# Patient Record
Sex: FEMALE | Race: BLACK OR AFRICAN AMERICAN | NOT HISPANIC OR LATINO | Employment: FULL TIME | URBAN - METROPOLITAN AREA
[De-identification: names, ages, dates, MRNs, and addresses within clinical notes are randomized per-mention and may not be internally consistent; named-entity substitution may affect disease eponyms.]

---

## 2017-01-16 ENCOUNTER — LAB (OUTPATIENT)
Dept: INTERNAL MEDICINE | Facility: CLINIC | Age: 42
End: 2017-01-16

## 2017-01-16 DIAGNOSIS — E55.9 VITAMIN D DEFICIENCY: ICD-10-CM

## 2017-01-16 DIAGNOSIS — I10 BENIGN ESSENTIAL HYPERTENSION: ICD-10-CM

## 2017-01-16 DIAGNOSIS — J30.9 ALLERGIC RHINITIS, UNSPECIFIED ALLERGIC RHINITIS TRIGGER, UNSPECIFIED RHINITIS SEASONALITY: ICD-10-CM

## 2017-01-16 DIAGNOSIS — K21.9 GASTROESOPHAGEAL REFLUX DISEASE, ESOPHAGITIS PRESENCE NOT SPECIFIED: ICD-10-CM

## 2017-01-16 DIAGNOSIS — J30.9 ALLERGIC RHINITIS: ICD-10-CM

## 2017-01-16 LAB
25(OH)D3 SERPL-MCNC: 35.6 NG/ML
ALBUMIN SERPL-MCNC: 4 G/DL (ref 3.2–4.8)
ALBUMIN/GLOB SERPL: 1.1 G/DL (ref 1.5–2.5)
ALP SERPL-CCNC: 56 U/L (ref 25–100)
ALT SERPL W P-5'-P-CCNC: 5 U/L (ref 7–40)
ANION GAP SERPL CALCULATED.3IONS-SCNC: 12 MMOL/L (ref 3–11)
ARTICHOKE IGE QN: 112 MG/DL (ref 0–130)
AST SERPL-CCNC: 13 U/L (ref 0–33)
BASOPHILS # BLD AUTO: 0.04 10*3/MM3 (ref 0–0.2)
BASOPHILS NFR BLD AUTO: 0.4 % (ref 0–1)
BILIRUB SERPL-MCNC: 0.4 MG/DL (ref 0.3–1.2)
BUN BLD-MCNC: 13 MG/DL (ref 9–23)
BUN/CREAT SERPL: 16.3 (ref 7–25)
CALCIUM SPEC-SCNC: 10.1 MG/DL (ref 8.7–10.4)
CHLORIDE SERPL-SCNC: 104 MMOL/L (ref 99–109)
CHOLEST SERPL-MCNC: 258 MG/DL (ref 0–200)
CO2 SERPL-SCNC: 29 MMOL/L (ref 20–31)
CREAT BLD-MCNC: 0.8 MG/DL (ref 0.6–1.3)
DEPRECATED RDW RBC AUTO: 45 FL (ref 37–54)
EOSINOPHIL # BLD AUTO: 0.16 10*3/MM3 (ref 0.1–0.3)
EOSINOPHIL NFR BLD AUTO: 1.7 % (ref 0–3)
ERYTHROCYTE [DISTWIDTH] IN BLOOD BY AUTOMATED COUNT: 14.4 % (ref 11.3–14.5)
GFR SERPL CREATININE-BSD FRML MDRD: 97 ML/MIN/1.73
GLOBULIN UR ELPH-MCNC: 3.5 GM/DL
GLUCOSE BLD-MCNC: 94 MG/DL (ref 70–100)
HCT VFR BLD AUTO: 36.5 % (ref 34.5–44)
HDLC SERPL-MCNC: 90 MG/DL (ref 40–60)
HGB BLD-MCNC: 11.4 G/DL (ref 11.5–15.5)
IMM GRANULOCYTES # BLD: 0.03 10*3/MM3 (ref 0–0.03)
IMM GRANULOCYTES NFR BLD: 0.3 % (ref 0–0.6)
LYMPHOCYTES # BLD AUTO: 3.47 10*3/MM3 (ref 0.6–4.8)
LYMPHOCYTES NFR BLD AUTO: 36.1 % (ref 24–44)
MCH RBC QN AUTO: 27.2 PG (ref 27–31)
MCHC RBC AUTO-ENTMCNC: 31.2 G/DL (ref 32–36)
MCV RBC AUTO: 87.1 FL (ref 80–99)
MONOCYTES # BLD AUTO: 0.5 10*3/MM3 (ref 0–1)
MONOCYTES NFR BLD AUTO: 5.2 % (ref 0–12)
NEUTROPHILS # BLD AUTO: 5.41 10*3/MM3 (ref 1.5–8.3)
NEUTROPHILS NFR BLD AUTO: 56.3 % (ref 41–71)
PLATELET # BLD AUTO: 385 10*3/MM3 (ref 150–450)
PMV BLD AUTO: 10.1 FL (ref 6–12)
POTASSIUM BLD-SCNC: 4.1 MMOL/L (ref 3.5–5.5)
PROT SERPL-MCNC: 7.5 G/DL (ref 5.7–8.2)
RBC # BLD AUTO: 4.19 10*6/MM3 (ref 3.89–5.14)
SODIUM BLD-SCNC: 145 MMOL/L (ref 132–146)
TRIGL SERPL-MCNC: 219 MG/DL (ref 0–150)
TSH SERPL DL<=0.05 MIU/L-ACNC: 2.57 MIU/ML (ref 0.35–5.35)
VIT B12 BLD-MCNC: 300 PG/ML (ref 211–911)
WBC NRBC COR # BLD: 9.61 10*3/MM3 (ref 3.5–10.8)

## 2017-01-16 PROCEDURE — 85025 COMPLETE CBC W/AUTO DIFF WBC: CPT | Performed by: NURSE PRACTITIONER

## 2017-01-16 PROCEDURE — 82306 VITAMIN D 25 HYDROXY: CPT | Performed by: NURSE PRACTITIONER

## 2017-01-16 PROCEDURE — 82607 VITAMIN B-12: CPT | Performed by: NURSE PRACTITIONER

## 2017-01-16 PROCEDURE — 84443 ASSAY THYROID STIM HORMONE: CPT | Performed by: NURSE PRACTITIONER

## 2017-01-16 PROCEDURE — 80061 LIPID PANEL: CPT | Performed by: NURSE PRACTITIONER

## 2017-01-16 PROCEDURE — 80053 COMPREHEN METABOLIC PANEL: CPT | Performed by: NURSE PRACTITIONER

## 2017-01-16 RX ORDER — MONTELUKAST SODIUM 10 MG/1
TABLET ORAL
Qty: 30 TABLET | Refills: 5 | Status: SHIPPED | OUTPATIENT
Start: 2017-01-16 | End: 2017-06-12 | Stop reason: SDUPTHER

## 2017-01-16 RX ORDER — OMEPRAZOLE 40 MG/1
CAPSULE, DELAYED RELEASE ORAL
Qty: 60 CAPSULE | Refills: 3 | Status: SHIPPED | OUTPATIENT
Start: 2017-01-16 | End: 2017-05-18 | Stop reason: SDUPTHER

## 2017-01-25 ENCOUNTER — TELEPHONE (OUTPATIENT)
Dept: INTERNAL MEDICINE | Facility: CLINIC | Age: 42
End: 2017-01-25

## 2017-01-25 NOTE — TELEPHONE ENCOUNTER
----- Message from DAVIN Jo sent at 1/24/2017 10:35 AM EST -----  Please let pt know that her TG's are too high.  This is directly related to sugar/carb and fats.  I really want her to watch her diet.  Her total cholesterol is over 200, but this is fine because her LDL is now and her HDL is high.  Everything else looks good.

## 2017-01-27 NOTE — TELEPHONE ENCOUNTER
Patient has not returned my call, so I have sent her a My Chart msg directly letting her know results and to call me if she has any questions.

## 2017-02-16 RX ORDER — HYDROCHLOROTHIAZIDE 12.5 MG/1
TABLET ORAL
Qty: 30 TABLET | Refills: 3 | Status: SHIPPED | OUTPATIENT
Start: 2017-02-16 | End: 2017-06-12 | Stop reason: SDUPTHER

## 2017-03-02 ENCOUNTER — OFFICE VISIT (OUTPATIENT)
Dept: INTERNAL MEDICINE | Facility: CLINIC | Age: 42
End: 2017-03-02

## 2017-03-02 VITALS
WEIGHT: 227.8 LBS | HEIGHT: 61 IN | TEMPERATURE: 97.9 F | SYSTOLIC BLOOD PRESSURE: 112 MMHG | DIASTOLIC BLOOD PRESSURE: 86 MMHG | BODY MASS INDEX: 43.01 KG/M2

## 2017-03-02 DIAGNOSIS — R10.2 PELVIC PAIN: ICD-10-CM

## 2017-03-02 DIAGNOSIS — R10.32 LEFT LOWER QUADRANT PAIN: Primary | ICD-10-CM

## 2017-03-02 DIAGNOSIS — N39.0 URINARY TRACT INFECTION WITHOUT HEMATURIA, SITE UNSPECIFIED: ICD-10-CM

## 2017-03-02 LAB
B-HCG UR QL: NEGATIVE
BILIRUB BLD-MCNC: ABNORMAL MG/DL
CLARITY, POC: ABNORMAL
COLOR UR: ABNORMAL
GLUCOSE UR STRIP-MCNC: NEGATIVE MG/DL
INTERNAL NEGATIVE CONTROL: NEGATIVE
INTERNAL POSITIVE CONTROL: POSITIVE
KETONES UR QL: ABNORMAL
LEUKOCYTE EST, POC: ABNORMAL
Lab: NORMAL
NITRITE UR-MCNC: NEGATIVE MG/ML
PH UR: 6 [PH] (ref 5–8)
PROT UR STRIP-MCNC: ABNORMAL MG/DL
RBC # UR STRIP: NEGATIVE /UL
SP GR UR: 1.03 (ref 1–1.03)
UROBILINOGEN UR QL: ABNORMAL

## 2017-03-02 PROCEDURE — 87086 URINE CULTURE/COLONY COUNT: CPT | Performed by: NURSE PRACTITIONER

## 2017-03-02 PROCEDURE — 81003 URINALYSIS AUTO W/O SCOPE: CPT | Performed by: NURSE PRACTITIONER

## 2017-03-02 PROCEDURE — 99214 OFFICE O/P EST MOD 30 MIN: CPT | Performed by: NURSE PRACTITIONER

## 2017-03-02 PROCEDURE — 81025 URINE PREGNANCY TEST: CPT | Performed by: NURSE PRACTITIONER

## 2017-03-02 RX ORDER — CIPROFLOXACIN 500 MG/1
500 TABLET, FILM COATED ORAL 2 TIMES DAILY
Qty: 20 TABLET | Refills: 0 | Status: SHIPPED | OUTPATIENT
Start: 2017-03-02 | End: 2017-03-12

## 2017-03-02 NOTE — PROGRESS NOTES
Subjective   Mariusz Yepez is a 41 y.o. female    Chief Complaint   Patient presents with   • Abdominal Pain     Lower left sided pain going on for about 1 month. Aching pain all the time. Has to hold and put pressure on her left lower side with she does get up and walk around.      Abdominal Pain   This is a new problem. The current episode started more than 1 month ago. The onset quality is gradual. The problem occurs intermittently (was waking her up at night). The most recent episode lasted 1 month. The problem has been gradually improving. The pain is located in the LLQ. The pain is mild. The quality of the pain is aching and colicky. The abdominal pain radiates to the LLQ. Associated symptoms include belching, constipation (chronic) and flatus. Pertinent negatives include no anorexia, arthralgias, diarrhea, dysuria, fever, frequency, headaches, hematochezia, hematuria, melena, myalgias, nausea, vomiting or weight loss. The pain is aggravated by movement and certain positions (walking down stairs, standing for long periods of time, lying on her stomach at night). The pain is relieved by nothing. Treatments tried: nsaids, increased fluids. The treatment provided no relief. Her past medical history is significant for abdominal surgery (umbilical hernia repair at age 7) and GERD. There is no history of colon cancer, Crohn's disease, gallstones, irritable bowel syndrome, pancreatitis, PUD or ulcerative colitis.      Pain started after her January 2017 menses. LMP 2/18/2017,  Regular cycle, flow light, not sexually active. Has a history of PCOS and is on metformin.  Pain started before she started taking the metformin. Denies any urinary symptoms.     The following portions of the patient's history were reviewed and updated as appropriate: allergies, current medications, past family history, past medical history, past social history, past surgical history and problem list.    Current Outpatient Prescriptions:    •  albuterol (PROVENTIL HFA;VENTOLIN HFA) 108 (90 BASE) MCG/ACT inhaler, Inhale 2 puffs every 4 (four) hours as needed for wheezing., Disp: 1 inhaler, Rfl: 0  •  azelastine (ASTELIN) 0.1 % nasal spray, 2 sprays into each nostril 2 (two) times a day. Use in each nostril as directed, Disp: 1 each, Rfl: 12  •  fexofenadine (ALLEGRA ALLERGY) 180 MG tablet, Take 1 tablet by mouth daily., Disp: 30 tablet, Rfl: 5  •  hydrochlorothiazide (HYDRODIURIL) 12.5 MG tablet, take 1 tablet by mouth once daily, Disp: 30 tablet, Rfl: 3  •  metFORMIN (GLUCOPHAGE) 500 MG tablet, Take 1 tablet by mouth 2 (Two) Times a Day With Meals., Disp: 60 tablet, Rfl: 2  •  montelukast (SINGULAIR) 10 MG tablet, take 1 tablet by mouth every evening, Disp: 30 tablet, Rfl: 5  •  nebivolol (BYSTOLIC) 10 MG tablet, Take 10 mg by mouth Daily., Disp: , Rfl:   •  omeprazole (priLOSEC) 40 MG capsule, take 1 capsule by mouth twice a day, Disp: 60 capsule, Rfl: 3  •  SAFYRAL 3-0.03-0.451 MG tablet, , Disp: , Rfl: 0     Review of Systems   Constitutional: Negative for chills, fatigue, fever and weight loss.   Respiratory: Negative for cough, chest tightness and shortness of breath.    Cardiovascular: Negative for chest pain.   Gastrointestinal: Positive for abdominal pain, constipation (chronic) and flatus. Negative for abdominal distention, anorexia, diarrhea, hematochezia, melena, nausea and vomiting.   Endocrine: Negative for cold intolerance and heat intolerance.   Genitourinary: Negative for decreased urine volume, difficulty urinating, dyspareunia (not sexually active), dysuria, flank pain, frequency, hematuria, menstrual problem, pelvic pain, urgency, vaginal bleeding, vaginal discharge and vaginal pain.   Musculoskeletal: Negative for arthralgias and myalgias.   Neurological: Negative for dizziness and headaches.     Objective   Physical Exam   Constitutional: She is oriented to person, place, and time. She appears well-developed and well-nourished.  "  HENT:   Head: Normocephalic and atraumatic.   Eyes: Conjunctivae and EOM are normal. Pupils are equal, round, and reactive to light.   Neck: Normal range of motion.   Cardiovascular: Normal rate, regular rhythm and normal heart sounds.    Pulmonary/Chest: Effort normal and breath sounds normal.   Abdominal: Soft. Bowel sounds are normal. She exhibits distension. There is no hepatosplenomegaly. There is tenderness in the left lower quadrant. There is guarding (llq). There is no rigidity, no rebound and no CVA tenderness. No hernia.   Musculoskeletal: Normal range of motion.   Neurological: She is alert and oriented to person, place, and time. She has normal reflexes.   Skin: Skin is warm and dry.   Psychiatric: She has a normal mood and affect. Her behavior is normal. Judgment and thought content normal.     Vitals:    03/02/17 1521   BP: 112/86   Temp: 97.9 °F (36.6 °C)   TempSrc: Temporal Artery    Weight: 227 lb 12.8 oz (103 kg)   Height: 61\" (154.9 cm)     Assessment/Plan   Mariusz was seen today for abdominal pain.    Diagnoses and all orders for this visit:    Left lower quadrant pain  -     POC Urinalysis Dipstick, Automated  -     POCT pregnancy, urine    Urinary tract infection without hematuria, site unspecified  -     ciprofloxacin (CIPRO) 500 MG tablet; Take 1 tablet by mouth 2 (Two) Times a Day for 10 days.  -     Urine Culture    Pelvic pain  -     US non-ob transvaginal    Urine + today  Will send urine for culture  Cipro as directed   Will schedule for TV US due to PCOS history.   If US normal and pain persists after UTI resolved,  may need to proceed with CT abd/pelvis  RTC PRN or with worsening of symptoms     Scribed for DAVIN Pardo by DAVIN Warren Student. 3/2/2017  3:35 PM    IKym APRN, personally performed the services described in this documentation as scribed by the above named individual in my presence, and it is both accurate and complete.  " 3/6/2017  8:50 AM    DAVIN Jackson

## 2017-03-03 RX ORDER — NEBIVOLOL HYDROCHLORIDE 10 MG/1
10 TABLET ORAL DAILY
Qty: 30 TABLET | Refills: 3 | Status: SHIPPED | OUTPATIENT
Start: 2017-03-03 | End: 2017-10-23 | Stop reason: SDUPTHER

## 2017-03-04 LAB — BACTERIA SPEC AEROBE CULT: NORMAL

## 2017-03-07 ENCOUNTER — HOSPITAL ENCOUNTER (OUTPATIENT)
Dept: ULTRASOUND IMAGING | Facility: HOSPITAL | Age: 42
Discharge: HOME | End: 2017-03-07
Admitting: NURSE PRACTITIONER

## 2017-03-07 DIAGNOSIS — R10.2 PELVIC PAIN: ICD-10-CM

## 2017-03-07 PROCEDURE — 76857 US EXAM PELVIC LIMITED: CPT

## 2017-03-07 PROCEDURE — 76830 TRANSVAGINAL US NON-OB: CPT

## 2017-03-08 ENCOUNTER — TELEPHONE (OUTPATIENT)
Dept: INTERNAL MEDICINE | Facility: CLINIC | Age: 42
End: 2017-03-08

## 2017-03-08 NOTE — TELEPHONE ENCOUNTER
She is not having any symptoms and wanted to know if it was okay to stop the antibiotic.  I let her know that would be okay.

## 2017-03-10 ENCOUNTER — TELEPHONE (OUTPATIENT)
Dept: INTERNAL MEDICINE | Facility: CLINIC | Age: 42
End: 2017-03-10

## 2017-03-10 NOTE — TELEPHONE ENCOUNTER
PN of the ultrasound.  She seed Dr. Roula Barraza and will make an appointment.  She will call when that appointment is made so we can fax over her results.

## 2017-03-10 NOTE — TELEPHONE ENCOUNTER
There is a large uterine fibroid (4cm), but it is otherwise normal.  I would like for her to see GYN for evaluation.  Does she have a preference as to who I refer her to?

## 2017-03-10 NOTE — TELEPHONE ENCOUNTER
----- Message from Tamra Martin sent at 3/9/2017  9:17 AM EST -----  Pt called does she need to discontinue antibiotic. Please give her a call

## 2017-03-23 DIAGNOSIS — R10.30 LOWER ABDOMINAL PAIN: Primary | ICD-10-CM

## 2017-04-18 ENCOUNTER — OFFICE VISIT (OUTPATIENT)
Dept: INTERNAL MEDICINE | Facility: CLINIC | Age: 42
End: 2017-04-18

## 2017-04-18 VITALS
WEIGHT: 217 LBS | DIASTOLIC BLOOD PRESSURE: 70 MMHG | BODY MASS INDEX: 40.97 KG/M2 | HEIGHT: 61 IN | TEMPERATURE: 97.8 F | SYSTOLIC BLOOD PRESSURE: 118 MMHG

## 2017-04-18 DIAGNOSIS — K59.00 CONSTIPATION, UNSPECIFIED CONSTIPATION TYPE: ICD-10-CM

## 2017-04-18 DIAGNOSIS — R10.32 LEFT LOWER QUADRANT PAIN: Primary | ICD-10-CM

## 2017-04-18 PROCEDURE — 99214 OFFICE O/P EST MOD 30 MIN: CPT | Performed by: NURSE PRACTITIONER

## 2017-04-18 NOTE — PROGRESS NOTES
Subjective   Mariusz Yepez is a 41 y.o. female    Chief Complaint   Patient presents with   • Follow-up   • Abdominal Pain     Had Colon yesterday. 2 polyps removed. Also saw GYN. They could not tell much from the images and need her to come back for another TV US, which is scheduled for 6/8/17.  Still has the pain, tenderness and pressure in the lower abdomen.      History of Present Illness     Here for f/u on Abdominal pain    Had a colonoscopy yesterday.  They did find and remove 2 polyps.  She does not have the results yet.      Has also seen GYN (dr. Barraza).  I sent her for a TV US after a last visit and a large fibroid was found.  Dr. Barraza could not tell anything from her US, so she will repeat her with annual pap on 6/8/17.  She really thought that her sx's were GI related and told her to proceed with a colon.  She also had her hold her metformin to see if this was the cause, but she has and her abd pain has not changes.      She is still having abdominal pain.  The onset quality is gradual. The problem occurs intermittently (was waking her up at night). The most recent episode lasted 1 month. The problem has been gradually improving. The pain is located in the LLQ. The pain is mild. The quality of the pain is aching and colicky. The abdominal pain radiates to the LLQ. Associated symptoms include belching, constipation (chronic) and flatus. Pertinent negatives include no anorexia, arthralgias, diarrhea, dysuria, fever, frequency, headaches, hematochezia, hematuria, melena, myalgias, nausea, vomiting or weight loss. The pain is aggravated by movement and certain positions (walking down stairs, standing for long periods of time, lying on her stomach at night). The pain is relieved by nothing. Treatments tried: nsaids, increased fluids. The treatment provided no relief. Her past medical history is significant for abdominal surgery (umbilical hernia repair at age 7) and GERD. There is no history of colon  cancer, Crohn's disease, gallstones, irritable bowel syndrome, pancreatitis, PUD or ulcerative colitis.     The following portions of the patient's history were reviewed and updated as appropriate: allergies, current medications, past family history, past medical history, past social history, past surgical history and problem list.    Current Outpatient Prescriptions:   •  albuterol (PROVENTIL HFA;VENTOLIN HFA) 108 (90 BASE) MCG/ACT inhaler, Inhale 2 puffs every 4 (four) hours as needed for wheezing., Disp: 1 inhaler, Rfl: 0  •  azelastine (ASTELIN) 0.1 % nasal spray, 2 sprays into each nostril 2 (two) times a day. Use in each nostril as directed, Disp: 1 each, Rfl: 12  •  BYSTOLIC 10 MG tablet, Take 1 tablet by mouth Daily., Disp: 30 tablet, Rfl: 3  •  fexofenadine (ALLEGRA ALLERGY) 180 MG tablet, Take 1 tablet by mouth daily., Disp: 30 tablet, Rfl: 5  •  hydrochlorothiazide (HYDRODIURIL) 12.5 MG tablet, take 1 tablet by mouth once daily, Disp: 30 tablet, Rfl: 3  •  Linaclotide (LINZESS) 145 MCG capsule, Take 145 mcg by mouth Daily. 30 minutes before 1st meal, Disp: 30 capsule, Rfl: 2  •  metFORMIN (GLUCOPHAGE) 500 MG tablet, Take 1 tablet by mouth 2 (Two) Times a Day With Meals., Disp: 60 tablet, Rfl: 2  •  montelukast (SINGULAIR) 10 MG tablet, take 1 tablet by mouth every evening, Disp: 30 tablet, Rfl: 5  •  nebivolol (BYSTOLIC) 10 MG tablet, Take 10 mg by mouth Daily., Disp: , Rfl:   •  omeprazole (priLOSEC) 40 MG capsule, take 1 capsule by mouth twice a day, Disp: 60 capsule, Rfl: 3  •  SAFYRAL 3-0.03-0.451 MG tablet, , Disp: , Rfl: 0     Review of Systems   Constitutional: Negative for chills, fatigue and fever.   Respiratory: Negative for cough, chest tightness and shortness of breath.    Cardiovascular: Negative for chest pain.   Gastrointestinal: Positive for abdominal pain and constipation. Negative for diarrhea, nausea and vomiting.   Endocrine: Negative for cold intolerance and heat intolerance.  "  Genitourinary: Negative for dysuria, frequency and hematuria.   Musculoskeletal: Negative for arthralgias and myalgias.   Neurological: Negative for dizziness and headaches.       Objective   Physical Exam   Constitutional: She is oriented to person, place, and time. She appears well-developed and well-nourished.   HENT:   Head: Normocephalic and atraumatic.   Eyes: Conjunctivae and EOM are normal. Pupils are equal, round, and reactive to light.   Neck: Normal range of motion.   Cardiovascular: Normal rate, regular rhythm and normal heart sounds.    Pulmonary/Chest: Effort normal and breath sounds normal.   Abdominal: Soft. Bowel sounds are normal. There is no hepatosplenomegaly. There is tenderness in the left lower quadrant. There is no rigidity, no rebound, no guarding, no CVA tenderness, no tenderness at McBurney's point and negative Dong's sign. No hernia.   Musculoskeletal: Normal range of motion.   Neurological: She is alert and oriented to person, place, and time. She has normal reflexes.   Skin: Skin is warm and dry.   Psychiatric: She has a normal mood and affect. Her behavior is normal. Judgment and thought content normal.     Vitals:    04/18/17 1520   BP: 118/70   Temp: 97.8 °F (36.6 °C)   TempSrc: Temporal Artery    Weight: 217 lb (98.4 kg)   Height: 61\" (154.9 cm)         Assessment/Plan   Mariusz was seen today for follow-up and abdominal pain.    Diagnoses and all orders for this visit:    Left lower quadrant pain  -     Cancel: CT Abdomen Pelvis With & Without Contrast  -     CT Abdomen Pelvis With & Without Contrast    Constipation, unspecified constipation type  -     Cancel: CT Abdomen Pelvis With & Without Contrast  -     Linaclotide (LINZESS) 145 MCG capsule; Take 145 mcg by mouth Daily. 30 minutes before 1st meal  -     CT Abdomen Pelvis With & Without Contrast    Will set up for CT scan at this point to evaluate LLQ abdominal pain  Will try Linzess for constipatoin  F/U as scheduled in " [place] or sooner with any problems

## 2017-04-27 ENCOUNTER — HOSPITAL ENCOUNTER (OUTPATIENT)
Dept: CT IMAGING | Facility: HOSPITAL | Age: 42
Discharge: HOME | End: 2017-04-27
Admitting: NURSE PRACTITIONER

## 2017-04-27 PROCEDURE — 0 IOPAMIDOL 61 % SOLUTION: Performed by: NURSE PRACTITIONER

## 2017-04-27 PROCEDURE — 74178 CT ABD&PLV WO CNTR FLWD CNTR: CPT

## 2017-04-27 RX ADMIN — BARIUM SULFATE 450 ML: 21 SUSPENSION ORAL at 10:00

## 2017-04-27 RX ADMIN — IOPAMIDOL 100 ML: 612 INJECTION, SOLUTION INTRAVENOUS at 11:15

## 2017-04-28 ENCOUNTER — TELEPHONE (OUTPATIENT)
Dept: INTERNAL MEDICINE | Facility: CLINIC | Age: 42
End: 2017-04-28

## 2017-05-18 RX ORDER — OMEPRAZOLE 40 MG/1
CAPSULE, DELAYED RELEASE ORAL
Qty: 60 CAPSULE | Refills: 3 | Status: SHIPPED | OUTPATIENT
Start: 2017-05-18 | End: 2017-11-13 | Stop reason: SDUPTHER

## 2017-06-12 ENCOUNTER — OFFICE VISIT (OUTPATIENT)
Dept: INTERNAL MEDICINE | Facility: CLINIC | Age: 42
End: 2017-06-12

## 2017-06-12 VITALS
TEMPERATURE: 97.1 F | BODY MASS INDEX: 40.89 KG/M2 | DIASTOLIC BLOOD PRESSURE: 80 MMHG | SYSTOLIC BLOOD PRESSURE: 110 MMHG | HEIGHT: 61 IN | WEIGHT: 216.6 LBS

## 2017-06-12 DIAGNOSIS — K59.00 CONSTIPATION, UNSPECIFIED CONSTIPATION TYPE: ICD-10-CM

## 2017-06-12 DIAGNOSIS — J30.9 ALLERGIC RHINITIS, UNSPECIFIED ALLERGIC RHINITIS TRIGGER, UNSPECIFIED RHINITIS SEASONALITY: ICD-10-CM

## 2017-06-12 DIAGNOSIS — K21.9 GASTROESOPHAGEAL REFLUX DISEASE, ESOPHAGITIS PRESENCE NOT SPECIFIED: ICD-10-CM

## 2017-06-12 DIAGNOSIS — Z86.39 HISTORY OF VITAMIN D DEFICIENCY: ICD-10-CM

## 2017-06-12 DIAGNOSIS — I10 BENIGN ESSENTIAL HYPERTENSION: Primary | ICD-10-CM

## 2017-06-12 DIAGNOSIS — Z23 NEED FOR TD VACCINE: ICD-10-CM

## 2017-06-12 LAB
25(OH)D3 SERPL-MCNC: 29.3 NG/ML
ALBUMIN SERPL-MCNC: 4.2 G/DL (ref 3.2–4.8)
ALBUMIN/GLOB SERPL: 1.3 G/DL (ref 1.5–2.5)
ALP SERPL-CCNC: 58 U/L (ref 25–100)
ALT SERPL W P-5'-P-CCNC: 15 U/L (ref 7–40)
ANION GAP SERPL CALCULATED.3IONS-SCNC: 10 MMOL/L (ref 3–11)
ARTICHOKE IGE QN: 136 MG/DL (ref 0–130)
AST SERPL-CCNC: 18 U/L (ref 0–33)
BASOPHILS # BLD AUTO: 0.04 10*3/MM3 (ref 0–0.2)
BASOPHILS NFR BLD AUTO: 0.4 % (ref 0–1)
BILIRUB SERPL-MCNC: 0.4 MG/DL (ref 0.3–1.2)
BUN BLD-MCNC: 13 MG/DL (ref 9–23)
BUN/CREAT SERPL: 16.3 (ref 7–25)
CALCIUM SPEC-SCNC: 10 MG/DL (ref 8.7–10.4)
CHLORIDE SERPL-SCNC: 104 MMOL/L (ref 99–109)
CHOLEST SERPL-MCNC: 265 MG/DL (ref 0–200)
CO2 SERPL-SCNC: 24 MMOL/L (ref 20–31)
CREAT BLD-MCNC: 0.8 MG/DL (ref 0.6–1.3)
DEPRECATED RDW RBC AUTO: 47.6 FL (ref 37–54)
EOSINOPHIL # BLD AUTO: 0.13 10*3/MM3 (ref 0.1–0.3)
EOSINOPHIL NFR BLD AUTO: 1.3 % (ref 0–3)
ERYTHROCYTE [DISTWIDTH] IN BLOOD BY AUTOMATED COUNT: 14.8 % (ref 11.3–14.5)
GFR SERPL CREATININE-BSD FRML MDRD: 97 ML/MIN/1.73
GLOBULIN UR ELPH-MCNC: 3.2 GM/DL
GLUCOSE BLD-MCNC: 119 MG/DL (ref 70–100)
HCT VFR BLD AUTO: 36.9 % (ref 34.5–44)
HDLC SERPL-MCNC: 88 MG/DL (ref 40–60)
HGB BLD-MCNC: 11.1 G/DL (ref 11.5–15.5)
IMM GRANULOCYTES # BLD: 0.03 10*3/MM3 (ref 0–0.03)
IMM GRANULOCYTES NFR BLD: 0.3 % (ref 0–0.6)
LYMPHOCYTES # BLD AUTO: 3.59 10*3/MM3 (ref 0.6–4.8)
LYMPHOCYTES NFR BLD AUTO: 35.4 % (ref 24–44)
MCH RBC QN AUTO: 26.6 PG (ref 27–31)
MCHC RBC AUTO-ENTMCNC: 30.1 G/DL (ref 32–36)
MCV RBC AUTO: 88.5 FL (ref 80–99)
MONOCYTES # BLD AUTO: 0.54 10*3/MM3 (ref 0–1)
MONOCYTES NFR BLD AUTO: 5.3 % (ref 0–12)
NEUTROPHILS # BLD AUTO: 5.81 10*3/MM3 (ref 1.5–8.3)
NEUTROPHILS NFR BLD AUTO: 57.3 % (ref 41–71)
PLATELET # BLD AUTO: 408 10*3/MM3 (ref 150–450)
PMV BLD AUTO: 9.9 FL (ref 6–12)
POTASSIUM BLD-SCNC: 4.3 MMOL/L (ref 3.5–5.5)
PROT SERPL-MCNC: 7.4 G/DL (ref 5.7–8.2)
RBC # BLD AUTO: 4.17 10*6/MM3 (ref 3.89–5.14)
SODIUM BLD-SCNC: 138 MMOL/L (ref 132–146)
TRIGL SERPL-MCNC: 162 MG/DL (ref 0–150)
TSH SERPL DL<=0.05 MIU/L-ACNC: 3.1 MIU/ML (ref 0.35–5.35)
VIT B12 BLD-MCNC: 276 PG/ML (ref 211–911)
WBC NRBC COR # BLD: 10.14 10*3/MM3 (ref 3.5–10.8)

## 2017-06-12 PROCEDURE — 90714 TD VACC NO PRESV 7 YRS+ IM: CPT | Performed by: NURSE PRACTITIONER

## 2017-06-12 PROCEDURE — 85025 COMPLETE CBC W/AUTO DIFF WBC: CPT | Performed by: NURSE PRACTITIONER

## 2017-06-12 PROCEDURE — 80061 LIPID PANEL: CPT | Performed by: NURSE PRACTITIONER

## 2017-06-12 PROCEDURE — 82306 VITAMIN D 25 HYDROXY: CPT | Performed by: NURSE PRACTITIONER

## 2017-06-12 PROCEDURE — 99214 OFFICE O/P EST MOD 30 MIN: CPT | Performed by: NURSE PRACTITIONER

## 2017-06-12 PROCEDURE — 90471 IMMUNIZATION ADMIN: CPT | Performed by: NURSE PRACTITIONER

## 2017-06-12 PROCEDURE — 82607 VITAMIN B-12: CPT | Performed by: NURSE PRACTITIONER

## 2017-06-12 PROCEDURE — 80053 COMPREHEN METABOLIC PANEL: CPT | Performed by: NURSE PRACTITIONER

## 2017-06-12 PROCEDURE — 84443 ASSAY THYROID STIM HORMONE: CPT | Performed by: NURSE PRACTITIONER

## 2017-06-12 RX ORDER — MELATONIN 200 MCG
TABLET ORAL NIGHTLY
COMMUNITY
End: 2022-05-05

## 2017-06-12 RX ORDER — LUBIPROSTONE 24 UG/1
24 CAPSULE ORAL 2 TIMES DAILY WITH MEALS
Qty: 60 CAPSULE | Refills: 2 | Status: SHIPPED | OUTPATIENT
Start: 2017-06-12 | End: 2018-03-21

## 2017-06-12 RX ORDER — MONTELUKAST SODIUM 10 MG/1
10 TABLET ORAL NIGHTLY
Qty: 30 TABLET | Refills: 5 | Status: SHIPPED | OUTPATIENT
Start: 2017-06-12 | End: 2017-11-30 | Stop reason: SDUPTHER

## 2017-06-12 RX ORDER — CHOLECALCIFEROL (VITAMIN D3) 125 MCG
CAPSULE ORAL
COMMUNITY

## 2017-06-12 RX ORDER — AMOXICILLIN 500 MG
1200 CAPSULE ORAL DAILY
COMMUNITY

## 2017-06-12 RX ORDER — HYDROCHLOROTHIAZIDE 12.5 MG/1
12.5 TABLET ORAL DAILY
Qty: 30 TABLET | Refills: 5 | Status: SHIPPED | OUTPATIENT
Start: 2017-06-12 | End: 2017-11-30 | Stop reason: SDUPTHER

## 2017-06-12 NOTE — PROGRESS NOTES
Subjective   Mariusz Yepez is a 41 y.o. female    Chief Complaint   Patient presents with   • 6 month follow up   • Hypertension   • Allergies   • Heartburn     History of Present Illness     Constipation/abdominal pain - still having problems with this.  She had a colonoscopy - 2 polyps removed; benign.  CT scan of abdomen was normal.  TV US per GYN was normal.  I gave her Linzess at her last visit and she states that it did nothing.  She has scheduled an appt with Dr. Arango, but it is not until August.     Allergy sx's are better. I referred her to an allergist in August 2016. She added Qvar to her Singulair, Albuterol, flonase and astelin. Sx's are much better.       HTN - chronic and stable on Bystolic 10mg tablet and HCTZ 12.5mg       Reflux - chronic and stable on Prilosec      GYN- Dr. Hanna or [facility] (she is repeating her TV US in Sept - to repeat due to LLQ pain)  mamm - never  last colon - 4/2017 (2 polyps)   last Pap summer 2016  DXA none  last eye exam; goes every 6 months   TdaP 8/07   Flu shot - fall 2016  EGD 12/30/13-esophagitis      The following portions of the patient's history were reviewed and updated as appropriate: allergies, current medications, past family history, past medical history, past social history, past surgical history and problem list.    Current Outpatient Prescriptions:   •  BYSTOLIC 10 MG tablet, Take 1 tablet by mouth Daily., Disp: 30 tablet, Rfl: 3  •  Cholecalciferol (VITAMIN D3) 2000 UNITS tablet, Take  by mouth., Disp: , Rfl:   •  fexofenadine (ALLEGRA ALLERGY) 180 MG tablet, Take 1 tablet by mouth daily., Disp: 30 tablet, Rfl: 5  •  hydrochlorothiazide (HYDRODIURIL) 12.5 MG tablet, Take 1 tablet by mouth Daily., Disp: 30 tablet, Rfl: 5  •  Melatonin 3 MG tablet, Take  by mouth Every Night., Disp: , Rfl:   •  metFORMIN (GLUCOPHAGE) 500 MG tablet, Take 1 tablet by mouth 2 (Two) Times a Day With Meals., Disp: 60 tablet, Rfl: 2  •  montelukast (SINGULAIR) 10 MG tablet,  Take 1 tablet by mouth Every Night., Disp: 30 tablet, Rfl: 5  •  Omega-3 Fatty Acids (FISH OIL) 1200 MG capsule capsule, Take 1,200 mg by mouth Daily., Disp: , Rfl:   •  omeprazole (priLOSEC) 40 MG capsule, take 1 capsule by mouth twice a day, Disp: 60 capsule, Rfl: 3  •  SAFYRAL 3-0.03-0.451 MG tablet, , Disp: , Rfl: 0  •  albuterol (PROVENTIL HFA;VENTOLIN HFA) 108 (90 BASE) MCG/ACT inhaler, Inhale 2 puffs every 4 (four) hours as needed for wheezing., Disp: 1 inhaler, Rfl: 0  •  azelastine (ASTELIN) 0.1 % nasal spray, 2 sprays into each nostril 2 (two) times a day. Use in each nostril as directed, Disp: 1 each, Rfl: 12  •  lubiprostone (AMITIZA) 24 MCG capsule, Take 1 capsule by mouth 2 (Two) Times a Day With Meals., Disp: 60 capsule, Rfl: 2     Review of Systems   Constitutional: Negative for chills, fatigue and fever.   Respiratory: Negative for cough, chest tightness and shortness of breath.    Cardiovascular: Negative for chest pain.   Gastrointestinal: Positive for abdominal pain and constipation. Negative for diarrhea, nausea and vomiting.   Endocrine: Negative for cold intolerance and heat intolerance.   Musculoskeletal: Negative for arthralgias.   Neurological: Negative for dizziness.       Objective   Physical Exam   Constitutional: She is oriented to person, place, and time. She appears well-developed and well-nourished.   HENT:   Head: Normocephalic and atraumatic.   Eyes: Conjunctivae and EOM are normal. Pupils are equal, round, and reactive to light.   Neck: Normal range of motion.   Cardiovascular: Normal rate, regular rhythm and normal heart sounds.    Pulmonary/Chest: Effort normal and breath sounds normal.   Abdominal: Soft. Bowel sounds are normal.   Musculoskeletal: Normal range of motion.   Neurological: She is alert and oriented to person, place, and time. She has normal reflexes.   Skin: Skin is warm and dry.   Psychiatric: She has a normal mood and affect. Her behavior is normal. Judgment and  "thought content normal.     Vitals:    06/12/17 0934   BP: 110/80   Temp: 97.1 °F (36.2 °C)   TempSrc: Temporal Artery    Weight: 216 lb 9.6 oz (98.2 kg)   Height: 61\" (154.9 cm)         Assessment/Plan   Mariusz was seen today for 6 month follow up, hypertension, allergies and heartburn.    Diagnoses and all orders for this visit:    Benign essential hypertension  -     CBC & Differential  -     Comprehensive Metabolic Panel  -     Lipid Panel  -     Vitamin D 25 Hydroxy  -     Vitamin B12  -     TSH  -     CBC Auto Differential  -     hydrochlorothiazide (HYDRODIURIL) 12.5 MG tablet; Take 1 tablet by mouth Daily.    Gastroesophageal reflux disease, esophagitis presence not specified  -     CBC & Differential  -     Comprehensive Metabolic Panel  -     Lipid Panel  -     Vitamin D 25 Hydroxy  -     Vitamin B12  -     TSH  -     CBC Auto Differential    Constipation, unspecified constipation type  -     lubiprostone (AMITIZA) 24 MCG capsule; Take 1 capsule by mouth 2 (Two) Times a Day With Meals.  -     CBC & Differential  -     Comprehensive Metabolic Panel  -     Lipid Panel  -     Vitamin D 25 Hydroxy  -     Vitamin B12  -     TSH  -     CBC Auto Differential    History of vitamin D deficiency  -     CBC & Differential  -     Comprehensive Metabolic Panel  -     Lipid Panel  -     Vitamin D 25 Hydroxy  -     Vitamin B12  -     TSH  -     CBC Auto Differential    Allergic rhinitis, unspecified allergic rhinitis trigger, unspecified rhinitis seasonality  -     montelukast (SINGULAIR) 10 MG tablet; Take 1 tablet by mouth Every Night.    Need for TD vaccine  -     Td Vaccine Greater Than or Equal To 8yo Preservative Free IM    Labs sent  We will try Amitiza 24mg BID for constipation  Meds refilled  Td updated  F/U in 6 months or sooner with any problems             "

## 2017-06-30 RX ORDER — CYANOCOBALAMIN 1000 UG/ML
INJECTION, SOLUTION INTRAMUSCULAR; SUBCUTANEOUS
Qty: 25 ML | Refills: 3 | Status: SHIPPED | OUTPATIENT
Start: 2017-06-30 | End: 2017-11-09 | Stop reason: SDUPTHER

## 2017-07-31 ENCOUNTER — TRANSCRIBE ORDERS (OUTPATIENT)
Dept: ADMINISTRATIVE | Facility: HOSPITAL | Age: 42
End: 2017-07-31

## 2017-07-31 DIAGNOSIS — Z12.31 VISIT FOR SCREENING MAMMOGRAM: Primary | ICD-10-CM

## 2017-09-06 ENCOUNTER — HOSPITAL ENCOUNTER (OUTPATIENT)
Dept: MAMMOGRAPHY | Facility: HOSPITAL | Age: 42
Discharge: HOME | End: 2017-09-06
Attending: OBSTETRICS & GYNECOLOGY | Admitting: OBSTETRICS & GYNECOLOGY

## 2017-09-06 DIAGNOSIS — Z12.31 VISIT FOR SCREENING MAMMOGRAM: ICD-10-CM

## 2017-09-06 PROCEDURE — G0202 SCR MAMMO BI INCL CAD: HCPCS

## 2017-09-06 PROCEDURE — 77063 BREAST TOMOSYNTHESIS BI: CPT | Performed by: RADIOLOGY

## 2017-09-06 PROCEDURE — 77063 BREAST TOMOSYNTHESIS BI: CPT

## 2017-09-06 PROCEDURE — 77067 SCR MAMMO BI INCL CAD: CPT | Performed by: RADIOLOGY

## 2017-10-12 ENCOUNTER — TELEPHONE (OUTPATIENT)
Dept: INTERNAL MEDICINE | Facility: CLINIC | Age: 42
End: 2017-10-12

## 2017-10-12 DIAGNOSIS — E53.8 B12 DEFICIENCY: Primary | ICD-10-CM

## 2017-10-12 NOTE — TELEPHONE ENCOUNTER
Ms. Yepez says she sent Kym an email last week about lab work, she says she was waiting on a response

## 2017-10-12 NOTE — TELEPHONE ENCOUNTER
Patient said that you told her to come back to the office for her to have her B12 rechecked and she wants to come by and get this done. Can you put orders in for this?

## 2017-10-19 ENCOUNTER — CLINICAL SUPPORT (OUTPATIENT)
Dept: INTERNAL MEDICINE | Facility: CLINIC | Age: 42
End: 2017-10-19

## 2017-10-19 DIAGNOSIS — E53.8 B12 DEFICIENCY: ICD-10-CM

## 2017-10-19 LAB
BASOPHILS # BLD AUTO: 0.04 10*3/MM3 (ref 0–0.2)
BASOPHILS NFR BLD AUTO: 0.6 % (ref 0–1)
DEPRECATED RDW RBC AUTO: 46 FL (ref 37–54)
EOSINOPHIL # BLD AUTO: 0.24 10*3/MM3 (ref 0–0.3)
EOSINOPHIL NFR BLD AUTO: 3.8 % (ref 0–3)
ERYTHROCYTE [DISTWIDTH] IN BLOOD BY AUTOMATED COUNT: 15.5 % (ref 11.3–14.5)
HCT VFR BLD AUTO: 34.6 % (ref 34.5–44)
HGB BLD-MCNC: 10.3 G/DL (ref 11.5–15.5)
IMM GRANULOCYTES # BLD: 0.02 10*3/MM3 (ref 0–0.03)
IMM GRANULOCYTES NFR BLD: 0.3 % (ref 0–0.6)
LYMPHOCYTES # BLD AUTO: 2.85 10*3/MM3 (ref 0.6–4.8)
LYMPHOCYTES NFR BLD AUTO: 44.8 % (ref 24–44)
MCH RBC QN AUTO: 24.3 PG (ref 27–31)
MCHC RBC AUTO-ENTMCNC: 29.8 G/DL (ref 32–36)
MCV RBC AUTO: 81.8 FL (ref 80–99)
MONOCYTES # BLD AUTO: 0.53 10*3/MM3 (ref 0–1)
MONOCYTES NFR BLD AUTO: 8.3 % (ref 0–12)
NEUTROPHILS # BLD AUTO: 2.68 10*3/MM3 (ref 1.5–8.3)
NEUTROPHILS NFR BLD AUTO: 42.2 % (ref 41–71)
PLATELET # BLD AUTO: 352 10*3/MM3 (ref 150–450)
PMV BLD AUTO: 9.4 FL (ref 6–12)
RBC # BLD AUTO: 4.23 10*6/MM3 (ref 3.89–5.14)
VIT B12 BLD-MCNC: 327 PG/ML (ref 211–911)
WBC NRBC COR # BLD: 6.36 10*3/MM3 (ref 3.5–10.8)

## 2017-10-19 PROCEDURE — 85025 COMPLETE CBC W/AUTO DIFF WBC: CPT | Performed by: NURSE PRACTITIONER

## 2017-10-19 PROCEDURE — 82607 VITAMIN B-12: CPT | Performed by: NURSE PRACTITIONER

## 2017-10-23 RX ORDER — NEBIVOLOL HYDROCHLORIDE 10 MG/1
TABLET ORAL
Qty: 30 TABLET | Refills: 5 | Status: SHIPPED | OUTPATIENT
Start: 2017-10-23 | End: 2018-04-14 | Stop reason: SDUPTHER

## 2017-10-25 ENCOUNTER — TELEPHONE (OUTPATIENT)
Dept: INTERNAL MEDICINE | Facility: CLINIC | Age: 42
End: 2017-10-25

## 2017-10-25 NOTE — TELEPHONE ENCOUNTER
----- Message from DAVIN Jo sent at 10/22/2017  9:25 PM EDT -----  I have a report for an abd US that shows gallstones.  I am not sure who ordered.  I would suggest seeing gen surgery if she is having pain.

## 2017-10-27 ENCOUNTER — TELEPHONE (OUTPATIENT)
Dept: INTERNAL MEDICINE | Facility: CLINIC | Age: 42
End: 2017-10-27

## 2017-10-27 NOTE — TELEPHONE ENCOUNTER
Ms. Yepez called stating she is having the same stomach issues, she does not know whether to come back in to see Melquiades she would like a call back

## 2017-10-27 NOTE — TELEPHONE ENCOUNTER
Spoke with patient. She saw GI. They did the Ultrasound. Showed Gallstones of course. They referred her to GEN Surgery and then she didn't go because she thought it wasn't the cause but since you think she should also see gen surgery then she will proceed with the appt to see them since she is still having pain

## 2017-11-03 ENCOUNTER — TELEPHONE (OUTPATIENT)
Dept: INTERNAL MEDICINE | Facility: CLINIC | Age: 42
End: 2017-11-03

## 2017-11-03 NOTE — TELEPHONE ENCOUNTER
----- Message from DAVIN Jo sent at 11/1/2017  7:10 PM EDT -----  CBC shows anemia and B12 def.  Is she taking B12?  I would like to ck her iron levels and if normal, I would suggest B12 injections.

## 2017-11-06 DIAGNOSIS — D64.9 ANEMIA, UNSPECIFIED TYPE: Primary | ICD-10-CM

## 2017-11-07 ENCOUNTER — LAB (OUTPATIENT)
Dept: INTERNAL MEDICINE | Facility: CLINIC | Age: 42
End: 2017-11-07

## 2017-11-07 DIAGNOSIS — D64.9 ANEMIA, UNSPECIFIED TYPE: ICD-10-CM

## 2017-11-07 LAB
FERRITIN SERPL-MCNC: 6 NG/ML (ref 10–291)
FOLATE SERPL-MCNC: 9.72 NG/ML (ref 3.2–20)
IRON 24H UR-MRATE: 19 MCG/DL (ref 50–175)
IRON SATN MFR SERPL: 4 % (ref 15–50)
TIBC SERPL-MCNC: 526 MCG/DL (ref 250–450)

## 2017-11-07 PROCEDURE — 36415 COLL VENOUS BLD VENIPUNCTURE: CPT

## 2017-11-07 PROCEDURE — 83550 IRON BINDING TEST: CPT | Performed by: NURSE PRACTITIONER

## 2017-11-07 PROCEDURE — 82746 ASSAY OF FOLIC ACID SERUM: CPT | Performed by: NURSE PRACTITIONER

## 2017-11-07 PROCEDURE — 82728 ASSAY OF FERRITIN: CPT | Performed by: NURSE PRACTITIONER

## 2017-11-07 PROCEDURE — 83540 ASSAY OF IRON: CPT | Performed by: NURSE PRACTITIONER

## 2017-11-09 RX ORDER — FERROUS SULFATE 325(65) MG
325 TABLET ORAL 2 TIMES DAILY WITH MEALS
Qty: 60 TABLET | Refills: 5 | Status: SHIPPED | OUTPATIENT
Start: 2017-11-09 | End: 2018-04-28 | Stop reason: SDUPTHER

## 2017-11-09 RX ORDER — CYANOCOBALAMIN 1000 UG/ML
INJECTION, SOLUTION INTRAMUSCULAR; SUBCUTANEOUS
Qty: 30 ML | Refills: 5 | Status: SHIPPED | OUTPATIENT
Start: 2017-11-09 | End: 2018-11-29

## 2017-11-14 RX ORDER — OMEPRAZOLE 40 MG/1
CAPSULE, DELAYED RELEASE ORAL
Qty: 60 CAPSULE | Refills: 5 | Status: SHIPPED | OUTPATIENT
Start: 2017-11-14 | End: 2018-04-28 | Stop reason: SDUPTHER

## 2017-11-28 ENCOUNTER — APPOINTMENT (OUTPATIENT)
Dept: PREADMISSION TESTING | Facility: HOSPITAL | Age: 42
End: 2017-11-28

## 2017-11-28 LAB
ANION GAP SERPL CALCULATED.3IONS-SCNC: 8 MMOL/L (ref 3–11)
BUN BLD-MCNC: 11 MG/DL (ref 9–23)
BUN/CREAT SERPL: 13.8 (ref 7–25)
CALCIUM SPEC-SCNC: 9.3 MG/DL (ref 8.7–10.4)
CHLORIDE SERPL-SCNC: 100 MMOL/L (ref 99–109)
CO2 SERPL-SCNC: 28 MMOL/L (ref 20–31)
CREAT BLD-MCNC: 0.8 MG/DL (ref 0.6–1.3)
DEPRECATED RDW RBC AUTO: 58.5 FL (ref 37–54)
ERYTHROCYTE [DISTWIDTH] IN BLOOD BY AUTOMATED COUNT: 19 % (ref 11.3–14.5)
GFR SERPL CREATININE-BSD FRML MDRD: 96 ML/MIN/1.73
GLUCOSE BLD-MCNC: 147 MG/DL (ref 70–100)
HCT VFR BLD AUTO: 36.9 % (ref 34.5–44)
HGB BLD-MCNC: 11.4 G/DL (ref 11.5–15.5)
MCH RBC QN AUTO: 26.1 PG (ref 27–31)
MCHC RBC AUTO-ENTMCNC: 30.9 G/DL (ref 32–36)
MCV RBC AUTO: 84.4 FL (ref 80–99)
PLATELET # BLD AUTO: 368 10*3/MM3 (ref 150–450)
PMV BLD AUTO: 8.9 FL (ref 6–12)
POTASSIUM BLD-SCNC: 3.7 MMOL/L (ref 3.5–5.5)
RBC # BLD AUTO: 4.37 10*6/MM3 (ref 3.89–5.14)
SODIUM BLD-SCNC: 136 MMOL/L (ref 132–146)
WBC NRBC COR # BLD: 10.26 10*3/MM3 (ref 3.5–10.8)

## 2017-11-28 PROCEDURE — 36415 COLL VENOUS BLD VENIPUNCTURE: CPT

## 2017-11-28 PROCEDURE — 93010 ELECTROCARDIOGRAM REPORT: CPT | Performed by: INTERNAL MEDICINE

## 2017-11-28 PROCEDURE — 80048 BASIC METABOLIC PNL TOTAL CA: CPT | Performed by: SURGERY

## 2017-11-28 PROCEDURE — 93005 ELECTROCARDIOGRAM TRACING: CPT

## 2017-11-28 PROCEDURE — 85027 COMPLETE CBC AUTOMATED: CPT | Performed by: SURGERY

## 2017-11-30 ENCOUNTER — LAB REQUISITION (OUTPATIENT)
Dept: LAB | Facility: HOSPITAL | Age: 42
End: 2017-11-30

## 2017-11-30 DIAGNOSIS — I10 BENIGN ESSENTIAL HYPERTENSION: ICD-10-CM

## 2017-11-30 DIAGNOSIS — J30.9 ALLERGIC RHINITIS: ICD-10-CM

## 2017-11-30 DIAGNOSIS — K80.20 CALCULUS OF GALLBLADDER WITHOUT CHOLECYSTITIS WITHOUT OBSTRUCTION: ICD-10-CM

## 2017-11-30 PROCEDURE — 88304 TISSUE EXAM BY PATHOLOGIST: CPT | Performed by: SURGERY

## 2017-11-30 RX ORDER — HYDROCHLOROTHIAZIDE 12.5 MG/1
TABLET ORAL
Qty: 30 TABLET | Refills: 5 | Status: SHIPPED | OUTPATIENT
Start: 2017-11-30 | End: 2018-05-17 | Stop reason: SDUPTHER

## 2017-11-30 RX ORDER — MONTELUKAST SODIUM 10 MG/1
TABLET ORAL
Qty: 30 TABLET | Refills: 5 | Status: SHIPPED | OUTPATIENT
Start: 2017-11-30 | End: 2018-05-17 | Stop reason: SDUPTHER

## 2017-12-01 LAB
CYTO UR: NORMAL
LAB AP CASE REPORT: NORMAL
LAB AP CLINICAL INFORMATION: NORMAL
Lab: NORMAL
PATH REPORT.FINAL DX SPEC: NORMAL
PATH REPORT.GROSS SPEC: NORMAL

## 2017-12-04 ENCOUNTER — OFFICE VISIT (OUTPATIENT)
Dept: INTERNAL MEDICINE | Facility: CLINIC | Age: 42
End: 2017-12-04

## 2017-12-04 VITALS
HEART RATE: 86 BPM | DIASTOLIC BLOOD PRESSURE: 80 MMHG | SYSTOLIC BLOOD PRESSURE: 118 MMHG | WEIGHT: 220.46 LBS | RESPIRATION RATE: 16 BRPM | OXYGEN SATURATION: 99 % | HEIGHT: 61 IN | TEMPERATURE: 98.2 F | BODY MASS INDEX: 41.62 KG/M2

## 2017-12-04 DIAGNOSIS — R94.31 ABNORMAL EKG: Primary | ICD-10-CM

## 2017-12-04 DIAGNOSIS — D50.9 IRON DEFICIENCY ANEMIA, UNSPECIFIED IRON DEFICIENCY ANEMIA TYPE: ICD-10-CM

## 2017-12-04 DIAGNOSIS — K21.9 GASTROESOPHAGEAL REFLUX DISEASE, ESOPHAGITIS PRESENCE NOT SPECIFIED: ICD-10-CM

## 2017-12-04 DIAGNOSIS — I10 BENIGN ESSENTIAL HYPERTENSION: ICD-10-CM

## 2017-12-04 DIAGNOSIS — J30.9 ALLERGIC RHINITIS, UNSPECIFIED CHRONICITY, UNSPECIFIED SEASONALITY, UNSPECIFIED TRIGGER: ICD-10-CM

## 2017-12-04 DIAGNOSIS — Z86.39 HISTORY OF VITAMIN D DEFICIENCY: ICD-10-CM

## 2017-12-04 DIAGNOSIS — E28.2 POLYCYSTIC OVARIES: ICD-10-CM

## 2017-12-04 PROCEDURE — 99214 OFFICE O/P EST MOD 30 MIN: CPT | Performed by: NURSE PRACTITIONER

## 2017-12-04 RX ORDER — FLUTICASONE PROPIONATE 50 MCG
SPRAY, SUSPENSION (ML) NASAL
Refills: 1 | COMMUNITY
Start: 2017-10-11 | End: 2017-12-26 | Stop reason: SDUPTHER

## 2017-12-04 RX ORDER — NORETHINDRONE ACETATE AND ETHINYL ESTRADIOL, ETHINYL ESTRADIOL AND FERROUS FUMARATE 1MG-10(24)
KIT ORAL
Refills: 1 | COMMUNITY
Start: 2017-11-14 | End: 2020-09-29 | Stop reason: SDUPTHER

## 2017-12-04 NOTE — PROGRESS NOTES
Subjective   Mariusz Yepez is a 42 y.o. female    Chief Complaint   Patient presents with   • Discuss a recent abnormal EKG     Had gallbladder out Thursday 11/30/2017   • potassium level     History of Present Illness     Pt had GB removed on 11/30/17 per Dr. Lafleur.  During her pre-op, she was told her EKG was abnormal.  In review, it does show LVH and abn ST depression in the anterior leads.  She denies any sx's.  States that she climbs 3 flights of stairs multiple times per day at work w/o problems.      Constipation/abdominal pain - still having problems with this.  She had a colonoscopy - 2 polyps removed; benign.  CT scan of abdomen was normal.  TV US per GYN was normal.  I gave her Linzess at her last visit and she states that it did nothing.  We then tried Amitiza, but again it did nothing.  GI ordered an US of the gallbladder which was + for gallstones.  She had her GB removed 4 days ago.  States that she is feeling better, just still sore.    Was found to be anemic; now taking ferrous sulfate and doing B12 injections every other week.     Allergy sx's are better. I referred her to an allergist in August 2016. She added Qvar to her Singulair, Albuterol, flonase and astelin. She had not had to use her inhaler until just recently after her surgery due to a cough      HTN - chronic and stable on Bystolic 10mg tablet and HCTZ 12.5mg       Reflux - chronic and stable on Prilosec daily; sx's are well controlled      GYN- Dr. Hanna or Meli; she is repeated her TV US in Sept - repeat done due to LLQ pain; had a new fibroid, but no other changes; doing Q6 months now  mamm - never  last colon - 4/2017 (2 polyps)   last Pap summer 2017  DXA none  last eye exam; goes every 6 months   TdaP 8/07   Flu shot - fall 2017 @ work  EGD 12/30/13 - esophagitis       The following portions of the patient's history were reviewed and updated as appropriate: allergies, current medications, past family history, past medical  "history, past social history, past surgical history and problem list.    Current Outpatient Prescriptions:   •  albuterol (PROVENTIL HFA;VENTOLIN HFA) 108 (90 BASE) MCG/ACT inhaler, Inhale 2 puffs every 4 (four) hours as needed for wheezing., Disp: 1 inhaler, Rfl: 0  •  azelastine (ASTELIN) 0.1 % nasal spray, 2 sprays into each nostril 2 (two) times a day. Use in each nostril as directed, Disp: 1 each, Rfl: 12  •  BYSTOLIC 10 MG tablet, take 1 tablet by mouth once daily, Disp: 30 tablet, Rfl: 5  •  Cholecalciferol (VITAMIN D3) 2000 UNITS tablet, Take  by mouth., Disp: , Rfl:   •  cyanocobalamin 1000 MCG/ML injection, Inject 1 mL every 2 weeks, Disp: 30 mL, Rfl: 5  •  ferrous sulfate 325 (65 FE) MG tablet, Take 1 tablet by mouth 2 (Two) Times a Day With Meals., Disp: 60 tablet, Rfl: 5  •  fexofenadine (ALLEGRA ALLERGY) 180 MG tablet, Take 1 tablet by mouth daily., Disp: 30 tablet, Rfl: 5  •  fluticasone (FLONASE) 50 MCG/ACT nasal spray, 2 sprays to each nostril daily, Disp: , Rfl: 1  •  hydrochlorothiazide (HYDRODIURIL) 12.5 MG tablet, take 1 tablet by mouth once daily, Disp: 30 tablet, Rfl: 5  •  LO LOESTRIN FE 1 MG-10 MCG / 10 MCG tablet, 1 po qd, Disp: , Rfl: 1  •  lubiprostone (AMITIZA) 24 MCG capsule, Take 1 capsule by mouth 2 (Two) Times a Day With Meals., Disp: 60 capsule, Rfl: 2  •  Melatonin 3 MG tablet, Take  by mouth Every Night., Disp: , Rfl:   •  metFORMIN (GLUCOPHAGE) 500 MG tablet, take 1 tablet by mouth twice a day with food, Disp: 60 tablet, Rfl: 2  •  montelukast (SINGULAIR) 10 MG tablet, take 1 tablet by mouth at bedtime, Disp: 30 tablet, Rfl: 5  •  Omega-3 Fatty Acids (FISH OIL) 1200 MG capsule capsule, Take 1,200 mg by mouth Daily., Disp: , Rfl:   •  omeprazole (priLOSEC) 40 MG capsule, take 1 capsule by mouth twice a day, Disp: 60 capsule, Rfl: 5  •  Syringe/Needle, Disp, (BD LUER-MERON SYRINGE) 25G X 5/8\" 1 ML misc, Use for B12 injections once a week x 1 month, then once a month, Disp: 6 each, " "Rfl: 3     Review of Systems   Constitutional: Negative for chills, fatigue and fever.   Respiratory: Negative for cough, chest tightness and shortness of breath.    Cardiovascular: Negative for chest pain.   Gastrointestinal: Negative for abdominal pain, diarrhea, nausea and vomiting.   Endocrine: Negative for cold intolerance and heat intolerance.   Musculoskeletal: Negative for arthralgias.   Neurological: Negative for dizziness.       Objective   Physical Exam   Constitutional: She is oriented to person, place, and time. She appears well-developed and well-nourished.   HENT:   Head: Normocephalic and atraumatic.   Eyes: Conjunctivae and EOM are normal. Pupils are equal, round, and reactive to light.   Neck: Normal range of motion.   Cardiovascular: Normal rate, regular rhythm and normal heart sounds.    Pulmonary/Chest: Effort normal and breath sounds normal.   Abdominal: Soft. Bowel sounds are normal.   Musculoskeletal: Normal range of motion.   Neurological: She is alert and oriented to person, place, and time. She has normal reflexes.   Skin: Skin is warm and dry.   Psychiatric: She has a normal mood and affect. Her behavior is normal. Judgment and thought content normal.     Vitals:    12/04/17 1052   BP: 118/80   Pulse: 86   Resp: 16   Temp: 98.2 °F (36.8 °C)   TempSrc: Temporal Artery    SpO2: 99%   Weight: 100 kg (220 lb 7.4 oz)   Height: 154.9 cm (61\")         Assessment/Plan   Mariusz was seen today for discuss a recent abnormal ekg and potassium level.    Diagnoses and all orders for this visit:    Abnormal EKG  -     Adult Transthoracic Echo Complete W/ Cont if Necessary Per Protocol    Benign essential hypertension  -     Adult Transthoracic Echo Complete W/ Cont if Necessary Per Protocol    Allergic rhinitis, unspecified chronicity, unspecified seasonality, unspecified trigger    Gastroesophageal reflux disease, esophagitis presence not specified    Polycystic ovaries    History of vitamin D " deficiency    Iron deficiency anemia, unspecified iron deficiency anemia type      We will ck an echo  I do not see reason for stress test being as those she denies any and all cardiac sx's  No labs needed today, will ck at next visit.  F/U in about 6 weeks or sooner with any problems

## 2017-12-26 ENCOUNTER — HOSPITAL ENCOUNTER (OUTPATIENT)
Dept: CARDIOLOGY | Facility: HOSPITAL | Age: 42
Discharge: HOME | End: 2017-12-26
Admitting: NURSE PRACTITIONER

## 2017-12-26 VITALS — BODY MASS INDEX: 33.74 KG/M2 | HEIGHT: 67 IN | WEIGHT: 215 LBS

## 2017-12-26 LAB
BH CV ECHO MEAS - AO ROOT AREA (BSA CORRECTED): 1.5
BH CV ECHO MEAS - AO ROOT AREA: 6.6 CM^2
BH CV ECHO MEAS - AO ROOT DIAM: 2.9 CM
BH CV ECHO MEAS - BSA(HAYCOCK): 2.1 M^2
BH CV ECHO MEAS - BSA: 1.9 M^2
BH CV ECHO MEAS - BZI_BMI: 40.6 KILOGRAMS/M^2
BH CV ECHO MEAS - BZI_METRIC_HEIGHT: 154.9 CM
BH CV ECHO MEAS - BZI_METRIC_WEIGHT: 97.5 KG
BH CV ECHO MEAS - CONTRAST EF (2CH): 54.1 ML/M^2
BH CV ECHO MEAS - CONTRAST EF 4CH: 62.1 ML/M^2
BH CV ECHO MEAS - EDV(CUBED): 58.9 ML
BH CV ECHO MEAS - EDV(MOD-SP2): 74 ML
BH CV ECHO MEAS - EDV(MOD-SP4): 58 ML
BH CV ECHO MEAS - EDV(TEICH): 65.5 ML
BH CV ECHO MEAS - EF(CUBED): 70.1 %
BH CV ECHO MEAS - EF(MOD-SP2): 54.1 %
BH CV ECHO MEAS - EF(MOD-SP4): 62.1 %
BH CV ECHO MEAS - EF(TEICH): 62.4 %
BH CV ECHO MEAS - ESV(CUBED): 17.6 ML
BH CV ECHO MEAS - ESV(MOD-SP2): 34 ML
BH CV ECHO MEAS - ESV(MOD-SP4): 22 ML
BH CV ECHO MEAS - ESV(TEICH): 24.6 ML
BH CV ECHO MEAS - FS: 33.2 %
BH CV ECHO MEAS - IVS/LVPW: 1
BH CV ECHO MEAS - IVSD: 1 CM
BH CV ECHO MEAS - LA DIMENSION: 3.8 CM
BH CV ECHO MEAS - LA/AO: 1.3
BH CV ECHO MEAS - LAT PEAK E' VEL: 14.7 CM/SEC
BH CV ECHO MEAS - LV DIASTOLIC VOL/BSA (35-75): 29.8 ML/M^2
BH CV ECHO MEAS - LV MASS(C)D: 123.4 GRAMS
BH CV ECHO MEAS - LV MASS(C)DI: 63.3 GRAMS/M^2
BH CV ECHO MEAS - LV SYSTOLIC VOL/BSA (12-30): 11.3 ML/M^2
BH CV ECHO MEAS - LVIDD: 3.9 CM
BH CV ECHO MEAS - LVIDS: 2.6 CM
BH CV ECHO MEAS - LVLD AP2: 6.7 CM
BH CV ECHO MEAS - LVLD AP4: 7.4 CM
BH CV ECHO MEAS - LVLS AP2: 6.1 CM
BH CV ECHO MEAS - LVLS AP4: 6.4 CM
BH CV ECHO MEAS - LVPWD: 1 CM
BH CV ECHO MEAS - MED PEAK E' VEL: 7 CM/SEC
BH CV ECHO MEAS - MV A MAX VEL: 77.5 CM/SEC
BH CV ECHO MEAS - MV DEC SLOPE: 335 CM/SEC^2
BH CV ECHO MEAS - MV DEC TIME: 0.16 SEC
BH CV ECHO MEAS - MV E MAX VEL: 92.3 CM/SEC
BH CV ECHO MEAS - MV E/A: 1.2
BH CV ECHO MEAS - PA ACC SLOPE: 597.5 CM/SEC^2
BH CV ECHO MEAS - PA ACC TIME: 0.15 SEC
BH CV ECHO MEAS - PA PR(ACCEL): 13.5 MMHG
BH CV ECHO MEAS - RAP SYSTOLE: 5 MMHG
BH CV ECHO MEAS - RVDD: 2.9 CM
BH CV ECHO MEAS - RVSP: 27 MMHG
BH CV ECHO MEAS - SI(CUBED): 21.2 ML/M^2
BH CV ECHO MEAS - SI(MOD-SP2): 20.5 ML/M^2
BH CV ECHO MEAS - SI(MOD-SP4): 18.5 ML/M^2
BH CV ECHO MEAS - SI(TEICH): 21 ML/M^2
BH CV ECHO MEAS - SV(CUBED): 41.3 ML
BH CV ECHO MEAS - SV(MOD-SP2): 40 ML
BH CV ECHO MEAS - SV(MOD-SP4): 36 ML
BH CV ECHO MEAS - SV(TEICH): 40.9 ML
BH CV ECHO MEAS - TAPSE (>1.6): 2.6 CM2
BH CV ECHO MEAS - TR MAX VEL: 234.3 CM/SEC
BH CV VAS BP RIGHT ARM: NORMAL MMHG
BH CV XLRA - RV BASE: 3.5 CM
BH CV XLRA - RV LENGTH: 6.1 CM
BH CV XLRA - RV MID: 3.7 CM
BH CV XLRA - TDI S': 13.6 CM/SEC
E/E' RATIO: 10.8
LV EF 2D ECHO EST: 65 %
MAXIMAL PREDICTED HEART RATE: 180 BPM
STRESS TARGET HR: 153 BPM

## 2017-12-26 PROCEDURE — 93306 TTE W/DOPPLER COMPLETE: CPT | Performed by: INTERNAL MEDICINE

## 2017-12-26 PROCEDURE — 93306 TTE W/DOPPLER COMPLETE: CPT

## 2017-12-26 RX ORDER — FLUTICASONE PROPIONATE 50 MCG
SPRAY, SUSPENSION (ML) NASAL
Qty: 16 G | Refills: 11 | Status: SHIPPED | OUTPATIENT
Start: 2017-12-26 | End: 2019-03-01 | Stop reason: SDUPTHER

## 2018-01-03 ENCOUNTER — TELEPHONE (OUTPATIENT)
Dept: INTERNAL MEDICINE | Facility: CLINIC | Age: 43
End: 2018-01-03

## 2018-01-03 NOTE — TELEPHONE ENCOUNTER
----- Message from DAVIN Jo sent at 1/2/2018  9:06 AM EST -----  Echo shows mild Tricuspid regurgitation, which means that this valve does not close off quite as tight is allowing a slight backflow.  This nothing to be concerned about unless it becomes worse and she would be symptomatic with SOA, fatigue, etc.      Otherwise echo was within acceptable limits.

## 2018-01-03 NOTE — TELEPHONE ENCOUNTER
GAVE PT MESSAGE SHE SAW THIS ON MY CHART HAS NO QUESTION RIGHT NOW WILL SEE SANCHEZ THE 15TH IF YOU NEED TO CALL HER BACK CALLED AFTER 2:45 TEACHER CAN NOT ANSWER

## 2018-01-11 ENCOUNTER — TELEPHONE (OUTPATIENT)
Dept: INTERNAL MEDICINE | Facility: CLINIC | Age: 43
End: 2018-01-11

## 2018-01-11 DIAGNOSIS — Z79.899 ENCOUNTER FOR LONG-TERM (CURRENT) USE OF MEDICATIONS: ICD-10-CM

## 2018-01-11 DIAGNOSIS — I10 BENIGN ESSENTIAL HYPERTENSION: Primary | ICD-10-CM

## 2018-01-11 DIAGNOSIS — E04.1 THYROID NODULE: ICD-10-CM

## 2018-01-11 DIAGNOSIS — D64.9 ANEMIA, UNSPECIFIED TYPE: ICD-10-CM

## 2018-01-11 DIAGNOSIS — Z86.39 HISTORY OF VITAMIN D DEFICIENCY: ICD-10-CM

## 2018-01-11 DIAGNOSIS — K21.9 GASTROESOPHAGEAL REFLUX DISEASE, ESOPHAGITIS PRESENCE NOT SPECIFIED: ICD-10-CM

## 2018-01-12 ENCOUNTER — LAB (OUTPATIENT)
Dept: INTERNAL MEDICINE | Facility: CLINIC | Age: 43
End: 2018-01-12

## 2018-01-12 DIAGNOSIS — K21.9 GASTROESOPHAGEAL REFLUX DISEASE, ESOPHAGITIS PRESENCE NOT SPECIFIED: ICD-10-CM

## 2018-01-12 DIAGNOSIS — I10 BENIGN ESSENTIAL HYPERTENSION: ICD-10-CM

## 2018-01-12 DIAGNOSIS — D64.9 ANEMIA, UNSPECIFIED TYPE: ICD-10-CM

## 2018-01-12 DIAGNOSIS — Z79.899 ENCOUNTER FOR LONG-TERM (CURRENT) USE OF MEDICATIONS: ICD-10-CM

## 2018-01-12 DIAGNOSIS — Z86.39 HISTORY OF VITAMIN D DEFICIENCY: ICD-10-CM

## 2018-01-12 DIAGNOSIS — E04.1 THYROID NODULE: ICD-10-CM

## 2018-01-12 LAB
25(OH)D3 SERPL-MCNC: 39.4 NG/ML
ALBUMIN SERPL-MCNC: 4.3 G/DL (ref 3.2–4.8)
ALBUMIN/GLOB SERPL: 1.6 G/DL (ref 1.5–2.5)
ALP SERPL-CCNC: 44 U/L (ref 25–100)
ALT SERPL W P-5'-P-CCNC: 20 U/L (ref 7–40)
ANION GAP SERPL CALCULATED.3IONS-SCNC: 11 MMOL/L (ref 3–11)
ARTICHOKE IGE QN: 146 MG/DL (ref 0–130)
AST SERPL-CCNC: 19 U/L (ref 0–33)
BASOPHILS # BLD AUTO: 0.02 10*3/MM3 (ref 0–0.2)
BASOPHILS NFR BLD AUTO: 0.2 % (ref 0–1)
BILIRUB SERPL-MCNC: 0.6 MG/DL (ref 0.3–1.2)
BUN BLD-MCNC: 12 MG/DL (ref 9–23)
BUN/CREAT SERPL: 15 (ref 7–25)
CALCIUM SPEC-SCNC: 9.4 MG/DL (ref 8.7–10.4)
CHLORIDE SERPL-SCNC: 105 MMOL/L (ref 99–109)
CHOLEST SERPL-MCNC: 216 MG/DL (ref 0–200)
CO2 SERPL-SCNC: 24 MMOL/L (ref 20–31)
CREAT BLD-MCNC: 0.8 MG/DL (ref 0.6–1.3)
DEPRECATED RDW RBC AUTO: 51.5 FL (ref 37–54)
EOSINOPHIL # BLD AUTO: 0.2 10*3/MM3 (ref 0–0.3)
EOSINOPHIL NFR BLD AUTO: 2.2 % (ref 0–3)
ERYTHROCYTE [DISTWIDTH] IN BLOOD BY AUTOMATED COUNT: 15.8 % (ref 11.3–14.5)
FERRITIN SERPL-MCNC: 29 NG/ML (ref 10–291)
FOLATE SERPL-MCNC: 4.55 NG/ML (ref 3.2–20)
GFR SERPL CREATININE-BSD FRML MDRD: 96 ML/MIN/1.73
GLOBULIN UR ELPH-MCNC: 2.7 GM/DL
GLUCOSE BLD-MCNC: 90 MG/DL (ref 70–100)
HCT VFR BLD AUTO: 37.6 % (ref 34.5–44)
HDLC SERPL-MCNC: 69 MG/DL (ref 40–60)
HGB BLD-MCNC: 12.3 G/DL (ref 11.5–15.5)
IMM GRANULOCYTES # BLD: 0.01 10*3/MM3 (ref 0–0.03)
IMM GRANULOCYTES NFR BLD: 0.1 % (ref 0–0.6)
IRON 24H UR-MRATE: 88 MCG/DL (ref 50–175)
IRON SATN MFR SERPL: 20 % (ref 15–50)
LYMPHOCYTES # BLD AUTO: 3.86 10*3/MM3 (ref 0.6–4.8)
LYMPHOCYTES NFR BLD AUTO: 42.8 % (ref 24–44)
MCH RBC QN AUTO: 29.1 PG (ref 27–31)
MCHC RBC AUTO-ENTMCNC: 32.7 G/DL (ref 32–36)
MCV RBC AUTO: 89.1 FL (ref 80–99)
MONOCYTES # BLD AUTO: 0.67 10*3/MM3 (ref 0–1)
MONOCYTES NFR BLD AUTO: 7.4 % (ref 0–12)
NEUTROPHILS # BLD AUTO: 4.26 10*3/MM3 (ref 1.5–8.3)
NEUTROPHILS NFR BLD AUTO: 47.3 % (ref 41–71)
PLATELET # BLD AUTO: 302 10*3/MM3 (ref 150–450)
PMV BLD AUTO: 10.7 FL (ref 6–12)
POTASSIUM BLD-SCNC: 4 MMOL/L (ref 3.5–5.5)
PROT SERPL-MCNC: 7 G/DL (ref 5.7–8.2)
RBC # BLD AUTO: 4.22 10*6/MM3 (ref 3.89–5.14)
SODIUM BLD-SCNC: 140 MMOL/L (ref 132–146)
TIBC SERPL-MCNC: 451 MCG/DL (ref 250–450)
TRIGL SERPL-MCNC: 115 MG/DL (ref 0–150)
TSH SERPL DL<=0.05 MIU/L-ACNC: 2.37 MIU/ML (ref 0.35–5.35)
VIT B12 BLD-MCNC: 660 PG/ML (ref 211–911)
WBC NRBC COR # BLD: 9.02 10*3/MM3 (ref 3.5–10.8)

## 2018-01-12 PROCEDURE — 82607 VITAMIN B-12: CPT | Performed by: NURSE PRACTITIONER

## 2018-01-12 PROCEDURE — 82728 ASSAY OF FERRITIN: CPT | Performed by: NURSE PRACTITIONER

## 2018-01-12 PROCEDURE — 80061 LIPID PANEL: CPT | Performed by: NURSE PRACTITIONER

## 2018-01-12 PROCEDURE — 82746 ASSAY OF FOLIC ACID SERUM: CPT | Performed by: NURSE PRACTITIONER

## 2018-01-12 PROCEDURE — 84443 ASSAY THYROID STIM HORMONE: CPT | Performed by: NURSE PRACTITIONER

## 2018-01-12 PROCEDURE — 83540 ASSAY OF IRON: CPT | Performed by: NURSE PRACTITIONER

## 2018-01-12 PROCEDURE — 83550 IRON BINDING TEST: CPT | Performed by: NURSE PRACTITIONER

## 2018-01-12 PROCEDURE — 36415 COLL VENOUS BLD VENIPUNCTURE: CPT

## 2018-01-12 PROCEDURE — 85025 COMPLETE CBC W/AUTO DIFF WBC: CPT | Performed by: NURSE PRACTITIONER

## 2018-01-12 PROCEDURE — 82306 VITAMIN D 25 HYDROXY: CPT | Performed by: NURSE PRACTITIONER

## 2018-01-12 PROCEDURE — 80053 COMPREHEN METABOLIC PANEL: CPT | Performed by: NURSE PRACTITIONER

## 2018-01-15 ENCOUNTER — OFFICE VISIT (OUTPATIENT)
Dept: INTERNAL MEDICINE | Facility: CLINIC | Age: 43
End: 2018-01-15

## 2018-01-15 VITALS
TEMPERATURE: 98.3 F | OXYGEN SATURATION: 99 % | SYSTOLIC BLOOD PRESSURE: 110 MMHG | DIASTOLIC BLOOD PRESSURE: 80 MMHG | WEIGHT: 220.6 LBS | RESPIRATION RATE: 16 BRPM | HEART RATE: 76 BPM | HEIGHT: 67 IN | BODY MASS INDEX: 34.62 KG/M2

## 2018-01-15 DIAGNOSIS — J30.9 ALLERGIC RHINITIS, UNSPECIFIED CHRONICITY, UNSPECIFIED SEASONALITY, UNSPECIFIED TRIGGER: ICD-10-CM

## 2018-01-15 DIAGNOSIS — K21.9 GASTROESOPHAGEAL REFLUX DISEASE, ESOPHAGITIS PRESENCE NOT SPECIFIED: ICD-10-CM

## 2018-01-15 DIAGNOSIS — I10 BENIGN ESSENTIAL HYPERTENSION: Primary | ICD-10-CM

## 2018-01-15 PROCEDURE — 99213 OFFICE O/P EST LOW 20 MIN: CPT | Performed by: NURSE PRACTITIONER

## 2018-01-15 RX ORDER — DICYCLOMINE HYDROCHLORIDE 10 MG/1
CAPSULE ORAL
Refills: 0 | COMMUNITY
Start: 2017-12-14 | End: 2018-11-29

## 2018-01-15 NOTE — PROGRESS NOTES
Subjective   Mariusz Yepez is a 42 y.o. female    Chief Complaint   Patient presents with   • 6 week follow up     Labs done prior to appointment     History of Present Illness     Here for f/u     Pt had GB removed on 11/30/17 per Dr. Lafleur.  During her pre-op, she was told her EKG was abnormal.  In review, it does show LVH and abn ST depression in the anterior leads.  She denies any sx's.  States that she climbs 3 flights of stairs multiple times per day at work w/o problems.  I sent her for an echo after her last visit and it showed mild tricuspid valve regurgitation, but was otherwise normal.  She denies any complaints     Constipation/abdominal pain is much better since her GB was removed.  Followed by [facility].       Was found to be anemic; now taking ferrous sulfate and doing B12 injections every other week.  B12 and iron looks much better now.        Allergy sx's are better. I referred her to an allergist in August 2016. She added Qvar to her Singulair, Albuterol, flonase and astelin. She had not had to use her inhaler until just recently      HTN - chronic and stable on Bystolic 10mg tablet and HCTZ 12.5mg       Reflux - chronic and stable on Prilosec daily; sx's are well controlled      GYN- Dr. Hanna; she is repeated her TV US in Sept - repeat done due to LLQ pain; had a new fibroid, but no other changes; doing Q6 months now.  Next US is coming up soon  mamm - 9/6/2017  last colon - 4/2017 (2 polyps)   last Pap summer 2017  DXA none  last eye exam; goes every 6 months   TdaP 8/07   Flu shot - fall 2017 @ work  EGD 12/30/13 - esophagitis    The following portions of the patient's history were reviewed and updated as appropriate: allergies, current medications, past family history, past medical history, past social history, past surgical history and problem list.    Current Outpatient Prescriptions:   •  albuterol (PROVENTIL HFA;VENTOLIN HFA) 108 (90 BASE) MCG/ACT inhaler, Inhale 2 puffs every 4 (four)  "hours as needed for wheezing., Disp: 1 inhaler, Rfl: 0  •  azelastine (ASTELIN) 0.1 % nasal spray, 2 sprays into each nostril 2 (two) times a day. Use in each nostril as directed, Disp: 1 each, Rfl: 12  •  BYSTOLIC 10 MG tablet, take 1 tablet by mouth once daily, Disp: 30 tablet, Rfl: 5  •  Cholecalciferol (VITAMIN D3) 2000 UNITS tablet, Take  by mouth., Disp: , Rfl:   •  cyanocobalamin 1000 MCG/ML injection, Inject 1 mL every 2 weeks, Disp: 30 mL, Rfl: 5  •  dicyclomine (BENTYL) 10 MG capsule, take 1 capsule by mouth three times a day if needed, Disp: , Rfl: 0  •  ferrous sulfate 325 (65 FE) MG tablet, Take 1 tablet by mouth 2 (Two) Times a Day With Meals., Disp: 60 tablet, Rfl: 5  •  fexofenadine (ALLEGRA ALLERGY) 180 MG tablet, Take 1 tablet by mouth daily., Disp: 30 tablet, Rfl: 5  •  fluticasone (FLONASE) 50 MCG/ACT nasal spray, inhale 2 sprays into each nostril once daily, Disp: 16 g, Rfl: 11  •  hydrochlorothiazide (HYDRODIURIL) 12.5 MG tablet, take 1 tablet by mouth once daily, Disp: 30 tablet, Rfl: 5  •  LO LOESTRIN FE 1 MG-10 MCG / 10 MCG tablet, 1 po qd, Disp: , Rfl: 1  •  lubiprostone (AMITIZA) 24 MCG capsule, Take 1 capsule by mouth 2 (Two) Times a Day With Meals., Disp: 60 capsule, Rfl: 2  •  Melatonin 3 MG tablet, Take  by mouth Every Night., Disp: , Rfl:   •  metFORMIN (GLUCOPHAGE) 500 MG tablet, take 1 tablet by mouth twice a day with food, Disp: 60 tablet, Rfl: 2  •  montelukast (SINGULAIR) 10 MG tablet, take 1 tablet by mouth at bedtime, Disp: 30 tablet, Rfl: 5  •  Omega-3 Fatty Acids (FISH OIL) 1200 MG capsule capsule, Take 1,200 mg by mouth Daily., Disp: , Rfl:   •  omeprazole (priLOSEC) 40 MG capsule, take 1 capsule by mouth twice a day, Disp: 60 capsule, Rfl: 5  •  Syringe/Needle, Disp, (BD LUER-MERON SYRINGE) 25G X 5/8\" 1 ML misc, Use for B12 injections once a week x 1 month, then once a month, Disp: 6 each, Rfl: 3     Review of Systems   Constitutional: Negative for chills, fatigue and fever. " "  Respiratory: Negative for cough, chest tightness and shortness of breath.    Cardiovascular: Negative for chest pain.   Gastrointestinal: Negative for abdominal pain, diarrhea, nausea and vomiting.   Endocrine: Negative for cold intolerance and heat intolerance.   Musculoskeletal: Negative for arthralgias.   Neurological: Negative for dizziness.       Objective   Physical Exam   Constitutional: She is oriented to person, place, and time. She appears well-developed and well-nourished.   HENT:   Head: Normocephalic and atraumatic.   Eyes: Conjunctivae and EOM are normal. Pupils are equal, round, and reactive to light.   Neck: Normal range of motion.   Cardiovascular: Normal rate, regular rhythm and normal heart sounds.    Pulmonary/Chest: Effort normal and breath sounds normal.   Abdominal: Soft. Bowel sounds are normal.   Musculoskeletal: Normal range of motion.   Neurological: She is alert and oriented to person, place, and time. She has normal reflexes.   Skin: Skin is warm and dry.   Psychiatric: She has a normal mood and affect. Her behavior is normal. Judgment and thought content normal.     Vitals:    01/15/18 1116   BP: 110/80   Pulse: 76   Resp: 16   Temp: 98.3 °F (36.8 °C)   TempSrc: Temporal Artery    SpO2: 99%   Weight: 100 kg (220 lb 9.6 oz)   Height: 169 cm (66.54\")         Assessment/Plan   Mariusz was seen today for 6 week follow up.    Diagnoses and all orders for this visit:    Benign essential hypertension    Allergic rhinitis, unspecified chronicity, unspecified seasonality, unspecified trigger    Gastroesophageal reflux disease, esophagitis presence not specified      Labs reviewed  No changes  F/U in 6 months or sooner with any problems         "

## 2018-03-21 ENCOUNTER — OFFICE VISIT (OUTPATIENT)
Dept: INTERNAL MEDICINE | Facility: CLINIC | Age: 43
End: 2018-03-21

## 2018-03-21 VITALS
OXYGEN SATURATION: 98 % | SYSTOLIC BLOOD PRESSURE: 112 MMHG | TEMPERATURE: 98.1 F | HEART RATE: 82 BPM | BODY MASS INDEX: 33.37 KG/M2 | WEIGHT: 212.6 LBS | HEIGHT: 67 IN | DIASTOLIC BLOOD PRESSURE: 80 MMHG

## 2018-03-21 DIAGNOSIS — R19.7 DIARRHEA IN ADULT PATIENT: ICD-10-CM

## 2018-03-21 DIAGNOSIS — K52.9 GASTROENTERITIS: Primary | ICD-10-CM

## 2018-03-21 PROCEDURE — 99213 OFFICE O/P EST LOW 20 MIN: CPT | Performed by: NURSE PRACTITIONER

## 2018-03-21 NOTE — PROGRESS NOTES
Subjective   Mariusz Yepez is a 42 y.o. female.     Diarrhea    This is a new problem. The current episode started in the past 7 days. Episode frequency: was all day X 2 day and has then been 0-2 times a day since.  The problem has been gradually improving. The stool consistency is described as mucous and watery. The patient states that diarrhea does not awaken her from sleep. Associated symptoms include abdominal pain. Pertinent negatives include no arthralgias, bloating, chills, coughing, fever, headaches, increased  flatus, myalgias, sweats, URI, vomiting or weight loss. Nothing aggravates the symptoms. There are no known risk factors. She has tried change of diet, increased fluids and anti-motility drug for the symptoms. The treatment provided moderate relief. Her past medical history is significant for irritable bowel syndrome and a recent abdominal surgery (cholecystectomy in 11/2017). There is no history of bowel resection, inflammatory bowel disease, malabsorption or short gut syndrome.    started Friday after eating popcorn.   Reports 2 colonoscopies in the past and no diverticulosis/itis.   Has not been eating a lot as she has loose stools after eating. Yesterday was able to eat chicken, bread and crackers without any loose stools. No loose stools today. Has not been taking Bentyl. Took one dose over the weekend and had no loose stools that day.   She has a call into GI to reschedule her appt she missed with them yesterday.    The following portions of the patient's history were reviewed and updated as appropriate: allergies, current medications, past family history, past medical history, past social history, past surgical history and problem list.    Review of Systems   Constitutional: Negative for appetite change, chills, diaphoresis, fatigue, fever, unexpected weight change and weight loss.   Respiratory: Negative for cough, choking, chest tightness and shortness of breath.    Cardiovascular:  Negative for chest pain.   Gastrointestinal: Positive for abdominal pain and diarrhea. Negative for abdominal distention, bloating, blood in stool, constipation, flatus, nausea and vomiting.   Musculoskeletal: Negative for arthralgias and myalgias.   Skin: Negative.    Neurological: Negative for headaches.       Objective   Physical Exam   Constitutional: She appears well-developed and well-nourished. No distress.   Eyes: Conjunctivae are normal. No scleral icterus.   Neck: Normal range of motion. Neck supple.   Cardiovascular: Normal rate, regular rhythm and normal heart sounds.    Pulmonary/Chest: Effort normal and breath sounds normal.   Abdominal: Soft. Bowel sounds are normal. She exhibits no shifting dullness, no distension, no abdominal bruit and no mass. There is generalized tenderness (mild). There is no rigidity, no rebound, no guarding, no CVA tenderness, no tenderness at McBurney's point and negative Dong's sign. No hernia.   Skin: Skin is warm and dry. No rash noted. She is not diaphoretic. No erythema. No pallor.   Nursing note and vitals reviewed.      Assessment/Plan      Mariusz was seen today for unable to eat.    Diagnoses and all orders for this visit:    Gastroenteritis    Diarrhea in adult patient      Kern diet X 24 hours. Increase as tolerated   Increase fluids  Has not been taking her bentyl- may resume.   FU with Gi as planned  Return if symptoms worsen or fail to improve.  Plan of care discussed with pt. They verbalized understanding and agreement.

## 2018-03-29 ENCOUNTER — OFFICE VISIT (OUTPATIENT)
Dept: INTERNAL MEDICINE | Facility: CLINIC | Age: 43
End: 2018-03-29

## 2018-03-29 VITALS
WEIGHT: 214 LBS | TEMPERATURE: 97.6 F | HEART RATE: 85 BPM | RESPIRATION RATE: 16 BRPM | BODY MASS INDEX: 33.59 KG/M2 | SYSTOLIC BLOOD PRESSURE: 110 MMHG | HEIGHT: 67 IN | OXYGEN SATURATION: 99 % | DIASTOLIC BLOOD PRESSURE: 78 MMHG

## 2018-03-29 DIAGNOSIS — K91.5 POST-CHOLECYSTECTOMY SYNDROME: ICD-10-CM

## 2018-03-29 DIAGNOSIS — R19.7 DIARRHEA, UNSPECIFIED TYPE: Primary | ICD-10-CM

## 2018-03-29 PROCEDURE — 99214 OFFICE O/P EST MOD 30 MIN: CPT | Performed by: NURSE PRACTITIONER

## 2018-03-29 RX ORDER — MONTELUKAST SODIUM 4 MG/1
2 TABLET, CHEWABLE ORAL 2 TIMES DAILY
Qty: 120 TABLET | Refills: 2 | Status: SHIPPED | OUTPATIENT
Start: 2018-03-29 | End: 2018-06-14 | Stop reason: SDUPTHER

## 2018-03-29 RX ORDER — METFORMIN HYDROCHLORIDE 500 MG/1
500 TABLET, EXTENDED RELEASE ORAL 2 TIMES DAILY
Qty: 60 TABLET | Refills: 5 | Status: SHIPPED | OUTPATIENT
Start: 2018-03-29 | End: 2018-09-17 | Stop reason: SDUPTHER

## 2018-03-29 NOTE — PROGRESS NOTES
Subjective   Mariusz Yepez is a 42 y.o. female    Chief Complaint   Patient presents with   • Diarrhea     Happens pretty much after eating every meal. Very watery diarrhea. Does not eat much at all during the day. Dr. Arnago told her to take Ginger root and prescribed Zofan.      Diarrhea    This is a recurrent problem. The current episode started 1 to 4 weeks ago. The problem occurs 2 to 4 times per day. The problem has been waxing and waning. The stool consistency is described as watery. The patient states that diarrhea awakens her from sleep. Associated symptoms include increased flatus. Pertinent negatives include no abdominal pain, arthralgias, chills, coughing, fever, headaches, myalgias, sweats, URI, vomiting or weight loss. Exacerbated by: eating. Risk factors: kaylin valdovinos in 11/2017. She has tried anti-motility drug (bentyl) for the symptoms. The treatment provided mild relief. Her past medical history is significant for a recent abdominal surgery (kaylin ray in 11/2017). There is no history of bowel resection, inflammatory bowel disease, irritable bowel syndrome, malabsorption or short gut syndrome.        The following portions of the patient's history were reviewed and updated as appropriate: allergies, current medications, past family history, past medical history, past social history, past surgical history and problem list.    Current Outpatient Prescriptions:   •  albuterol (PROVENTIL HFA;VENTOLIN HFA) 108 (90 BASE) MCG/ACT inhaler, Inhale 2 puffs every 4 (four) hours as needed for wheezing., Disp: 1 inhaler, Rfl: 0  •  azelastine (ASTELIN) 0.1 % nasal spray, 2 sprays into each nostril 2 (two) times a day. Use in each nostril as directed, Disp: 1 each, Rfl: 12  •  BYSTOLIC 10 MG tablet, take 1 tablet by mouth once daily, Disp: 30 tablet, Rfl: 5  •  Cholecalciferol (VITAMIN D3) 2000 UNITS tablet, Take  by mouth., Disp: , Rfl:   •  colestipol (COLESTID) 1 g tablet, Take 2 tablets by mouth 2 (Two)  "Times a Day., Disp: 120 tablet, Rfl: 2  •  cyanocobalamin 1000 MCG/ML injection, Inject 1 mL every 2 weeks, Disp: 30 mL, Rfl: 5  •  dicyclomine (BENTYL) 10 MG capsule, take 1 capsule by mouth three times a day if needed, Disp: , Rfl: 0  •  ferrous sulfate 325 (65 FE) MG tablet, Take 1 tablet by mouth 2 (Two) Times a Day With Meals., Disp: 60 tablet, Rfl: 5  •  fexofenadine (ALLEGRA ALLERGY) 180 MG tablet, Take 1 tablet by mouth daily., Disp: 30 tablet, Rfl: 5  •  fluticasone (FLONASE) 50 MCG/ACT nasal spray, inhale 2 sprays into each nostril once daily, Disp: 16 g, Rfl: 11  •  hydrochlorothiazide (HYDRODIURIL) 12.5 MG tablet, take 1 tablet by mouth once daily, Disp: 30 tablet, Rfl: 5  •  LO LOESTRIN FE 1 MG-10 MCG / 10 MCG tablet, 1 po qd, Disp: , Rfl: 1  •  Melatonin 3 MG tablet, Take  by mouth Every Night., Disp: , Rfl:   •  metFORMIN ER (GLUCOPHAGE-XR) 500 MG 24 hr tablet, Take 1 tablet by mouth 2 (Two) Times a Day., Disp: 60 tablet, Rfl: 5  •  montelukast (SINGULAIR) 10 MG tablet, take 1 tablet by mouth at bedtime, Disp: 30 tablet, Rfl: 5  •  Omega-3 Fatty Acids (FISH OIL) 1200 MG capsule capsule, Take 1,200 mg by mouth Daily., Disp: , Rfl:   •  omeprazole (priLOSEC) 40 MG capsule, take 1 capsule by mouth twice a day, Disp: 60 capsule, Rfl: 5  •  Syringe/Needle, Disp, (BD LUER-MERON SYRINGE) 25G X 5/8\" 1 ML misc, Use for B12 injections once a week x 1 month, then once a month, Disp: 6 each, Rfl: 3     Review of Systems   Constitutional: Negative for chills, fatigue, fever and weight loss.   Respiratory: Negative for cough, chest tightness and shortness of breath.    Cardiovascular: Negative for chest pain.   Gastrointestinal: Positive for diarrhea and flatus. Negative for abdominal distention, abdominal pain, anal bleeding, blood in stool, constipation, nausea, rectal pain and vomiting.   Endocrine: Negative for cold intolerance and heat intolerance.   Musculoskeletal: Negative for arthralgias and myalgias. " "  Neurological: Negative for dizziness and headaches.       Objective   Physical Exam   Constitutional: She is oriented to person, place, and time. She appears well-developed and well-nourished.   HENT:   Head: Normocephalic and atraumatic.   Eyes: Conjunctivae and EOM are normal. Pupils are equal, round, and reactive to light.   Neck: Normal range of motion.   Cardiovascular: Normal rate, regular rhythm and normal heart sounds.    Pulmonary/Chest: Effort normal and breath sounds normal.   Abdominal: Soft. Bowel sounds are normal.   Musculoskeletal: Normal range of motion.   Neurological: She is alert and oriented to person, place, and time. She has normal reflexes.   Skin: Skin is warm and dry.   Psychiatric: She has a normal mood and affect. Her behavior is normal. Judgment and thought content normal.     Vitals:    03/29/18 0836   BP: 110/78   Pulse: 85   Resp: 16   Temp: 97.6 °F (36.4 °C)   TempSrc: Temporal Artery    SpO2: 99%   Weight: 97.1 kg (214 lb)   Height: 169 cm (66.54\")         Assessment/Plan   Mariusz was seen today for diarrhea.    Diagnoses and all orders for this visit:    Diarrhea, unspecified type  -     Clostridium Difficile Toxin - Stool, Per Rectum  -     Fecal Leukocytes - Stool, Per Rectum  -     Ova & Parasite Examination - Stool, Per Rectum  -     Rotavirus Antigen, Stool - Stool, Per Rectum  -     Stool Culture With Yersinia - Stool, Per Rectum  -     colestipol (COLESTID) 1 g tablet; Take 2 tablets by mouth 2 (Two) Times a Day.    Post-cholecystectomy syndrome  -     colestipol (COLESTID) 1 g tablet; Take 2 tablets by mouth 2 (Two) Times a Day.    Other orders  -     metFORMIN ER (GLUCOPHAGE-XR) 500 MG 24 hr tablet; Take 1 tablet by mouth 2 (Two) Times a Day.      Stop Bentyl and change to Colestid  Metformin changed to XR dosing  Sent with materials to collect stool studies  Bulk up with fiber  Will need to see her GI is sx's are not improving           "

## 2018-04-02 LAB
C DIFF TOX GENS STL QL NAA+PROBE: NOT DETECTED
RV AG STL QL IA: NEGATIVE
WBC STL QL MICRO: NORMAL

## 2018-04-02 PROCEDURE — 87209 SMEAR COMPLEX STAIN: CPT | Performed by: NURSE PRACTITIONER

## 2018-04-02 PROCEDURE — 87045 FECES CULTURE AEROBIC BACT: CPT | Performed by: NURSE PRACTITIONER

## 2018-04-02 PROCEDURE — 87046 STOOL CULTR AEROBIC BACT EA: CPT | Performed by: NURSE PRACTITIONER

## 2018-04-02 PROCEDURE — 87493 C DIFF AMPLIFIED PROBE: CPT | Performed by: NURSE PRACTITIONER

## 2018-04-02 PROCEDURE — 87205 SMEAR GRAM STAIN: CPT | Performed by: NURSE PRACTITIONER

## 2018-04-02 PROCEDURE — 87425 ROTAVIRUS AG IA: CPT | Performed by: NURSE PRACTITIONER

## 2018-04-02 PROCEDURE — 87177 OVA AND PARASITES SMEARS: CPT | Performed by: NURSE PRACTITIONER

## 2018-04-04 LAB
O+P SPEC MICRO: NORMAL
O+P STL TRI STN: NORMAL

## 2018-04-06 LAB — BACTERIA STL CULT: NORMAL

## 2018-04-16 RX ORDER — NEBIVOLOL HYDROCHLORIDE 10 MG/1
TABLET ORAL
Qty: 30 TABLET | Refills: 5 | Status: SHIPPED | OUTPATIENT
Start: 2018-04-16 | End: 2018-11-29

## 2018-04-30 RX ORDER — OMEPRAZOLE 40 MG/1
CAPSULE, DELAYED RELEASE ORAL
Qty: 60 CAPSULE | Refills: 5 | Status: SHIPPED | OUTPATIENT
Start: 2018-04-30 | End: 2018-10-19 | Stop reason: SDUPTHER

## 2018-04-30 RX ORDER — FERROUS SULFATE 325(65) MG
TABLET ORAL
Qty: 60 TABLET | Refills: 4 | Status: SHIPPED | OUTPATIENT
Start: 2018-04-30 | End: 2018-09-23 | Stop reason: SDUPTHER

## 2018-05-10 ENCOUNTER — TELEPHONE (OUTPATIENT)
Dept: INTERNAL MEDICINE | Facility: CLINIC | Age: 43
End: 2018-05-10

## 2018-05-10 DIAGNOSIS — R05.9 COUGH: ICD-10-CM

## 2018-05-10 DIAGNOSIS — J06.9 ACUTE URI: ICD-10-CM

## 2018-05-10 RX ORDER — ALBUTEROL SULFATE 90 UG/1
2 AEROSOL, METERED RESPIRATORY (INHALATION) EVERY 4 HOURS PRN
Qty: 1 INHALER | Refills: 2 | Status: SHIPPED | OUTPATIENT
Start: 2018-05-10

## 2018-05-17 DIAGNOSIS — I10 BENIGN ESSENTIAL HYPERTENSION: ICD-10-CM

## 2018-05-17 DIAGNOSIS — J30.9 ALLERGIC RHINITIS: ICD-10-CM

## 2018-05-17 RX ORDER — MONTELUKAST SODIUM 10 MG/1
TABLET ORAL
Qty: 30 TABLET | Refills: 5 | Status: SHIPPED | OUTPATIENT
Start: 2018-05-17 | End: 2018-11-20 | Stop reason: SDUPTHER

## 2018-05-17 RX ORDER — HYDROCHLOROTHIAZIDE 12.5 MG/1
TABLET ORAL
Qty: 30 TABLET | Refills: 5 | Status: SHIPPED | OUTPATIENT
Start: 2018-05-17 | End: 2018-11-20 | Stop reason: SDUPTHER

## 2018-06-14 DIAGNOSIS — R19.7 DIARRHEA, UNSPECIFIED TYPE: ICD-10-CM

## 2018-06-14 DIAGNOSIS — K91.5 POST-CHOLECYSTECTOMY SYNDROME: ICD-10-CM

## 2018-06-14 RX ORDER — MONTELUKAST SODIUM 4 MG/1
TABLET, CHEWABLE ORAL
Qty: 120 TABLET | Refills: 0 | Status: SHIPPED | OUTPATIENT
Start: 2018-06-14 | End: 2018-07-28 | Stop reason: SDUPTHER

## 2018-06-18 ENCOUNTER — OFFICE VISIT (OUTPATIENT)
Dept: INTERNAL MEDICINE | Facility: CLINIC | Age: 43
End: 2018-06-18

## 2018-06-18 VITALS
DIASTOLIC BLOOD PRESSURE: 70 MMHG | SYSTOLIC BLOOD PRESSURE: 110 MMHG | TEMPERATURE: 97.4 F | WEIGHT: 208.8 LBS | BODY MASS INDEX: 32.77 KG/M2 | HEIGHT: 67 IN | RESPIRATION RATE: 16 BRPM

## 2018-06-18 DIAGNOSIS — I10 BENIGN ESSENTIAL HYPERTENSION: Primary | ICD-10-CM

## 2018-06-18 PROCEDURE — 99214 OFFICE O/P EST MOD 30 MIN: CPT | Performed by: NURSE PRACTITIONER

## 2018-06-18 RX ORDER — TRIAMCINOLONE ACETONIDE 1 MG/G
CREAM TOPICAL 2 TIMES DAILY
Qty: 45 G | Refills: 2 | Status: SHIPPED | OUTPATIENT
Start: 2018-06-18 | End: 2020-01-31

## 2018-06-18 RX ORDER — CARVEDILOL 6.25 MG/1
6.25 TABLET ORAL 2 TIMES DAILY WITH MEALS
Qty: 60 TABLET | Refills: 5 | Status: SHIPPED | OUTPATIENT
Start: 2018-06-18 | End: 2018-11-26 | Stop reason: SDUPTHER

## 2018-06-18 NOTE — PROGRESS NOTES
Subjective   Mariusz Yepez is a 42 y.o. female    Chief Complaint   Patient presents with   • Follow-up   • Hypertension     Discuss starting a new BP med. Insurance is no longer covering the Bystolic after July 1st. But she has enough to get her through July.      History of Present Illness     HTN - BP is well controlled on Bystolic 10mg daily.  States that she was previously on Metoprolol and had terrible fatigue, to the point that she could not function.  She was changed to Bystolic several years ago and this has controlled her BP and palps w/o SE.  She is allergic to Norvasc as well.  She recently received a letter from her insurance co stating that they will not cover her bystolic any longer after 7/1/18.  She has enough bystolic to get her through the end of July.      The following portions of the patient's history were reviewed and updated as appropriate: allergies, current medications, past family history, past medical history, past social history, past surgical history and problem list.    Current Outpatient Prescriptions:   •  albuterol (PROVENTIL HFA;VENTOLIN HFA) 108 (90 Base) MCG/ACT inhaler, Inhale 2 puffs Every 4 (Four) Hours As Needed for Wheezing., Disp: 1 inhaler, Rfl: 2  •  azelastine (ASTELIN) 0.1 % nasal spray, 2 sprays into each nostril 2 (two) times a day. Use in each nostril as directed, Disp: 1 each, Rfl: 12  •  BYSTOLIC 10 MG tablet, take 1 tablet by mouth once daily, Disp: 30 tablet, Rfl: 5  •  Cholecalciferol (VITAMIN D3) 2000 UNITS tablet, Take  by mouth., Disp: , Rfl:   •  colestipol (COLESTID) 1 g tablet, take 2 tablets by mouth twice a day, Disp: 120 tablet, Rfl: 0  •  cyanocobalamin 1000 MCG/ML injection, Inject 1 mL every 2 weeks, Disp: 30 mL, Rfl: 5  •  dicyclomine (BENTYL) 10 MG capsule, take 1 capsule by mouth three times a day if needed, Disp: , Rfl: 0  •  ferrous sulfate 325 (65 FE) MG tablet, take 1 tablet by mouth twice a day with meals, Disp: 60 tablet, Rfl: 4  •   "fexofenadine (ALLEGRA ALLERGY) 180 MG tablet, Take 1 tablet by mouth daily., Disp: 30 tablet, Rfl: 5  •  fluticasone (FLONASE) 50 MCG/ACT nasal spray, inhale 2 sprays into each nostril once daily, Disp: 16 g, Rfl: 11  •  hydrochlorothiazide (HYDRODIURIL) 12.5 MG tablet, take 1 tablet by mouth once daily, Disp: 30 tablet, Rfl: 5  •  LO LOESTRIN FE 1 MG-10 MCG / 10 MCG tablet, 1 po qd, Disp: , Rfl: 1  •  Melatonin 3 MG tablet, Take  by mouth Every Night., Disp: , Rfl:   •  metFORMIN ER (GLUCOPHAGE-XR) 500 MG 24 hr tablet, Take 1 tablet by mouth 2 (Two) Times a Day., Disp: 60 tablet, Rfl: 5  •  montelukast (SINGULAIR) 10 MG tablet, take 1 tablet by mouth at bedtime, Disp: 30 tablet, Rfl: 5  •  Omega-3 Fatty Acids (FISH OIL) 1200 MG capsule capsule, Take 1,200 mg by mouth Daily., Disp: , Rfl:   •  omeprazole (priLOSEC) 40 MG capsule, take 1 capsule by mouth twice a day, Disp: 60 capsule, Rfl: 5  •  Syringe/Needle, Disp, (BD LUER-MERON SYRINGE) 25G X 5/8\" 1 ML misc, Use for B12 injections once a week x 1 month, then once a month, Disp: 6 each, Rfl: 3     Review of Systems   Constitutional: Negative for chills, fatigue and fever.   Respiratory: Negative for cough, chest tightness and shortness of breath.    Cardiovascular: Negative for chest pain.   Gastrointestinal: Negative for abdominal pain, diarrhea, nausea and vomiting.   Endocrine: Negative for cold intolerance and heat intolerance.   Musculoskeletal: Negative for arthralgias.   Neurological: Negative for dizziness.       Objective   Physical Exam   Constitutional: She is oriented to person, place, and time. She appears well-developed and well-nourished.   HENT:   Head: Normocephalic and atraumatic.   Eyes: Conjunctivae and EOM are normal. Pupils are equal, round, and reactive to light.   Neck: Normal range of motion.   Cardiovascular: Normal rate, regular rhythm and normal heart sounds.    Pulmonary/Chest: Effort normal and breath sounds normal.   Abdominal: Soft. " "Bowel sounds are normal.   Musculoskeletal: Normal range of motion.   Neurological: She is alert and oriented to person, place, and time. She has normal reflexes.   Skin: Skin is warm and dry.   Psychiatric: She has a normal mood and affect. Her behavior is normal. Judgment and thought content normal.     Vitals:    06/18/18 1014   BP: 110/70   Resp: 16   Temp: 97.4 °F (36.3 °C)   TempSrc: Temporal Artery    Weight: 94.7 kg (208 lb 12.8 oz)   Height: 169 cm (66.54\")         Assessment/Plan   Mariusz was seen today for follow-up and hypertension.    Diagnoses and all orders for this visit:    Benign essential hypertension    Other orders  -     carvedilol (COREG) 6.25 MG tablet; Take 1 tablet by mouth 2 (Two) Times a Day With Meals.  -     triamcinolone (KENALOG) 0.1 % cream; Apply  topically 2 (Two) Times a Day.      We will try Mariusz on Coreg 6.25mg BID, she will start this after she returns from vacation next week  She has an upcoming appt on 7/26/18 and we will reassess at this visit.  RTC or call sooner with any problems           "

## 2018-06-20 NOTE — TELEPHONE ENCOUNTER
If she is still having the stomach pain and issues should she go ahead with a referral to a surgeon of come in and see you?   No

## 2018-07-23 DIAGNOSIS — K21.9 GASTROESOPHAGEAL REFLUX DISEASE, ESOPHAGITIS PRESENCE NOT SPECIFIED: ICD-10-CM

## 2018-07-23 DIAGNOSIS — E55.9 VITAMIN D DEFICIENCY: ICD-10-CM

## 2018-07-23 DIAGNOSIS — E28.2 POLYCYSTIC OVARIES: ICD-10-CM

## 2018-07-23 DIAGNOSIS — I10 BENIGN ESSENTIAL HYPERTENSION: Primary | ICD-10-CM

## 2018-07-23 DIAGNOSIS — R73.09 ELEVATED GLUCOSE: ICD-10-CM

## 2018-07-23 DIAGNOSIS — E04.1 THYROID NODULE: ICD-10-CM

## 2018-07-24 ENCOUNTER — CLINICAL SUPPORT (OUTPATIENT)
Dept: INTERNAL MEDICINE | Facility: CLINIC | Age: 43
End: 2018-07-24

## 2018-07-24 DIAGNOSIS — R73.09 ELEVATED GLUCOSE: ICD-10-CM

## 2018-07-24 DIAGNOSIS — E04.1 THYROID NODULE: ICD-10-CM

## 2018-07-24 DIAGNOSIS — I10 BENIGN ESSENTIAL HYPERTENSION: ICD-10-CM

## 2018-07-24 DIAGNOSIS — E55.9 VITAMIN D DEFICIENCY: ICD-10-CM

## 2018-07-24 DIAGNOSIS — E28.2 POLYCYSTIC OVARIES: ICD-10-CM

## 2018-07-24 DIAGNOSIS — K21.9 GASTROESOPHAGEAL REFLUX DISEASE, ESOPHAGITIS PRESENCE NOT SPECIFIED: ICD-10-CM

## 2018-07-24 LAB
25(OH)D3 SERPL-MCNC: 35.9 NG/ML
ALBUMIN SERPL-MCNC: 4.13 G/DL (ref 3.2–4.8)
ALBUMIN/GLOB SERPL: 1.5 G/DL (ref 1.5–2.5)
ALP SERPL-CCNC: 44 U/L (ref 25–100)
ALT SERPL W P-5'-P-CCNC: 22 U/L (ref 7–40)
ANION GAP SERPL CALCULATED.3IONS-SCNC: 9 MMOL/L (ref 3–11)
ARTICHOKE IGE QN: 112 MG/DL (ref 0–130)
AST SERPL-CCNC: 20 U/L (ref 0–33)
BASOPHILS # BLD AUTO: 0.04 10*3/MM3 (ref 0–0.2)
BASOPHILS NFR BLD AUTO: 0.5 % (ref 0–1)
BILIRUB SERPL-MCNC: 0.6 MG/DL (ref 0.3–1.2)
BUN BLD-MCNC: 12 MG/DL (ref 9–23)
BUN/CREAT SERPL: 18.2 (ref 7–25)
CALCIUM SPEC-SCNC: 9.4 MG/DL (ref 8.7–10.4)
CHLORIDE SERPL-SCNC: 107 MMOL/L (ref 99–109)
CHOLEST SERPL-MCNC: 197 MG/DL (ref 0–200)
CO2 SERPL-SCNC: 26 MMOL/L (ref 20–31)
CREAT BLD-MCNC: 0.66 MG/DL (ref 0.6–1.3)
DEPRECATED RDW RBC AUTO: 43.1 FL (ref 37–54)
EOSINOPHIL # BLD AUTO: 0.13 10*3/MM3 (ref 0–0.3)
EOSINOPHIL NFR BLD AUTO: 1.6 % (ref 0–3)
ERYTHROCYTE [DISTWIDTH] IN BLOOD BY AUTOMATED COUNT: 12.5 % (ref 11.3–14.5)
GFR SERPL CREATININE-BSD FRML MDRD: 120 ML/MIN/1.73
GLOBULIN UR ELPH-MCNC: 2.8 GM/DL
GLUCOSE BLD-MCNC: 82 MG/DL (ref 70–100)
HBA1C MFR BLD: 4.7 % (ref 4.8–5.6)
HCT VFR BLD AUTO: 40.4 % (ref 34.5–44)
HDLC SERPL-MCNC: 68 MG/DL (ref 40–60)
HGB BLD-MCNC: 13.2 G/DL (ref 11.5–15.5)
IMM GRANULOCYTES # BLD: 0.02 10*3/MM3 (ref 0–0.03)
IMM GRANULOCYTES NFR BLD: 0.2 % (ref 0–0.6)
LYMPHOCYTES # BLD AUTO: 3.41 10*3/MM3 (ref 0.6–4.8)
LYMPHOCYTES NFR BLD AUTO: 41.6 % (ref 24–44)
MCH RBC QN AUTO: 31.1 PG (ref 27–31)
MCHC RBC AUTO-ENTMCNC: 32.7 G/DL (ref 32–36)
MCV RBC AUTO: 95.1 FL (ref 80–99)
MONOCYTES # BLD AUTO: 0.5 10*3/MM3 (ref 0–1)
MONOCYTES NFR BLD AUTO: 6.1 % (ref 0–12)
NEUTROPHILS # BLD AUTO: 4.11 10*3/MM3 (ref 1.5–8.3)
NEUTROPHILS NFR BLD AUTO: 50.2 % (ref 41–71)
PLATELET # BLD AUTO: 283 10*3/MM3 (ref 150–450)
PMV BLD AUTO: 10.5 FL (ref 6–12)
POTASSIUM BLD-SCNC: 3.7 MMOL/L (ref 3.5–5.5)
PROT SERPL-MCNC: 6.9 G/DL (ref 5.7–8.2)
RBC # BLD AUTO: 4.25 10*6/MM3 (ref 3.89–5.14)
SODIUM BLD-SCNC: 142 MMOL/L (ref 132–146)
TRIGL SERPL-MCNC: 127 MG/DL (ref 0–150)
TSH SERPL DL<=0.05 MIU/L-ACNC: 1.67 MIU/ML (ref 0.35–5.35)
VIT B12 BLD-MCNC: 352 PG/ML (ref 211–911)
WBC NRBC COR # BLD: 8.19 10*3/MM3 (ref 3.5–10.8)

## 2018-07-24 PROCEDURE — 82607 VITAMIN B-12: CPT | Performed by: NURSE PRACTITIONER

## 2018-07-24 PROCEDURE — 85025 COMPLETE CBC W/AUTO DIFF WBC: CPT | Performed by: NURSE PRACTITIONER

## 2018-07-24 PROCEDURE — 82306 VITAMIN D 25 HYDROXY: CPT | Performed by: NURSE PRACTITIONER

## 2018-07-24 PROCEDURE — 80053 COMPREHEN METABOLIC PANEL: CPT | Performed by: NURSE PRACTITIONER

## 2018-07-24 PROCEDURE — 83036 HEMOGLOBIN GLYCOSYLATED A1C: CPT | Performed by: NURSE PRACTITIONER

## 2018-07-24 PROCEDURE — 80061 LIPID PANEL: CPT | Performed by: NURSE PRACTITIONER

## 2018-07-24 PROCEDURE — 84443 ASSAY THYROID STIM HORMONE: CPT | Performed by: NURSE PRACTITIONER

## 2018-07-26 ENCOUNTER — OFFICE VISIT (OUTPATIENT)
Dept: INTERNAL MEDICINE | Facility: CLINIC | Age: 43
End: 2018-07-26

## 2018-07-26 VITALS
SYSTOLIC BLOOD PRESSURE: 110 MMHG | BODY MASS INDEX: 33.12 KG/M2 | TEMPERATURE: 98.1 F | DIASTOLIC BLOOD PRESSURE: 84 MMHG | HEIGHT: 67 IN | WEIGHT: 211 LBS | RESPIRATION RATE: 16 BRPM

## 2018-07-26 DIAGNOSIS — I10 BENIGN ESSENTIAL HYPERTENSION: ICD-10-CM

## 2018-07-26 DIAGNOSIS — Z00.00 ROUTINE GENERAL MEDICAL EXAMINATION AT A HEALTH CARE FACILITY: Primary | ICD-10-CM

## 2018-07-26 DIAGNOSIS — E55.9 VITAMIN D DEFICIENCY: ICD-10-CM

## 2018-07-26 DIAGNOSIS — K21.9 GASTROESOPHAGEAL REFLUX DISEASE, ESOPHAGITIS PRESENCE NOT SPECIFIED: ICD-10-CM

## 2018-07-26 PROCEDURE — 99396 PREV VISIT EST AGE 40-64: CPT | Performed by: NURSE PRACTITIONER

## 2018-07-28 DIAGNOSIS — K91.5 POST-CHOLECYSTECTOMY SYNDROME: ICD-10-CM

## 2018-07-28 DIAGNOSIS — R19.7 DIARRHEA, UNSPECIFIED TYPE: ICD-10-CM

## 2018-07-30 RX ORDER — MONTELUKAST SODIUM 4 MG/1
TABLET, CHEWABLE ORAL
Qty: 120 TABLET | Refills: 0 | Status: SHIPPED | OUTPATIENT
Start: 2018-07-30 | End: 2018-08-28 | Stop reason: SDUPTHER

## 2018-08-28 ENCOUNTER — TELEPHONE (OUTPATIENT)
Dept: INTERNAL MEDICINE | Facility: CLINIC | Age: 43
End: 2018-08-28

## 2018-08-28 DIAGNOSIS — R19.7 DIARRHEA, UNSPECIFIED TYPE: ICD-10-CM

## 2018-08-28 DIAGNOSIS — K91.5 POST-CHOLECYSTECTOMY SYNDROME: ICD-10-CM

## 2018-08-28 RX ORDER — MONTELUKAST SODIUM 4 MG/1
2 TABLET, CHEWABLE ORAL 2 TIMES DAILY
Qty: 360 TABLET | Refills: 1 | Status: SHIPPED | OUTPATIENT
Start: 2018-08-28 | End: 2019-02-13 | Stop reason: SDUPTHER

## 2018-09-17 RX ORDER — METFORMIN HYDROCHLORIDE 500 MG/1
TABLET, EXTENDED RELEASE ORAL
Qty: 60 TABLET | Refills: 5 | Status: SHIPPED | OUTPATIENT
Start: 2018-09-17 | End: 2019-03-15 | Stop reason: SDUPTHER

## 2018-09-24 RX ORDER — FERROUS SULFATE 325(65) MG
TABLET ORAL
Qty: 60 TABLET | Refills: 4 | Status: SHIPPED | OUTPATIENT
Start: 2018-09-24 | End: 2019-02-15 | Stop reason: SDUPTHER

## 2018-10-19 RX ORDER — OMEPRAZOLE 40 MG/1
CAPSULE, DELAYED RELEASE ORAL
Qty: 60 CAPSULE | Refills: 5 | Status: SHIPPED | OUTPATIENT
Start: 2018-10-19 | End: 2020-01-31 | Stop reason: SDUPTHER

## 2018-11-06 ENCOUNTER — TRANSCRIBE ORDERS (OUTPATIENT)
Dept: ADMINISTRATIVE | Facility: HOSPITAL | Age: 43
End: 2018-11-06

## 2018-11-06 DIAGNOSIS — Z12.31 VISIT FOR SCREENING MAMMOGRAM: Primary | ICD-10-CM

## 2018-11-20 DIAGNOSIS — I10 BENIGN ESSENTIAL HYPERTENSION: ICD-10-CM

## 2018-11-20 DIAGNOSIS — J30.9 ALLERGIC RHINITIS: ICD-10-CM

## 2018-11-20 RX ORDER — MONTELUKAST SODIUM 10 MG/1
TABLET ORAL
Qty: 30 TABLET | Refills: 5 | Status: SHIPPED | OUTPATIENT
Start: 2018-11-20 | End: 2020-01-31 | Stop reason: SDUPTHER

## 2018-11-20 RX ORDER — HYDROCHLOROTHIAZIDE 12.5 MG/1
TABLET ORAL
Qty: 30 TABLET | Refills: 5 | Status: SHIPPED | OUTPATIENT
Start: 2018-11-20 | End: 2019-05-13 | Stop reason: SDUPTHER

## 2018-11-27 RX ORDER — CARVEDILOL 6.25 MG/1
TABLET ORAL
Qty: 60 TABLET | Refills: 1 | Status: SHIPPED | OUTPATIENT
Start: 2018-11-27 | End: 2019-02-15 | Stop reason: SDUPTHER

## 2018-11-29 ENCOUNTER — OFFICE VISIT (OUTPATIENT)
Dept: INTERNAL MEDICINE | Facility: CLINIC | Age: 43
End: 2018-11-29

## 2018-11-29 VITALS
WEIGHT: 217.2 LBS | HEIGHT: 67 IN | HEART RATE: 91 BPM | BODY MASS INDEX: 34.09 KG/M2 | DIASTOLIC BLOOD PRESSURE: 100 MMHG | SYSTOLIC BLOOD PRESSURE: 138 MMHG | OXYGEN SATURATION: 99 % | RESPIRATION RATE: 16 BRPM | TEMPERATURE: 97.3 F

## 2018-11-29 DIAGNOSIS — R05.9 COUGH: ICD-10-CM

## 2018-11-29 DIAGNOSIS — J30.9 ALLERGIC RHINITIS: ICD-10-CM

## 2018-11-29 DIAGNOSIS — J20.9 ACUTE BRONCHITIS, UNSPECIFIED ORGANISM: Primary | ICD-10-CM

## 2018-11-29 PROCEDURE — 99214 OFFICE O/P EST MOD 30 MIN: CPT | Performed by: NURSE PRACTITIONER

## 2018-11-29 RX ORDER — BROMPHENIRAMINE MALEATE, PSEUDOEPHEDRINE HYDROCHLORIDE, AND DEXTROMETHORPHAN HYDROBROMIDE 2; 30; 10 MG/5ML; MG/5ML; MG/5ML
10 SYRUP ORAL 4 TIMES DAILY PRN
Qty: 240 ML | Refills: 0 | Status: SHIPPED | OUTPATIENT
Start: 2018-11-29 | End: 2018-12-11

## 2018-11-29 RX ORDER — AMOXICILLIN AND CLAVULANATE POTASSIUM 875; 125 MG/1; MG/1
1 TABLET, FILM COATED ORAL 2 TIMES DAILY
Qty: 20 TABLET | Refills: 0 | Status: SHIPPED | OUTPATIENT
Start: 2018-11-29 | End: 2018-12-11

## 2018-11-29 RX ORDER — AZELASTINE 1 MG/ML
2 SPRAY, METERED NASAL 2 TIMES DAILY
Qty: 1 EACH | Refills: 12 | Status: SHIPPED | OUTPATIENT
Start: 2018-11-29 | End: 2020-01-31 | Stop reason: SDUPTHER

## 2018-11-29 NOTE — PROGRESS NOTES
Subjective   Mariusz Yepez is a 43 y.o. female    Chief Complaint   Patient presents with   • Cough   • URI     URI    This is a new problem. Episode onset: 2 weeks ago. The problem has been gradually worsening. There has been no fever. Associated symptoms include congestion, coughing, headaches and a plugged ear sensation. Pertinent negatives include no abdominal pain, chest pain, diarrhea, dysuria, ear pain, joint pain, joint swelling, nausea, neck pain, rash, rhinorrhea, sinus pain, sneezing, sore throat, swollen glands, vomiting or wheezing. She has tried inhaler use for the symptoms. The treatment provided no relief.   Cough   This is a new problem. Episode onset: 2 weeks ago. The problem has been gradually worsening. The problem occurs every few minutes. The cough is productive of purulent sputum. Associated symptoms include headaches, nasal congestion and postnasal drip. Pertinent negatives include no chest pain, chills, ear congestion, ear pain, fever, heartburn, hemoptysis, myalgias, rash, rhinorrhea, sore throat, shortness of breath, sweats, weight loss or wheezing. Nothing aggravates the symptoms. She has tried a beta-agonist inhaler for the symptoms. The treatment provided no relief. Her past medical history is significant for asthma and environmental allergies. There is no history of bronchiectasis, bronchitis, COPD, emphysema or pneumonia.        The following portions of the patient's history were reviewed and updated as appropriate: allergies, current medications, past family history, past medical history, past social history, past surgical history and problem list.    Current Outpatient Medications:   •  albuterol (PROVENTIL HFA;VENTOLIN HFA) 108 (90 Base) MCG/ACT inhaler, Inhale 2 puffs Every 4 (Four) Hours As Needed for Wheezing., Disp: 1 inhaler, Rfl: 2  •  azelastine (ASTELIN) 0.1 % nasal spray, 2 sprays into the nostril(s) as directed by provider 2 (Two) Times a Day. Use in each  nostril as directed, Disp: 1 each, Rfl: 12  •  carvedilol (COREG) 6.25 MG tablet, take 1 tablet by mouth twice a day with meals, Disp: 60 tablet, Rfl: 1  •  Cholecalciferol (VITAMIN D3) 2000 UNITS tablet, Take  by mouth., Disp: , Rfl:   •  colestipol (COLESTID) 1 g tablet, Take 2 tablets by mouth 2 (Two) Times a Day., Disp: 360 tablet, Rfl: 1  •  ferrous sulfate 325 (65 FE) MG tablet, take 1 tablet by mouth twice a day with meals, Disp: 60 tablet, Rfl: 4  •  fexofenadine (ALLEGRA ALLERGY) 180 MG tablet, Take 1 tablet by mouth daily., Disp: 30 tablet, Rfl: 5  •  fluticasone (FLONASE) 50 MCG/ACT nasal spray, inhale 2 sprays into each nostril once daily, Disp: 16 g, Rfl: 11  •  hydrochlorothiazide (HYDRODIURIL) 12.5 MG tablet, take 1 tablet by mouth once daily, Disp: 30 tablet, Rfl: 5  •  LO LOESTRIN FE 1 MG-10 MCG / 10 MCG tablet, 1 po qd, Disp: , Rfl: 1  •  Melatonin 3 MG tablet, Take  by mouth Every Night., Disp: , Rfl:   •  metFORMIN ER (GLUCOPHAGE-XR) 500 MG 24 hr tablet, take 1 tablet by mouth twice a day, Disp: 60 tablet, Rfl: 5  •  montelukast (SINGULAIR) 10 MG tablet, take 1 tablet by mouth at bedtime, Disp: 30 tablet, Rfl: 5  •  Omega-3 Fatty Acids (FISH OIL) 1200 MG capsule capsule, Take 1,200 mg by mouth Daily., Disp: , Rfl:   •  omeprazole (priLOSEC) 40 MG capsule, take 1 capsule by mouth twice a day, Disp: 60 capsule, Rfl: 5  •  triamcinolone (KENALOG) 0.1 % cream, Apply  topically 2 (Two) Times a Day., Disp: 45 g, Rfl: 2  •  amoxicillin-clavulanate (AUGMENTIN) 875-125 MG per tablet, Take 1 tablet by mouth 2 (Two) Times a Day., Disp: 20 tablet, Rfl: 0  •  brompheniramine-pseudoephedrine-DM 30-2-10 MG/5ML syrup, Take 10 mL by mouth 4 (Four) Times a Day As Needed for Congestion or Cough., Disp: 240 mL, Rfl: 0     Review of Systems   Constitutional: Negative for chills, fatigue, fever and weight loss.   HENT: Positive for congestion and postnasal drip. Negative for ear pain, rhinorrhea, sinus pain, sneezing  "and sore throat.    Respiratory: Positive for cough. Negative for hemoptysis, chest tightness, shortness of breath and wheezing.    Cardiovascular: Negative for chest pain.   Gastrointestinal: Negative for abdominal pain, diarrhea, heartburn, nausea and vomiting.   Endocrine: Negative for cold intolerance and heat intolerance.   Genitourinary: Negative for dysuria.   Musculoskeletal: Negative for arthralgias, joint pain, myalgias and neck pain.   Skin: Negative for rash.   Allergic/Immunologic: Positive for environmental allergies.   Neurological: Positive for headaches. Negative for dizziness.       Objective   Physical Exam   Constitutional: She is oriented to person, place, and time. She appears well-developed and well-nourished.   HENT:   Head: Normocephalic and atraumatic.   Right Ear: A middle ear effusion is present.   Left Ear: A middle ear effusion is present.   Nose: Right sinus exhibits maxillary sinus tenderness and frontal sinus tenderness. Left sinus exhibits maxillary sinus tenderness and frontal sinus tenderness.   Mouth/Throat: Posterior oropharyngeal erythema present.   Eyes: Conjunctivae and EOM are normal. Pupils are equal, round, and reactive to light.   Neck: Normal range of motion.   Cardiovascular: Normal rate, regular rhythm and normal heart sounds.   Pulmonary/Chest: Effort normal. No stridor. She has decreased breath sounds. She has no wheezes. She has no rhonchi. She has no rales.   Abdominal: Soft. Bowel sounds are normal.   Musculoskeletal: Normal range of motion.   Neurological: She is alert and oriented to person, place, and time. She has normal reflexes.   Skin: Skin is warm and dry.   Psychiatric: She has a normal mood and affect. Her behavior is normal. Judgment and thought content normal.     Vitals:    11/29/18 1540   BP: 138/100   Pulse: 91   Resp: 16   Temp: 97.3 °F (36.3 °C)   TempSrc: Temporal   SpO2: 99%   Weight: 98.5 kg (217 lb 3.2 oz)   Height: 169 cm (66.54\") "         Assessment/Plan   Mariusz was seen today for cough and uri.    Diagnoses and all orders for this visit:    Acute bronchitis, unspecified organism  -     amoxicillin-clavulanate (AUGMENTIN) 875-125 MG per tablet; Take 1 tablet by mouth 2 (Two) Times a Day.    Cough  -     amoxicillin-clavulanate (AUGMENTIN) 875-125 MG per tablet; Take 1 tablet by mouth 2 (Two) Times a Day.  -     brompheniramine-pseudoephedrine-DM 30-2-10 MG/5ML syrup; Take 10 mL by mouth 4 (Four) Times a Day As Needed for Congestion or Cough.    Allergic rhinitis  -     azelastine (ASTELIN) 0.1 % nasal spray; 2 sprays into the nostril(s) as directed by provider 2 (Two) Times a Day. Use in each nostril as directed      Meds as directed  Increase fluids  RTC if sx's worsen or do not improve

## 2018-12-07 ENCOUNTER — TELEPHONE (OUTPATIENT)
Dept: INTERNAL MEDICINE | Facility: CLINIC | Age: 43
End: 2018-12-07

## 2018-12-07 RX ORDER — BENZONATATE 100 MG/1
CAPSULE ORAL
Qty: 90 CAPSULE | Refills: 1 | Status: SHIPPED | OUTPATIENT
Start: 2018-12-07 | End: 2019-01-10

## 2018-12-11 ENCOUNTER — OFFICE VISIT (OUTPATIENT)
Dept: INTERNAL MEDICINE | Facility: CLINIC | Age: 43
End: 2018-12-11

## 2018-12-11 VITALS
RESPIRATION RATE: 16 BRPM | OXYGEN SATURATION: 99 % | HEART RATE: 92 BPM | HEIGHT: 67 IN | TEMPERATURE: 97.3 F | WEIGHT: 216.6 LBS | BODY MASS INDEX: 34 KG/M2 | DIASTOLIC BLOOD PRESSURE: 80 MMHG | SYSTOLIC BLOOD PRESSURE: 120 MMHG

## 2018-12-11 DIAGNOSIS — R09.82 PND (POST-NASAL DRIP): ICD-10-CM

## 2018-12-11 DIAGNOSIS — J20.9 ACUTE BRONCHITIS, UNSPECIFIED ORGANISM: ICD-10-CM

## 2018-12-11 DIAGNOSIS — R05.9 COUGH: ICD-10-CM

## 2018-12-11 DIAGNOSIS — J01.90 ACUTE SINUSITIS, RECURRENCE NOT SPECIFIED, UNSPECIFIED LOCATION: Primary | ICD-10-CM

## 2018-12-11 PROCEDURE — 99214 OFFICE O/P EST MOD 30 MIN: CPT | Performed by: NURSE PRACTITIONER

## 2018-12-11 PROCEDURE — 96372 THER/PROPH/DIAG INJ SC/IM: CPT | Performed by: NURSE PRACTITIONER

## 2018-12-11 PROCEDURE — 94640 AIRWAY INHALATION TREATMENT: CPT | Performed by: NURSE PRACTITIONER

## 2018-12-11 RX ORDER — BECLOMETHASONE DIPROPIONATE HFA 80 UG/1
1 AEROSOL, METERED RESPIRATORY (INHALATION) 2 TIMES DAILY
COMMUNITY
Start: 2018-12-04 | End: 2020-04-21

## 2018-12-11 RX ORDER — ALBUTEROL SULFATE 2.5 MG/3ML
2.5 SOLUTION RESPIRATORY (INHALATION) ONCE
Status: COMPLETED | OUTPATIENT
Start: 2018-12-11 | End: 2018-12-11

## 2018-12-11 RX ORDER — DOXYCYCLINE HYCLATE 100 MG/1
100 CAPSULE ORAL 2 TIMES DAILY
Qty: 20 CAPSULE | Refills: 0 | Status: SHIPPED | OUTPATIENT
Start: 2018-12-11 | End: 2018-12-21

## 2018-12-11 RX ORDER — METHYLPREDNISOLONE 4 MG/1
TABLET ORAL
Qty: 21 EACH | Refills: 0 | Status: SHIPPED | OUTPATIENT
Start: 2018-12-11 | End: 2019-01-10

## 2018-12-11 RX ORDER — LEVOCETIRIZINE DIHYDROCHLORIDE 5 MG/1
5 TABLET, FILM COATED ORAL EVERY EVENING
Qty: 30 TABLET | Refills: 5 | Status: SHIPPED | OUTPATIENT
Start: 2018-12-11 | End: 2019-06-04 | Stop reason: SDUPTHER

## 2018-12-11 RX ORDER — DEXAMETHASONE SODIUM PHOSPHATE 4 MG/ML
8 INJECTION, SOLUTION INTRA-ARTICULAR; INTRALESIONAL; INTRAMUSCULAR; INTRAVENOUS; SOFT TISSUE ONCE
Status: COMPLETED | OUTPATIENT
Start: 2018-12-11 | End: 2018-12-11

## 2018-12-11 RX ORDER — CEFTRIAXONE 1 G/1
1 INJECTION, POWDER, FOR SOLUTION INTRAMUSCULAR; INTRAVENOUS ONCE
Status: COMPLETED | OUTPATIENT
Start: 2018-12-11 | End: 2018-12-11

## 2018-12-11 RX ADMIN — CEFTRIAXONE 1 G: 1 INJECTION, POWDER, FOR SOLUTION INTRAMUSCULAR; INTRAVENOUS at 11:12

## 2018-12-11 RX ADMIN — ALBUTEROL SULFATE 2.5 MG: 2.5 SOLUTION RESPIRATORY (INHALATION) at 11:13

## 2018-12-11 RX ADMIN — DEXAMETHASONE SODIUM PHOSPHATE 8 MG: 4 INJECTION, SOLUTION INTRA-ARTICULAR; INTRALESIONAL; INTRAMUSCULAR; INTRAVENOUS; SOFT TISSUE at 11:12

## 2018-12-11 NOTE — PROGRESS NOTES
Subjective   Mariusz Yepez is a 43 y.o. female    Chief Complaint   Patient presents with   • URI     still having a lot of coughing, wheezing, hard to breath (has been trying to use her inhalers more), headache, sinus pressure and pain. Took 5 days of Prednisone (only worked for about 24 hours), completed the Augmentin. Tessalon perles did not help as much has the syrup.      URI    This is a recurrent problem. Episode onset: 2 weeks. The problem has been unchanged. There has been no fever. Associated symptoms include congestion, coughing, headaches, a plugged ear sensation, sinus pain and wheezing. Pertinent negatives include no abdominal pain, chest pain, diarrhea, dysuria, ear pain, joint pain, joint swelling, nausea, neck pain, rash, rhinorrhea, sneezing, sore throat, swollen glands or vomiting. Treatments tried: Augmentin, Steroids, Tessalon Perles and Bromfed DM. The treatment provided mild relief.        The following portions of the patient's history were reviewed and updated as appropriate: allergies, current medications, past family history, past medical history, past social history, past surgical history and problem list.    Current Outpatient Medications:   •  albuterol (PROVENTIL HFA;VENTOLIN HFA) 108 (90 Base) MCG/ACT inhaler, Inhale 2 puffs Every 4 (Four) Hours As Needed for Wheezing., Disp: 1 inhaler, Rfl: 2  •  azelastine (ASTELIN) 0.1 % nasal spray, 2 sprays into the nostril(s) as directed by provider 2 (Two) Times a Day. Use in each nostril as directed, Disp: 1 each, Rfl: 12  •  benzonatate (TESSALON PERLES) 100 MG capsule, 1-2 PO TID PRN for cough, Disp: 90 capsule, Rfl: 1  •  carvedilol (COREG) 6.25 MG tablet, take 1 tablet by mouth twice a day with meals, Disp: 60 tablet, Rfl: 1  •  Cholecalciferol (VITAMIN D3) 2000 UNITS tablet, Take  by mouth., Disp: , Rfl:   •  colestipol (COLESTID) 1 g tablet, Take 2 tablets by mouth 2 (Two) Times a Day., Disp: 360 tablet, Rfl: 1  •  doxycycline  (VIBRAMYCIN) 100 MG capsule, Take 1 capsule by mouth 2 (Two) Times a Day for 10 days., Disp: 20 capsule, Rfl: 0  •  ferrous sulfate 325 (65 FE) MG tablet, take 1 tablet by mouth twice a day with meals, Disp: 60 tablet, Rfl: 4  •  fluticasone (FLONASE) 50 MCG/ACT nasal spray, inhale 2 sprays into each nostril once daily, Disp: 16 g, Rfl: 11  •  hydrochlorothiazide (HYDRODIURIL) 12.5 MG tablet, take 1 tablet by mouth once daily, Disp: 30 tablet, Rfl: 5  •  levocetirizine (XYZAL) 5 MG tablet, Take 1 tablet by mouth Every Evening., Disp: 30 tablet, Rfl: 5  •  LO LOESTRIN FE 1 MG-10 MCG / 10 MCG tablet, 1 po qd, Disp: , Rfl: 1  •  Melatonin 3 MG tablet, Take  by mouth Every Night., Disp: , Rfl:   •  metFORMIN ER (GLUCOPHAGE-XR) 500 MG 24 hr tablet, take 1 tablet by mouth twice a day, Disp: 60 tablet, Rfl: 5  •  MethylPREDNISolone (MEDROL, JAMIE,) 4 MG tablet, Take as directed on package instructions., Disp: 21 each, Rfl: 0  •  montelukast (SINGULAIR) 10 MG tablet, take 1 tablet by mouth at bedtime, Disp: 30 tablet, Rfl: 5  •  Omega-3 Fatty Acids (FISH OIL) 1200 MG capsule capsule, Take 1,200 mg by mouth Daily., Disp: , Rfl:   •  omeprazole (priLOSEC) 40 MG capsule, take 1 capsule by mouth twice a day, Disp: 60 capsule, Rfl: 5  •  QVAR REDIHALER 80 MCG/ACT inhaler, Inhale 1 puff 2 (Two) Times a Day., Disp: , Rfl:   •  triamcinolone (KENALOG) 0.1 % cream, Apply  topically 2 (Two) Times a Day., Disp: 45 g, Rfl: 2     Review of Systems   Constitutional: Negative for chills, fatigue and fever.   HENT: Positive for congestion and sinus pain. Negative for ear pain, rhinorrhea, sneezing and sore throat.    Respiratory: Positive for cough and wheezing. Negative for chest tightness and shortness of breath.    Cardiovascular: Negative for chest pain.   Gastrointestinal: Negative for abdominal pain, diarrhea, nausea and vomiting.   Endocrine: Negative for cold intolerance and heat intolerance.   Genitourinary: Negative for dysuria.  "  Musculoskeletal: Negative for arthralgias, joint pain and neck pain.   Skin: Negative for rash.   Neurological: Positive for headaches. Negative for dizziness.       Objective   Physical Exam   Constitutional: She is oriented to person, place, and time. She appears well-developed and well-nourished.   HENT:   Head: Normocephalic and atraumatic.   Right Ear: A middle ear effusion is present.   Left Ear: A middle ear effusion is present.   Nose: Right sinus exhibits maxillary sinus tenderness and frontal sinus tenderness. Left sinus exhibits maxillary sinus tenderness and frontal sinus tenderness.   Mouth/Throat: Posterior oropharyngeal erythema present.   Eyes: Conjunctivae and EOM are normal. Pupils are equal, round, and reactive to light.   Neck: Normal range of motion.   Cardiovascular: Normal rate, regular rhythm and normal heart sounds.   Pulmonary/Chest: Effort normal and breath sounds normal.   Abdominal: Soft. Bowel sounds are normal.   Musculoskeletal: Normal range of motion.   Neurological: She is alert and oriented to person, place, and time. She has normal reflexes.   Skin: Skin is warm and dry.   Psychiatric: She has a normal mood and affect. Her behavior is normal. Judgment and thought content normal.     Vitals:    12/11/18 1042   BP: 120/80   Pulse: 92   Resp: 16   Temp: 97.3 °F (36.3 °C)   TempSrc: Temporal   SpO2: 99%   Weight: 98.2 kg (216 lb 9.6 oz)   Height: 169 cm (66.54\")         Assessment/Plan   Mariusz was seen today for uri.    Diagnoses and all orders for this visit:    Acute sinusitis, recurrence not specified, unspecified location  -     cefTRIAXone (ROCEPHIN) injection 1 g; Inject 1 g into the appropriate muscle as directed by prescriber 1 (One) Time.  -     dexamethasone (DECADRON) injection 8 mg; Inject 2 mL into the appropriate muscle as directed by prescriber 1 (One) Time.    Acute bronchitis, unspecified organism  -     cefTRIAXone (ROCEPHIN) injection 1 g; Inject 1 g into the " appropriate muscle as directed by prescriber 1 (One) Time.  -     dexamethasone (DECADRON) injection 8 mg; Inject 2 mL into the appropriate muscle as directed by prescriber 1 (One) Time.  -     albuterol (PROVENTIL) nebulizer solution 0.083% 2.5 mg/3mL; Take 2.5 mg by nebulization 1 (One) Time.    PND (post-nasal drip)    Cough  -     dexamethasone (DECADRON) injection 8 mg; Inject 2 mL into the appropriate muscle as directed by prescriber 1 (One) Time.  -     albuterol (PROVENTIL) nebulizer solution 0.083% 2.5 mg/3mL; Take 2.5 mg by nebulization 1 (One) Time.    Other orders  -     doxycycline (VIBRAMYCIN) 100 MG capsule; Take 1 capsule by mouth 2 (Two) Times a Day for 10 days.  -     MethylPREDNISolone (MEDROL, JAMIE,) 4 MG tablet; Take as directed on package instructions.  -     levocetirizine (XYZAL) 5 MG tablet; Take 1 tablet by mouth Every Evening.      Meds as directed  Increase fluids  Albuterol neb and steroid injection given in the office  RTC if sx's worsen or do not improve

## 2018-12-28 ENCOUNTER — HOSPITAL ENCOUNTER (OUTPATIENT)
Dept: MAMMOGRAPHY | Facility: HOSPITAL | Age: 43
Discharge: HOME | End: 2018-12-28
Attending: OBSTETRICS & GYNECOLOGY | Admitting: OBSTETRICS & GYNECOLOGY

## 2018-12-28 DIAGNOSIS — Z12.31 VISIT FOR SCREENING MAMMOGRAM: ICD-10-CM

## 2018-12-28 PROCEDURE — 77063 BREAST TOMOSYNTHESIS BI: CPT | Performed by: RADIOLOGY

## 2018-12-28 PROCEDURE — 77067 SCR MAMMO BI INCL CAD: CPT | Performed by: RADIOLOGY

## 2018-12-28 PROCEDURE — 77067 SCR MAMMO BI INCL CAD: CPT

## 2018-12-28 PROCEDURE — 77063 BREAST TOMOSYNTHESIS BI: CPT

## 2019-01-10 ENCOUNTER — OFFICE VISIT (OUTPATIENT)
Dept: INTERNAL MEDICINE | Facility: CLINIC | Age: 44
End: 2019-01-10

## 2019-01-10 ENCOUNTER — HOSPITAL ENCOUNTER (OUTPATIENT)
Dept: CT IMAGING | Facility: HOSPITAL | Age: 44
Discharge: HOME | End: 2019-01-10
Admitting: NURSE PRACTITIONER

## 2019-01-10 VITALS
HEIGHT: 67 IN | TEMPERATURE: 98.6 F | DIASTOLIC BLOOD PRESSURE: 84 MMHG | BODY MASS INDEX: 34.59 KG/M2 | WEIGHT: 220.4 LBS | SYSTOLIC BLOOD PRESSURE: 122 MMHG

## 2019-01-10 DIAGNOSIS — R10.33 UMBILICAL PAIN: ICD-10-CM

## 2019-01-10 DIAGNOSIS — R10.31 RLQ ABDOMINAL PAIN: ICD-10-CM

## 2019-01-10 DIAGNOSIS — R10.31 RLQ ABDOMINAL PAIN: Primary | ICD-10-CM

## 2019-01-10 LAB
BILIRUB BLD-MCNC: NEGATIVE MG/DL
CLARITY, POC: CLEAR
COLOR UR: ABNORMAL
GLUCOSE UR STRIP-MCNC: NEGATIVE MG/DL
KETONES UR QL: NEGATIVE
LEUKOCYTE EST, POC: ABNORMAL
NITRITE UR-MCNC: NEGATIVE MG/ML
PH UR: 5 [PH] (ref 5–8)
PROT UR STRIP-MCNC: NEGATIVE MG/DL
RBC # UR STRIP: NEGATIVE /UL
SP GR UR: 1.03 (ref 1–1.03)
UROBILINOGEN UR QL: NORMAL

## 2019-01-10 PROCEDURE — 74178 CT ABD&PLV WO CNTR FLWD CNTR: CPT

## 2019-01-10 PROCEDURE — 87086 URINE CULTURE/COLONY COUNT: CPT | Performed by: NURSE PRACTITIONER

## 2019-01-10 PROCEDURE — 0 DIATRIZOATE MEGLUMINE & SODIUM PER 1 ML: Performed by: NURSE PRACTITIONER

## 2019-01-10 PROCEDURE — 81003 URINALYSIS AUTO W/O SCOPE: CPT | Performed by: NURSE PRACTITIONER

## 2019-01-10 PROCEDURE — 25010000002 IOPAMIDOL 61 % SOLUTION: Performed by: NURSE PRACTITIONER

## 2019-01-10 PROCEDURE — 99214 OFFICE O/P EST MOD 30 MIN: CPT | Performed by: NURSE PRACTITIONER

## 2019-01-10 RX ADMIN — IOPAMIDOL 95 ML: 612 INJECTION, SOLUTION INTRAVENOUS at 18:16

## 2019-01-10 RX ADMIN — Medication 15 ML: at 17:00

## 2019-01-10 NOTE — PROGRESS NOTES
Subjective   Mariusz Yepez is a 43 y.o. female    Chief Complaint   Patient presents with   • Abdominal Pain     underneath her belly button, had back in December, has it again now, sometimes hurts on the right     Abdominal Pain   This is a recurrent problem. The current episode started more than 1 month ago. The onset quality is undetermined. The problem occurs intermittently. The problem has been waxing and waning. Pain location: below her naval and RLQ. The pain is moderate. The quality of the pain is aching and dull. The abdominal pain radiates to the RLQ. Pertinent negatives include no anorexia, arthralgias, belching, constipation, diarrhea, dysuria, fever, flatus, frequency, headaches, hematochezia, hematuria, melena, myalgias, nausea, vomiting or weight loss. The pain is aggravated by certain positions, coughing, movement and palpation. The pain is relieved by nothing. She has tried nothing for the symptoms. The treatment provided no relief. There is no history of abdominal surgery, colon cancer, Crohn's disease, gallstones, GERD, irritable bowel syndrome, pancreatitis, PUD or ulcerative colitis.        The following portions of the patient's history were reviewed and updated as appropriate: allergies, current medications, past family history, past medical history, past social history, past surgical history and problem list.    Current Outpatient Medications:   •  albuterol (PROVENTIL HFA;VENTOLIN HFA) 108 (90 Base) MCG/ACT inhaler, Inhale 2 puffs Every 4 (Four) Hours As Needed for Wheezing., Disp: 1 inhaler, Rfl: 2  •  azelastine (ASTELIN) 0.1 % nasal spray, 2 sprays into the nostril(s) as directed by provider 2 (Two) Times a Day. Use in each nostril as directed, Disp: 1 each, Rfl: 12  •  carvedilol (COREG) 6.25 MG tablet, take 1 tablet by mouth twice a day with meals, Disp: 60 tablet, Rfl: 1  •  Cholecalciferol (VITAMIN D3) 2000 UNITS tablet, Take  by mouth., Disp: , Rfl:   •  colestipol  (COLESTID) 1 g tablet, Take 2 tablets by mouth 2 (Two) Times a Day., Disp: 360 tablet, Rfl: 1  •  ferrous sulfate 325 (65 FE) MG tablet, take 1 tablet by mouth twice a day with meals, Disp: 60 tablet, Rfl: 4  •  fluticasone (FLONASE) 50 MCG/ACT nasal spray, inhale 2 sprays into each nostril once daily, Disp: 16 g, Rfl: 11  •  hydrochlorothiazide (HYDRODIURIL) 12.5 MG tablet, take 1 tablet by mouth once daily, Disp: 30 tablet, Rfl: 5  •  levocetirizine (XYZAL) 5 MG tablet, Take 1 tablet by mouth Every Evening., Disp: 30 tablet, Rfl: 5  •  LO LOESTRIN FE 1 MG-10 MCG / 10 MCG tablet, 1 po qd, Disp: , Rfl: 1  •  Melatonin 3 MG tablet, Take  by mouth Every Night., Disp: , Rfl:   •  metFORMIN ER (GLUCOPHAGE-XR) 500 MG 24 hr tablet, take 1 tablet by mouth twice a day, Disp: 60 tablet, Rfl: 5  •  montelukast (SINGULAIR) 10 MG tablet, take 1 tablet by mouth at bedtime, Disp: 30 tablet, Rfl: 5  •  Omega-3 Fatty Acids (FISH OIL) 1200 MG capsule capsule, Take 1,200 mg by mouth Daily., Disp: , Rfl:   •  omeprazole (priLOSEC) 40 MG capsule, take 1 capsule by mouth twice a day, Disp: 60 capsule, Rfl: 5  •  QVAR REDIHALER 80 MCG/ACT inhaler, Inhale 1 puff 2 (Two) Times a Day., Disp: , Rfl:   •  triamcinolone (KENALOG) 0.1 % cream, Apply  topically 2 (Two) Times a Day., Disp: 45 g, Rfl: 2     Review of Systems   Constitutional: Negative for chills, fatigue, fever and weight loss.   Respiratory: Negative for cough, chest tightness and shortness of breath.    Cardiovascular: Negative for chest pain.   Gastrointestinal: Positive for abdominal pain. Negative for abdominal distention, anal bleeding, anorexia, blood in stool, constipation, diarrhea, flatus, hematochezia, melena, nausea, rectal pain and vomiting.   Endocrine: Negative for cold intolerance and heat intolerance.   Genitourinary: Negative for dysuria, frequency and hematuria.   Musculoskeletal: Negative for arthralgias and myalgias.   Neurological: Negative for dizziness and  "headaches.       Objective   Physical Exam   Constitutional: She is oriented to person, place, and time. She appears well-developed and well-nourished.   HENT:   Head: Normocephalic and atraumatic.   Eyes: Conjunctivae and EOM are normal. Pupils are equal, round, and reactive to light.   Neck: Normal range of motion.   Cardiovascular: Normal rate, regular rhythm and normal heart sounds.   Pulmonary/Chest: Effort normal and breath sounds normal.   Abdominal: Soft. Bowel sounds are normal. There is no hepatosplenomegaly. There is tenderness in the right lower quadrant and suprapubic area. There is rebound, guarding and tenderness at McBurney's point. There is no rigidity, no CVA tenderness and negative Dong's sign. No hernia.   Musculoskeletal: Normal range of motion.   Neurological: She is alert and oriented to person, place, and time. She has normal reflexes.   Skin: Skin is warm and dry.   Psychiatric: She has a normal mood and affect. Her behavior is normal. Judgment and thought content normal.     Vitals:    01/10/19 1456   BP: 122/84   BP Location: Left arm   Temp: 98.6 °F (37 °C)   TempSrc: Temporal   Weight: 100 kg (220 lb 6.4 oz)   Height: 169 cm (66.54\")         Assessment/Plan   Mariusz was seen today for abdominal pain.    Diagnoses and all orders for this visit:    RLQ abdominal pain  -     CT Abdomen Pelvis With & Without Contrast; Future  -     POC Urinalysis Dipstick, Automated  -     Urine Culture - Urine, Urine, Clean Catch    Umbilical pain  -     CT Abdomen Pelvis With & Without Contrast; Future  -     POC Urinalysis Dipstick, Automated  -     Urine Culture - Urine, Urine, Clean Catch      Sent for CT of the Abdomen/Pelvis  UA with trace Leuks, sent for culture  Will call with results  To the ER with worsening sx's           "

## 2019-01-11 ENCOUNTER — TELEPHONE (OUTPATIENT)
Dept: INTERNAL MEDICINE | Facility: CLINIC | Age: 44
End: 2019-01-11

## 2019-01-11 NOTE — TELEPHONE ENCOUNTER
PN of results.  She stated understanding.  She says GYN check this every 6 months and they seen to be shrinking

## 2019-01-11 NOTE — TELEPHONE ENCOUNTER
----- Message from DAVIN Jo sent at 1/11/2019 10:21 AM EST -----  Please let pt know that her CT did NOT show any signs of appendicitis, but did detect a fibroid uterus that appears worse on the right.  I feel this is more than likely her cause of pain and suggest that she f/u with GYN ASAP

## 2019-01-12 LAB — BACTERIA SPEC AEROBE CULT: NORMAL

## 2019-01-24 ENCOUNTER — OFFICE VISIT (OUTPATIENT)
Dept: INTERNAL MEDICINE | Facility: CLINIC | Age: 44
End: 2019-01-24

## 2019-01-24 VITALS
RESPIRATION RATE: 16 BRPM | TEMPERATURE: 97.7 F | WEIGHT: 221.6 LBS | DIASTOLIC BLOOD PRESSURE: 80 MMHG | HEART RATE: 90 BPM | BODY MASS INDEX: 34.78 KG/M2 | HEIGHT: 67 IN | OXYGEN SATURATION: 100 % | SYSTOLIC BLOOD PRESSURE: 110 MMHG

## 2019-01-24 DIAGNOSIS — D64.9 ANEMIA, UNSPECIFIED TYPE: ICD-10-CM

## 2019-01-24 DIAGNOSIS — I10 BENIGN ESSENTIAL HYPERTENSION: Primary | ICD-10-CM

## 2019-01-24 DIAGNOSIS — E53.8 B12 DEFICIENCY: ICD-10-CM

## 2019-01-24 DIAGNOSIS — E55.9 VITAMIN D DEFICIENCY: ICD-10-CM

## 2019-01-24 DIAGNOSIS — K21.9 GASTROESOPHAGEAL REFLUX DISEASE, ESOPHAGITIS PRESENCE NOT SPECIFIED: ICD-10-CM

## 2019-01-24 PROCEDURE — 99214 OFFICE O/P EST MOD 30 MIN: CPT | Performed by: NURSE PRACTITIONER

## 2019-01-24 NOTE — PROGRESS NOTES
Subjective   Mariusz Yepez is a 43 y.o. female    Chief Complaint   Patient presents with   • 6 month follow up   • Hypertension   • Heartburn   • Allergies     History of Present Illness     Lower abdominal pain has resolved.  She had a recent f/u with GYN concerning the fibroids noted on CT.  These have not changed and they do not think this was the cause of her pain.  She will f/u with GI if sx's return.      Feels that she may have a food allergy to something in [employer].  She has had allergy testing a few years ago and the only food that was + was [place].  Sx's resolved with benadryl and she does not wish to pursue further work up.    Anemic - chronic; on ferrous sulfate 325 mg daily; no longer taking B12        Allergy sx's are better. I referred her to an allergist in August 2016. She added Qvar to her Singulair, Albuterol, flonase and astelin. Really only taking the Singulair and Flonase; sx's have been really well controlled      HTN - chronic; stable on Coreg 6.25  She is tolerating well w/o SE.  BP looks great!      Reflux - chronic and stable on Prilosec daily; sx's are well controlled      GYN- Dr. Hanna; she is repeated her TV US in Sept - repeat done due to LLQ pain; had a new fibroid, but no other changes; doing Q6 months now.    mamm - 9/6/2017  last colon - 4/2017 (2 polyps)   last Pap summer 2017  DXA none  last eye exam; goes every 6 months   TdaP 8/07   Flu shot - fall 2018 @ work  Hep A - 11/2018  EGD 12/30/13 - esophagitis       The following portions of the patient's history were reviewed and updated as appropriate: allergies, current medications, past family history, past medical history, past social history, past surgical history and problem list.    Current Outpatient Medications:   •  albuterol (PROVENTIL HFA;VENTOLIN HFA) 108 (90 Base) MCG/ACT inhaler, Inhale 2 puffs Every 4 (Four) Hours As Needed for Wheezing., Disp: 1 inhaler, Rfl: 2  •  azelastine (ASTELIN) 0.1 % nasal spray, 2  sprays into the nostril(s) as directed by provider 2 (Two) Times a Day. Use in each nostril as directed, Disp: 1 each, Rfl: 12  •  carvedilol (COREG) 6.25 MG tablet, take 1 tablet by mouth twice a day with meals, Disp: 60 tablet, Rfl: 1  •  Cholecalciferol (VITAMIN D3) 2000 UNITS tablet, Take  by mouth., Disp: , Rfl:   •  colestipol (COLESTID) 1 g tablet, Take 2 tablets by mouth 2 (Two) Times a Day., Disp: 360 tablet, Rfl: 1  •  ferrous sulfate 325 (65 FE) MG tablet, take 1 tablet by mouth twice a day with meals, Disp: 60 tablet, Rfl: 4  •  fluticasone (FLONASE) 50 MCG/ACT nasal spray, inhale 2 sprays into each nostril once daily, Disp: 16 g, Rfl: 11  •  hydrochlorothiazide (HYDRODIURIL) 12.5 MG tablet, take 1 tablet by mouth once daily, Disp: 30 tablet, Rfl: 5  •  levocetirizine (XYZAL) 5 MG tablet, Take 1 tablet by mouth Every Evening., Disp: 30 tablet, Rfl: 5  •  LO LOESTRIN FE 1 MG-10 MCG / 10 MCG tablet, 1 po qd, Disp: , Rfl: 1  •  Melatonin 3 MG tablet, Take  by mouth Every Night., Disp: , Rfl:   •  metFORMIN ER (GLUCOPHAGE-XR) 500 MG 24 hr tablet, take 1 tablet by mouth twice a day, Disp: 60 tablet, Rfl: 5  •  montelukast (SINGULAIR) 10 MG tablet, take 1 tablet by mouth at bedtime, Disp: 30 tablet, Rfl: 5  •  Omega-3 Fatty Acids (FISH OIL) 1200 MG capsule capsule, Take 1,200 mg by mouth Daily., Disp: , Rfl:   •  omeprazole (priLOSEC) 40 MG capsule, take 1 capsule by mouth twice a day, Disp: 60 capsule, Rfl: 5  •  QVAR REDIHALER 80 MCG/ACT inhaler, Inhale 1 puff 2 (Two) Times a Day., Disp: , Rfl:   •  triamcinolone (KENALOG) 0.1 % cream, Apply  topically 2 (Two) Times a Day., Disp: 45 g, Rfl: 2     Review of Systems   Constitutional: Negative for chills, fatigue and fever.   Respiratory: Negative for cough, chest tightness and shortness of breath.    Cardiovascular: Negative for chest pain.   Gastrointestinal: Negative for abdominal pain, diarrhea, nausea and vomiting.   Endocrine: Negative for cold  "intolerance and heat intolerance.   Musculoskeletal: Negative for arthralgias.   Neurological: Negative for dizziness.       Objective   Physical Exam   Constitutional: She is oriented to person, place, and time. She appears well-developed and well-nourished.   HENT:   Head: Normocephalic and atraumatic.   Eyes: Conjunctivae and EOM are normal. Pupils are equal, round, and reactive to light.   Neck: Normal range of motion.   Cardiovascular: Normal rate, regular rhythm and normal heart sounds.   Pulmonary/Chest: Effort normal and breath sounds normal.   Abdominal: Soft. Bowel sounds are normal.   Musculoskeletal: Normal range of motion.   Neurological: She is alert and oriented to person, place, and time. She has normal reflexes.   Skin: Skin is warm and dry.   Psychiatric: She has a normal mood and affect. Her behavior is normal. Judgment and thought content normal.     Vitals:    01/24/19 1535   BP: 110/80   Pulse: 90   Resp: 16   Temp: 97.7 °F (36.5 °C)   TempSrc: Temporal   SpO2: 100%   Weight: 101 kg (221 lb 9.6 oz)   Height: 169 cm (66.54\")         Assessment/Plan   Mariusz was seen today for 6 month follow up, hypertension, heartburn and allergies.    Diagnoses and all orders for this visit:    Benign essential hypertension  -     CBC & Differential  -     Comprehensive Metabolic Panel  -     Lipid Panel  -     Vitamin D 25 Hydroxy  -     Vitamin B12  -     TSH    Vitamin D deficiency  -     CBC & Differential  -     Comprehensive Metabolic Panel  -     Lipid Panel  -     Vitamin D 25 Hydroxy  -     Vitamin B12  -     TSH    Anemia, unspecified type  -     CBC & Differential  -     Comprehensive Metabolic Panel  -     Lipid Panel  -     Vitamin D 25 Hydroxy  -     Vitamin B12  -     TSH    B12 deficiency  -     CBC & Differential  -     Comprehensive Metabolic Panel  -     Lipid Panel  -     Vitamin D 25 Hydroxy  -     Vitamin B12  -     TSH    Gastroesophageal reflux disease, esophagitis presence not " specified  -     CBC & Differential  -     Comprehensive Metabolic Panel  -     Lipid Panel  -     Vitamin D 25 Hydroxy  -     Vitamin B12  -     TSH      No changes made  Labs sent  Return in about 6 months (around 7/24/2019) for Annual.

## 2019-02-01 LAB
25(OH)D3 SERPL-MCNC: 35.6 NG/ML
ALBUMIN SERPL-MCNC: 4.13 G/DL (ref 3.2–4.8)
ALBUMIN/GLOB SERPL: 1.9 G/DL (ref 1.5–2.5)
ALP SERPL-CCNC: 45 U/L (ref 25–100)
ALT SERPL W P-5'-P-CCNC: 24 U/L (ref 7–40)
ANION GAP SERPL CALCULATED.3IONS-SCNC: 7 MMOL/L (ref 3–11)
ARTICHOKE IGE QN: 126 MG/DL (ref 0–130)
AST SERPL-CCNC: 20 U/L (ref 0–33)
BASOPHILS # BLD AUTO: 0.04 10*3/MM3 (ref 0–0.2)
BASOPHILS NFR BLD AUTO: 0.5 % (ref 0–1)
BILIRUB SERPL-MCNC: 0.5 MG/DL (ref 0.3–1.2)
BUN BLD-MCNC: 15 MG/DL (ref 9–23)
BUN/CREAT SERPL: 21.1 (ref 7–25)
CALCIUM SPEC-SCNC: 9.5 MG/DL (ref 8.7–10.4)
CHLORIDE SERPL-SCNC: 107 MMOL/L (ref 99–109)
CHOLEST SERPL-MCNC: 205 MG/DL (ref 0–200)
CO2 SERPL-SCNC: 28 MMOL/L (ref 20–31)
CREAT BLD-MCNC: 0.71 MG/DL (ref 0.6–1.3)
DEPRECATED RDW RBC AUTO: 44.3 FL (ref 37–54)
EOSINOPHIL # BLD AUTO: 0.24 10*3/MM3 (ref 0–0.3)
EOSINOPHIL NFR BLD AUTO: 3 % (ref 0–3)
ERYTHROCYTE [DISTWIDTH] IN BLOOD BY AUTOMATED COUNT: 12.5 % (ref 11.3–14.5)
GFR SERPL CREATININE-BSD FRML MDRD: 110 ML/MIN/1.73
GLOBULIN UR ELPH-MCNC: 2.2 GM/DL
GLUCOSE BLD-MCNC: 109 MG/DL (ref 70–100)
HCT VFR BLD AUTO: 41.2 % (ref 34.5–44)
HDLC SERPL-MCNC: 67 MG/DL (ref 40–60)
HGB BLD-MCNC: 13.4 G/DL (ref 11.5–15.5)
IMM GRANULOCYTES # BLD AUTO: 0.01 10*3/MM3 (ref 0–0.03)
IMM GRANULOCYTES NFR BLD AUTO: 0.1 % (ref 0–0.6)
LYMPHOCYTES # BLD AUTO: 3.22 10*3/MM3 (ref 0.6–4.8)
LYMPHOCYTES NFR BLD AUTO: 40.5 % (ref 24–44)
MCH RBC QN AUTO: 31.8 PG (ref 27–31)
MCHC RBC AUTO-ENTMCNC: 32.5 G/DL (ref 32–36)
MCV RBC AUTO: 97.9 FL (ref 80–99)
MONOCYTES # BLD AUTO: 0.5 10*3/MM3 (ref 0–1)
MONOCYTES NFR BLD AUTO: 6.3 % (ref 0–12)
NEUTROPHILS # BLD AUTO: 3.96 10*3/MM3 (ref 1.5–8.3)
NEUTROPHILS NFR BLD AUTO: 49.7 % (ref 41–71)
PLATELET # BLD AUTO: 291 10*3/MM3 (ref 150–450)
PMV BLD AUTO: 10.6 FL (ref 6–12)
POTASSIUM BLD-SCNC: 3.9 MMOL/L (ref 3.5–5.5)
PROT SERPL-MCNC: 6.3 G/DL (ref 5.7–8.2)
RBC # BLD AUTO: 4.21 10*6/MM3 (ref 3.89–5.14)
SODIUM BLD-SCNC: 142 MMOL/L (ref 132–146)
TRIGL SERPL-MCNC: 127 MG/DL (ref 0–150)
TSH SERPL DL<=0.05 MIU/L-ACNC: 3.29 MIU/ML (ref 0.35–5.35)
VIT B12 BLD-MCNC: 523 PG/ML (ref 211–911)
WBC NRBC COR # BLD: 7.96 10*3/MM3 (ref 3.5–10.8)

## 2019-02-01 PROCEDURE — 82306 VITAMIN D 25 HYDROXY: CPT | Performed by: NURSE PRACTITIONER

## 2019-02-01 PROCEDURE — 80061 LIPID PANEL: CPT | Performed by: NURSE PRACTITIONER

## 2019-02-01 PROCEDURE — 85025 COMPLETE CBC W/AUTO DIFF WBC: CPT | Performed by: NURSE PRACTITIONER

## 2019-02-01 PROCEDURE — 84443 ASSAY THYROID STIM HORMONE: CPT | Performed by: NURSE PRACTITIONER

## 2019-02-01 PROCEDURE — 80053 COMPREHEN METABOLIC PANEL: CPT | Performed by: NURSE PRACTITIONER

## 2019-02-01 PROCEDURE — 82607 VITAMIN B-12: CPT | Performed by: NURSE PRACTITIONER

## 2019-02-13 DIAGNOSIS — K91.5 POST-CHOLECYSTECTOMY SYNDROME: ICD-10-CM

## 2019-02-13 DIAGNOSIS — R19.7 DIARRHEA, UNSPECIFIED TYPE: ICD-10-CM

## 2019-02-13 RX ORDER — MONTELUKAST SODIUM 4 MG/1
TABLET, CHEWABLE ORAL
Qty: 360 TABLET | Refills: 4 | Status: SHIPPED | OUTPATIENT
Start: 2019-02-13 | End: 2019-05-02 | Stop reason: SDUPTHER

## 2019-02-15 RX ORDER — CARVEDILOL 6.25 MG/1
TABLET ORAL
Qty: 60 TABLET | Refills: 5 | Status: SHIPPED | OUTPATIENT
Start: 2019-02-15 | End: 2019-08-07 | Stop reason: SDUPTHER

## 2019-02-15 RX ORDER — FERROUS SULFATE 325(65) MG
TABLET ORAL
Qty: 60 TABLET | Refills: 5 | Status: SHIPPED | OUTPATIENT
Start: 2019-02-15 | End: 2019-08-07 | Stop reason: SDUPTHER

## 2019-02-18 ENCOUNTER — TELEPHONE (OUTPATIENT)
Dept: INTERNAL MEDICINE | Facility: CLINIC | Age: 44
End: 2019-02-18

## 2019-02-18 NOTE — TELEPHONE ENCOUNTER
LIDIA Lopez has APPROVED omeprazole 40mg BID.    Effective 1/19/19-2/17/2022    PA# [facility] [id number]    Auth has been faxed to [facility] at [phone]

## 2019-03-04 RX ORDER — FLUTICASONE PROPIONATE 50 MCG
2 SPRAY, SUSPENSION (ML) NASAL DAILY
Qty: 16 G | Refills: 11 | Status: SHIPPED | OUTPATIENT
Start: 2019-03-04 | End: 2019-10-21 | Stop reason: SDUPTHER

## 2019-03-15 RX ORDER — METFORMIN HYDROCHLORIDE 500 MG/1
TABLET, EXTENDED RELEASE ORAL
Qty: 60 TABLET | Refills: 5 | Status: SHIPPED | OUTPATIENT
Start: 2019-03-15 | End: 2019-09-03 | Stop reason: SDUPTHER

## 2019-03-18 ENCOUNTER — OFFICE VISIT (OUTPATIENT)
Dept: INTERNAL MEDICINE | Facility: CLINIC | Age: 44
End: 2019-03-18

## 2019-03-18 VITALS
OXYGEN SATURATION: 100 % | DIASTOLIC BLOOD PRESSURE: 80 MMHG | SYSTOLIC BLOOD PRESSURE: 128 MMHG | BODY MASS INDEX: 35.22 KG/M2 | WEIGHT: 224.4 LBS | TEMPERATURE: 97.4 F | HEIGHT: 67 IN | RESPIRATION RATE: 16 BRPM | HEART RATE: 76 BPM

## 2019-03-18 DIAGNOSIS — T75.3XXS MOTION SICKNESS, SEQUELA: ICD-10-CM

## 2019-03-18 DIAGNOSIS — L50.0 ALLERGIC URTICARIA: Primary | ICD-10-CM

## 2019-03-18 PROCEDURE — 96372 THER/PROPH/DIAG INJ SC/IM: CPT | Performed by: NURSE PRACTITIONER

## 2019-03-18 PROCEDURE — 99214 OFFICE O/P EST MOD 30 MIN: CPT | Performed by: NURSE PRACTITIONER

## 2019-03-18 RX ORDER — METHYLPREDNISOLONE 4 MG/1
TABLET ORAL
Qty: 1 EACH | Refills: 0 | Status: SHIPPED | OUTPATIENT
Start: 2019-03-18 | End: 2020-01-31

## 2019-03-18 RX ORDER — SCOLOPAMINE TRANSDERMAL SYSTEM 1 MG/1
1 PATCH, EXTENDED RELEASE TRANSDERMAL
Qty: 4 EACH | Refills: 2 | Status: SHIPPED | OUTPATIENT
Start: 2019-03-18 | End: 2020-01-31

## 2019-03-18 RX ORDER — DEXAMETHASONE SODIUM PHOSPHATE 4 MG/ML
8 INJECTION, SOLUTION INTRA-ARTICULAR; INTRALESIONAL; INTRAMUSCULAR; INTRAVENOUS; SOFT TISSUE ONCE
Status: COMPLETED | OUTPATIENT
Start: 2019-03-18 | End: 2019-03-18

## 2019-03-18 RX ADMIN — DEXAMETHASONE SODIUM PHOSPHATE 8 MG: 4 INJECTION, SOLUTION INTRA-ARTICULAR; INTRALESIONAL; INTRAMUSCULAR; INTRAVENOUS; SOFT TISSUE at 10:42

## 2019-03-18 NOTE — PROGRESS NOTES
Subjective   Mariusz Yepez is a 43 y.o. female    Chief Complaint   Patient presents with   • Allergic Reaction     Rash and itching. Allergic to rye. Went [place] tasting that had rye in it.    • motion sickness     History of Present Illness     Pt presents with concerns of an allergic reaction.      Pt has a known allergy to [place]. She went to a bourbon testing yesterday and drank bourbon with Rye.  She did not take an antihistamines because she has allergy testing in < 1 week.  She can take steroids per the allergist.  Has an itchy rash scattered diffusely on her skin.  No difficulty breathing or swallowing.      Pt requests something for motion sickness.  States that she has to travel on a bus with children soon and always has difficulty with motion sickness.      The following portions of the patient's history were reviewed and updated as appropriate: allergies, current medications, past family history, past medical history, past social history, past surgical history and problem list.    Current Outpatient Medications:   •  albuterol (PROVENTIL HFA;VENTOLIN HFA) 108 (90 Base) MCG/ACT inhaler, Inhale 2 puffs Every 4 (Four) Hours As Needed for Wheezing., Disp: 1 inhaler, Rfl: 2  •  azelastine (ASTELIN) 0.1 % nasal spray, 2 sprays into the nostril(s) as directed by provider 2 (Two) Times a Day. Use in each nostril as directed, Disp: 1 each, Rfl: 12  •  carvedilol (COREG) 6.25 MG tablet, take 1 tablet by mouth twice a day WITH MEALS, Disp: 60 tablet, Rfl: 5  •  Cholecalciferol (VITAMIN D3) 2000 UNITS tablet, Take  by mouth., Disp: , Rfl:   •  colestipol (COLESTID) 1 g tablet, take 2 tablets by mouth twice a day, Disp: 360 tablet, Rfl: 4  •  ferrous sulfate 325 (65 FE) MG tablet, take 1 tablet by mouth twice a day with meals, Disp: 60 tablet, Rfl: 5  •  fluticasone (FLONASE) 50 MCG/ACT nasal spray, 2 sprays into the nostril(s) as directed by provider Daily., Disp: 16 g, Rfl: 11  •  hydrochlorothiazide  (HYDRODIURIL) 12.5 MG tablet, take 1 tablet by mouth once daily, Disp: 30 tablet, Rfl: 5  •  levocetirizine (XYZAL) 5 MG tablet, Take 1 tablet by mouth Every Evening., Disp: 30 tablet, Rfl: 5  •  LO LOESTRIN FE 1 MG-10 MCG / 10 MCG tablet, 1 po qd, Disp: , Rfl: 1  •  Melatonin 3 MG tablet, Take  by mouth Every Night., Disp: , Rfl:   •  metFORMIN ER (GLUCOPHAGE-XR) 500 MG 24 hr tablet, take 1 tablet by mouth twice a day, Disp: 60 tablet, Rfl: 5  •  methylPREDNISolone (MEDROL, JAMIE,) 4 MG tablet, Take as directed on package instructions., Disp: 1 each, Rfl: 0  •  montelukast (SINGULAIR) 10 MG tablet, take 1 tablet by mouth at bedtime, Disp: 30 tablet, Rfl: 5  •  Omega-3 Fatty Acids (FISH OIL) 1200 MG capsule capsule, Take 1,200 mg by mouth Daily., Disp: , Rfl:   •  omeprazole (priLOSEC) 40 MG capsule, take 1 capsule by mouth twice a day, Disp: 60 capsule, Rfl: 5  •  QVAR REDIHALER 80 MCG/ACT inhaler, Inhale 1 puff 2 (Two) Times a Day., Disp: , Rfl:   •  Scopolamine (TRANSDERM-SCOP, 1.5 MG,) 1.5 MG/3DAYS patch, Place 1 patch on the skin as directed by provider Every 72 (Seventy-Two) Hours., Disp: 4 each, Rfl: 2  •  triamcinolone (KENALOG) 0.1 % cream, Apply  topically 2 (Two) Times a Day., Disp: 45 g, Rfl: 2  No current facility-administered medications for this visit.      Review of Systems   Constitutional: Negative for chills, fatigue and fever.   Respiratory: Negative for cough, choking, chest tightness, shortness of breath, wheezing and stridor.    Cardiovascular: Negative for chest pain and palpitations.   Gastrointestinal: Negative for abdominal pain, diarrhea, nausea and vomiting.   Endocrine: Negative for cold intolerance and heat intolerance.   Musculoskeletal: Negative for arthralgias.   Skin: Positive for rash.   Neurological: Negative for dizziness.       Objective   Physical Exam   Constitutional: She is oriented to person, place, and time. She appears well-developed and well-nourished.   HENT:   Head:  "Normocephalic and atraumatic.   Eyes: Conjunctivae and EOM are normal. Pupils are equal, round, and reactive to light.   Neck: Normal range of motion.   Cardiovascular: Normal rate, regular rhythm and normal heart sounds.   Pulmonary/Chest: Effort normal and breath sounds normal.   Abdominal: Soft. Bowel sounds are normal.   Musculoskeletal: Normal range of motion.   Neurological: She is alert and oriented to person, place, and time. She has normal reflexes.   Skin: Skin is warm and dry. Rash (diffusely scattered) noted. Rash is maculopapular.   Psychiatric: She has a normal mood and affect. Her behavior is normal. Judgment and thought content normal.     Vitals:    03/18/19 1014   BP: 128/80   Pulse: 76   Resp: 16   Temp: 97.4 °F (36.3 °C)   TempSrc: Temporal   SpO2: 100%   Weight: 102 kg (224 lb 6.4 oz)   Height: 169 cm (66.54\")         Assessment/Plan   Mariusz was seen today for allergic reaction and motion sickness.    Diagnoses and all orders for this visit:    Allergic urticaria  -     dexamethasone (DECADRON) injection 8 mg; Inject 2 mL into the appropriate muscle as directed by prescriber 1 (One) Time.  -     methylPREDNISolone (MEDROL, JAMIE,) 4 MG tablet; Take as directed on package instructions.    Motion sickness, sequela  -     Scopolamine (TRANSDERM-SCOP, 1.5 MG,) 1.5 MG/3DAYS patch; Place 1 patch on the skin as directed by provider Every 72 (Seventy-Two) Hours.    Steroid injection given in office  Medrol pack as directed  Transderm patch for motion sickness  RTC with any problems             "

## 2019-05-02 DIAGNOSIS — K91.5 POST-CHOLECYSTECTOMY SYNDROME: ICD-10-CM

## 2019-05-02 DIAGNOSIS — R19.7 DIARRHEA, UNSPECIFIED TYPE: ICD-10-CM

## 2019-05-03 RX ORDER — MONTELUKAST SODIUM 4 MG/1
2 TABLET, CHEWABLE ORAL 2 TIMES DAILY
Qty: 360 TABLET | Refills: 4 | Status: SHIPPED | OUTPATIENT
Start: 2019-05-03 | End: 2020-01-31 | Stop reason: SDUPTHER

## 2019-05-13 DIAGNOSIS — I10 BENIGN ESSENTIAL HYPERTENSION: ICD-10-CM

## 2019-05-13 RX ORDER — HYDROCHLOROTHIAZIDE 12.5 MG/1
TABLET ORAL
Qty: 30 TABLET | Refills: 5 | Status: SHIPPED | OUTPATIENT
Start: 2019-05-13 | End: 2019-10-29 | Stop reason: SDUPTHER

## 2019-06-04 RX ORDER — LEVOCETIRIZINE DIHYDROCHLORIDE 5 MG/1
5 TABLET, FILM COATED ORAL EVERY EVENING
Qty: 30 TABLET | Refills: 5 | Status: SHIPPED | OUTPATIENT
Start: 2019-06-04 | End: 2019-11-26 | Stop reason: SDUPTHER

## 2019-08-07 RX ORDER — FERROUS SULFATE 325(65) MG
TABLET ORAL
Qty: 60 TABLET | Refills: 5 | Status: SHIPPED | OUTPATIENT
Start: 2019-08-07 | End: 2020-02-03

## 2019-08-07 RX ORDER — CARVEDILOL 6.25 MG/1
TABLET ORAL
Qty: 60 TABLET | Refills: 0 | Status: SHIPPED | OUTPATIENT
Start: 2019-08-07 | End: 2019-09-15 | Stop reason: SDUPTHER

## 2019-09-03 RX ORDER — METFORMIN HYDROCHLORIDE 500 MG/1
TABLET, EXTENDED RELEASE ORAL
Qty: 60 TABLET | Refills: 2 | Status: SHIPPED | OUTPATIENT
Start: 2019-09-03 | End: 2020-01-31 | Stop reason: SDUPTHER

## 2019-09-16 RX ORDER — CARVEDILOL 6.25 MG/1
TABLET ORAL
Qty: 60 TABLET | Refills: 0 | Status: SHIPPED | OUTPATIENT
Start: 2019-09-16 | End: 2019-10-21 | Stop reason: SDUPTHER

## 2019-10-21 ENCOUNTER — TELEPHONE (OUTPATIENT)
Dept: INTERNAL MEDICINE | Facility: CLINIC | Age: 44
End: 2019-10-21

## 2019-10-21 RX ORDER — FLUTICASONE PROPIONATE 50 MCG
2 SPRAY, SUSPENSION (ML) NASAL DAILY
Qty: 16 G | Refills: 5 | Status: SHIPPED | OUTPATIENT
Start: 2019-10-21 | End: 2020-01-31 | Stop reason: SDUPTHER

## 2019-10-21 RX ORDER — CARVEDILOL 6.25 MG/1
6.25 TABLET ORAL 2 TIMES DAILY WITH MEALS
Qty: 60 TABLET | Refills: 0 | Status: SHIPPED | OUTPATIENT
Start: 2019-10-21 | End: 2019-11-22 | Stop reason: SDUPTHER

## 2019-10-29 DIAGNOSIS — I10 BENIGN ESSENTIAL HYPERTENSION: ICD-10-CM

## 2019-10-29 RX ORDER — HYDROCHLOROTHIAZIDE 12.5 MG/1
TABLET ORAL
Qty: 30 TABLET | Refills: 0 | Status: SHIPPED | OUTPATIENT
Start: 2019-10-29 | End: 2020-01-31 | Stop reason: SDUPTHER

## 2019-11-25 RX ORDER — CARVEDILOL 6.25 MG/1
TABLET ORAL
Qty: 60 TABLET | Refills: 0 | Status: SHIPPED | OUTPATIENT
Start: 2019-11-25 | End: 2019-12-23 | Stop reason: SDUPTHER

## 2019-11-26 RX ORDER — LEVOCETIRIZINE DIHYDROCHLORIDE 5 MG/1
5 TABLET, FILM COATED ORAL EVERY EVENING
Qty: 30 TABLET | Refills: 0 | Status: SHIPPED | OUTPATIENT
Start: 2019-11-26 | End: 2019-12-13 | Stop reason: SDUPTHER

## 2019-12-05 ENCOUNTER — TRANSCRIBE ORDERS (OUTPATIENT)
Dept: ADMINISTRATIVE | Facility: HOSPITAL | Age: 44
End: 2019-12-05

## 2019-12-05 DIAGNOSIS — Z12.31 VISIT FOR SCREENING MAMMOGRAM: Primary | ICD-10-CM

## 2019-12-16 RX ORDER — LEVOCETIRIZINE DIHYDROCHLORIDE 5 MG/1
5 TABLET, FILM COATED ORAL EVERY EVENING
Qty: 30 TABLET | Refills: 0 | Status: SHIPPED | OUTPATIENT
Start: 2019-12-16 | End: 2020-01-18 | Stop reason: SDUPTHER

## 2019-12-23 RX ORDER — CARVEDILOL 6.25 MG/1
6.25 TABLET ORAL 2 TIMES DAILY WITH MEALS
Qty: 60 TABLET | Refills: 0 | Status: SHIPPED | OUTPATIENT
Start: 2019-12-23 | End: 2020-01-18 | Stop reason: SDUPTHER

## 2020-01-20 RX ORDER — CARVEDILOL 6.25 MG/1
6.25 TABLET ORAL 2 TIMES DAILY WITH MEALS
Qty: 60 TABLET | Refills: 0 | Status: SHIPPED | OUTPATIENT
Start: 2020-01-20 | End: 2020-01-31 | Stop reason: SDUPTHER

## 2020-01-20 RX ORDER — LEVOCETIRIZINE DIHYDROCHLORIDE 5 MG/1
5 TABLET, FILM COATED ORAL EVERY EVENING
Qty: 30 TABLET | Refills: 0 | Status: SHIPPED | OUTPATIENT
Start: 2020-01-20 | End: 2020-01-22

## 2020-01-22 RX ORDER — LEVOCETIRIZINE DIHYDROCHLORIDE 5 MG/1
5 TABLET, FILM COATED ORAL EVERY EVENING
Qty: 30 TABLET | Refills: 0 | Status: SHIPPED | OUTPATIENT
Start: 2020-01-22 | End: 2020-01-31 | Stop reason: SDUPTHER

## 2020-01-31 ENCOUNTER — OFFICE VISIT (OUTPATIENT)
Dept: INTERNAL MEDICINE | Facility: CLINIC | Age: 45
End: 2020-01-31

## 2020-01-31 VITALS
HEIGHT: 67 IN | DIASTOLIC BLOOD PRESSURE: 80 MMHG | RESPIRATION RATE: 16 BRPM | BODY MASS INDEX: 35.56 KG/M2 | SYSTOLIC BLOOD PRESSURE: 120 MMHG | TEMPERATURE: 98.2 F | HEART RATE: 84 BPM | WEIGHT: 226.6 LBS | OXYGEN SATURATION: 99 %

## 2020-01-31 DIAGNOSIS — K21.9 GASTROESOPHAGEAL REFLUX DISEASE, ESOPHAGITIS PRESENCE NOT SPECIFIED: ICD-10-CM

## 2020-01-31 DIAGNOSIS — I10 BENIGN ESSENTIAL HYPERTENSION: Primary | ICD-10-CM

## 2020-01-31 DIAGNOSIS — K91.5 POST-CHOLECYSTECTOMY SYNDROME: ICD-10-CM

## 2020-01-31 DIAGNOSIS — R19.7 DIARRHEA, UNSPECIFIED TYPE: ICD-10-CM

## 2020-01-31 DIAGNOSIS — E28.2 PCOS (POLYCYSTIC OVARIAN SYNDROME): ICD-10-CM

## 2020-01-31 DIAGNOSIS — L30.9 DERMATITIS: ICD-10-CM

## 2020-01-31 DIAGNOSIS — J30.9 ALLERGIC RHINITIS: ICD-10-CM

## 2020-01-31 PROCEDURE — 99214 OFFICE O/P EST MOD 30 MIN: CPT | Performed by: NURSE PRACTITIONER

## 2020-01-31 RX ORDER — FLUTICASONE PROPIONATE 50 MCG
2 SPRAY, SUSPENSION (ML) NASAL DAILY
Qty: 16 G | Refills: 5 | Status: SHIPPED | OUTPATIENT
Start: 2020-01-31 | End: 2021-01-26

## 2020-01-31 RX ORDER — MONTELUKAST SODIUM 10 MG/1
10 TABLET ORAL
Qty: 90 TABLET | Refills: 1 | Status: SHIPPED | OUTPATIENT
Start: 2020-01-31 | End: 2020-08-31

## 2020-01-31 RX ORDER — HYDROCHLOROTHIAZIDE 12.5 MG/1
12.5 TABLET ORAL DAILY
Qty: 90 TABLET | Refills: 1 | Status: SHIPPED | OUTPATIENT
Start: 2020-01-31 | End: 2020-08-10

## 2020-01-31 RX ORDER — MONTELUKAST SODIUM 4 MG/1
2 TABLET, CHEWABLE ORAL 2 TIMES DAILY
Qty: 360 TABLET | Refills: 5 | Status: SHIPPED | OUTPATIENT
Start: 2020-01-31 | End: 2020-08-18

## 2020-01-31 RX ORDER — METFORMIN HYDROCHLORIDE 500 MG/1
500 TABLET, EXTENDED RELEASE ORAL 2 TIMES DAILY
Qty: 180 TABLET | Refills: 1 | Status: SHIPPED | OUTPATIENT
Start: 2020-01-31 | End: 2020-08-10

## 2020-01-31 RX ORDER — CLOTRIMAZOLE AND BETAMETHASONE DIPROPIONATE 10; .64 MG/G; MG/G
CREAM TOPICAL 2 TIMES DAILY
Qty: 45 G | Refills: 1 | Status: SHIPPED | OUTPATIENT
Start: 2020-01-31 | End: 2020-02-10 | Stop reason: SINTOL

## 2020-01-31 RX ORDER — OMEPRAZOLE 40 MG/1
40 CAPSULE, DELAYED RELEASE ORAL 2 TIMES DAILY
Qty: 60 CAPSULE | Refills: 5 | Status: SHIPPED | OUTPATIENT
Start: 2020-01-31 | End: 2020-08-18

## 2020-01-31 RX ORDER — AZELASTINE 1 MG/ML
2 SPRAY, METERED NASAL 2 TIMES DAILY
Qty: 1 EACH | Refills: 12 | Status: SHIPPED | OUTPATIENT
Start: 2020-01-31 | End: 2021-02-22

## 2020-01-31 RX ORDER — LEVOCETIRIZINE DIHYDROCHLORIDE 5 MG/1
5 TABLET, FILM COATED ORAL EVERY EVENING
Qty: 90 TABLET | Refills: 1 | Status: SHIPPED | OUTPATIENT
Start: 2020-01-31 | End: 2020-09-04 | Stop reason: SDUPTHER

## 2020-01-31 RX ORDER — CARVEDILOL 6.25 MG/1
6.25 TABLET ORAL 2 TIMES DAILY WITH MEALS
Qty: 180 TABLET | Refills: 1 | Status: SHIPPED | OUTPATIENT
Start: 2020-01-31 | End: 2020-02-17

## 2020-02-03 RX ORDER — FERROUS SULFATE 325(65) MG
TABLET ORAL
Qty: 180 TABLET | Refills: 0 | Status: SHIPPED | OUTPATIENT
Start: 2020-02-03 | End: 2020-08-18

## 2020-02-05 ENCOUNTER — LAB (OUTPATIENT)
Dept: INTERNAL MEDICINE | Facility: CLINIC | Age: 45
End: 2020-02-05

## 2020-02-05 DIAGNOSIS — E28.2 PCOS (POLYCYSTIC OVARIAN SYNDROME): ICD-10-CM

## 2020-02-05 DIAGNOSIS — I10 BENIGN ESSENTIAL HYPERTENSION: ICD-10-CM

## 2020-02-05 DIAGNOSIS — R19.7 DIARRHEA, UNSPECIFIED TYPE: ICD-10-CM

## 2020-02-05 DIAGNOSIS — K21.9 GASTROESOPHAGEAL REFLUX DISEASE, ESOPHAGITIS PRESENCE NOT SPECIFIED: ICD-10-CM

## 2020-02-05 DIAGNOSIS — J30.9 ALLERGIC RHINITIS: ICD-10-CM

## 2020-02-05 DIAGNOSIS — K91.5 POST-CHOLECYSTECTOMY SYNDROME: ICD-10-CM

## 2020-02-05 LAB
25(OH)D3 SERPL-MCNC: 48.3 NG/ML (ref 30–100)
ALBUMIN SERPL-MCNC: 3.9 G/DL (ref 3.5–5.2)
ALBUMIN/GLOB SERPL: 1.3 G/DL
ALP SERPL-CCNC: 47 U/L (ref 39–117)
ALT SERPL W P-5'-P-CCNC: 19 U/L (ref 1–33)
ANION GAP SERPL CALCULATED.3IONS-SCNC: 13.6 MMOL/L (ref 5–15)
AST SERPL-CCNC: 17 U/L (ref 1–32)
BASOPHILS # BLD AUTO: 0.06 10*3/MM3 (ref 0–0.2)
BASOPHILS NFR BLD AUTO: 0.8 % (ref 0–1.5)
BILIRUB SERPL-MCNC: 0.7 MG/DL (ref 0.2–1.2)
BUN BLD-MCNC: 11 MG/DL (ref 6–20)
BUN/CREAT SERPL: 16.4 (ref 7–25)
CALCIUM SPEC-SCNC: 9.4 MG/DL (ref 8.6–10.5)
CHLORIDE SERPL-SCNC: 101 MMOL/L (ref 98–107)
CHOLEST SERPL-MCNC: 197 MG/DL (ref 0–200)
CO2 SERPL-SCNC: 25.4 MMOL/L (ref 22–29)
CREAT BLD-MCNC: 0.67 MG/DL (ref 0.57–1)
DEPRECATED RDW RBC AUTO: 41.3 FL (ref 37–54)
EOSINOPHIL # BLD AUTO: 0.11 10*3/MM3 (ref 0–0.4)
EOSINOPHIL NFR BLD AUTO: 1.5 % (ref 0.3–6.2)
ERYTHROCYTE [DISTWIDTH] IN BLOOD BY AUTOMATED COUNT: 11.9 % (ref 12.3–15.4)
GFR SERPL CREATININE-BSD FRML MDRD: 117 ML/MIN/1.73
GLOBULIN UR ELPH-MCNC: 2.9 GM/DL
GLUCOSE BLD-MCNC: 95 MG/DL (ref 65–99)
HCT VFR BLD AUTO: 39.9 % (ref 34–46.6)
HDLC SERPL-MCNC: 54 MG/DL (ref 40–60)
HGB BLD-MCNC: 13.3 G/DL (ref 12–15.9)
IMM GRANULOCYTES # BLD AUTO: 0.01 10*3/MM3 (ref 0–0.05)
IMM GRANULOCYTES NFR BLD AUTO: 0.1 % (ref 0–0.5)
LDLC SERPL CALC-MCNC: 117 MG/DL (ref 0–100)
LDLC/HDLC SERPL: 2.17 {RATIO}
LYMPHOCYTES # BLD AUTO: 3.25 10*3/MM3 (ref 0.7–3.1)
LYMPHOCYTES NFR BLD AUTO: 43.5 % (ref 19.6–45.3)
MCH RBC QN AUTO: 31.7 PG (ref 26.6–33)
MCHC RBC AUTO-ENTMCNC: 33.3 G/DL (ref 31.5–35.7)
MCV RBC AUTO: 95 FL (ref 79–97)
MONOCYTES # BLD AUTO: 0.46 10*3/MM3 (ref 0.1–0.9)
MONOCYTES NFR BLD AUTO: 6.2 % (ref 5–12)
NEUTROPHILS # BLD AUTO: 3.58 10*3/MM3 (ref 1.7–7)
NEUTROPHILS NFR BLD AUTO: 47.9 % (ref 42.7–76)
NRBC BLD AUTO-RTO: 0 /100 WBC (ref 0–0.2)
PLATELET # BLD AUTO: 294 10*3/MM3 (ref 140–450)
PMV BLD AUTO: 10.3 FL (ref 6–12)
POTASSIUM BLD-SCNC: 3.5 MMOL/L (ref 3.5–5.2)
PROT SERPL-MCNC: 6.8 G/DL (ref 6–8.5)
RBC # BLD AUTO: 4.2 10*6/MM3 (ref 3.77–5.28)
SODIUM BLD-SCNC: 140 MMOL/L (ref 136–145)
TRIGL SERPL-MCNC: 128 MG/DL (ref 0–150)
TSH SERPL DL<=0.05 MIU/L-ACNC: 1.89 UIU/ML (ref 0.27–4.2)
VIT B12 BLD-MCNC: 301 PG/ML (ref 211–946)
VLDLC SERPL-MCNC: 25.6 MG/DL (ref 5–40)
WBC NRBC COR # BLD: 7.47 10*3/MM3 (ref 3.4–10.8)

## 2020-02-05 PROCEDURE — 80053 COMPREHEN METABOLIC PANEL: CPT | Performed by: NURSE PRACTITIONER

## 2020-02-05 PROCEDURE — 82306 VITAMIN D 25 HYDROXY: CPT | Performed by: NURSE PRACTITIONER

## 2020-02-05 PROCEDURE — 80061 LIPID PANEL: CPT | Performed by: NURSE PRACTITIONER

## 2020-02-05 PROCEDURE — 84443 ASSAY THYROID STIM HORMONE: CPT | Performed by: NURSE PRACTITIONER

## 2020-02-05 PROCEDURE — 85025 COMPLETE CBC W/AUTO DIFF WBC: CPT | Performed by: NURSE PRACTITIONER

## 2020-02-05 PROCEDURE — 82607 VITAMIN B-12: CPT | Performed by: NURSE PRACTITIONER

## 2020-02-10 ENCOUNTER — OFFICE VISIT (OUTPATIENT)
Dept: INTERNAL MEDICINE | Facility: CLINIC | Age: 45
End: 2020-02-10

## 2020-02-10 VITALS
SYSTOLIC BLOOD PRESSURE: 118 MMHG | HEIGHT: 67 IN | WEIGHT: 226.8 LBS | TEMPERATURE: 98.2 F | RESPIRATION RATE: 16 BRPM | BODY MASS INDEX: 35.6 KG/M2 | DIASTOLIC BLOOD PRESSURE: 78 MMHG | OXYGEN SATURATION: 100 % | HEART RATE: 74 BPM

## 2020-02-10 DIAGNOSIS — L30.9 DERMATITIS: Primary | ICD-10-CM

## 2020-02-10 PROCEDURE — 99213 OFFICE O/P EST LOW 20 MIN: CPT | Performed by: NURSE PRACTITIONER

## 2020-02-10 NOTE — PROGRESS NOTES
Subjective   Mariusz Yepez is a 44 y.o. female    Chief Complaint   Patient presents with   • Allergic Reaction     skin reaction under arms since using the Lotrisone cream.      Rash   This is a new problem. The current episode started in the past 7 days. The problem has been gradually improving since onset. Location: bilateral axilla. The rash is characterized by burning, itchiness, peeling and redness. Associated with: lotrisone cream. Pertinent negatives include no anorexia, congestion, cough, diarrhea, eye pain, facial edema, fatigue, fever, joint pain, nail changes, rhinorrhea, shortness of breath, sore throat or vomiting. Treatments tried: d/c'd the cream. The treatment provided mild relief. There is no history of allergies, asthma, eczema or varicella.      Was given lotrisone on 1/31/2020 for irritation from a new deodorant.  Sx's were mildly improving, but began to worsen on Friday.  Stopped using the cream on Friday and sx's have gradually improved.      The following portions of the patient's history were reviewed and updated as appropriate: allergies, current medications, past family history, past medical history, past social history, past surgical history and problem list.    Current Outpatient Medications:   •  albuterol (PROVENTIL HFA;VENTOLIN HFA) 108 (90 Base) MCG/ACT inhaler, Inhale 2 puffs Every 4 (Four) Hours As Needed for Wheezing., Disp: 1 inhaler, Rfl: 2  •  azelastine (ASTELIN) 0.1 % nasal spray, 2 sprays into the nostril(s) as directed by provider 2 (Two) Times a Day. Use in each nostril as directed, Disp: 1 each, Rfl: 12  •  carvedilol (COREG) 6.25 MG tablet, Take 1 tablet by mouth 2 (Two) Times a Day With Meals., Disp: 180 tablet, Rfl: 1  •  Cholecalciferol (VITAMIN D3) 2000 UNITS tablet, Take  by mouth., Disp: , Rfl:   •  clotrimazole-betamethasone (LOTRISONE) 1-0.05 % cream, Apply  topically to the appropriate area as directed 2 (Two) Times a Day., Disp: 45 g, Rfl: 1  •   colestipol (COLESTID) 1 g tablet, Take 2 tablets by mouth 2 (Two) Times a Day., Disp: 360 tablet, Rfl: 5  •  FEROSUL 325 (65 Fe) MG tablet, TAKE 1 TABLET BY MOUTH TWICE A DAY WITH FOOD, Disp: 180 tablet, Rfl: 0  •  fluticasone (FLONASE) 50 MCG/ACT nasal spray, 2 sprays into the nostril(s) as directed by provider Daily., Disp: 16 g, Rfl: 5  •  hydroCHLOROthiazide (HYDRODIURIL) 12.5 MG tablet, Take 1 tablet by mouth Daily., Disp: 90 tablet, Rfl: 1  •  levocetirizine (XYZAL) 5 MG tablet, Take 1 tablet by mouth Every Evening., Disp: 90 tablet, Rfl: 1  •  LO LOESTRIN FE 1 MG-10 MCG / 10 MCG tablet, 1 po qd, Disp: , Rfl: 1  •  Melatonin 3 MG tablet, Take  by mouth Every Night., Disp: , Rfl:   •  metFORMIN ER (GLUCOPHAGE-XR) 500 MG 24 hr tablet, Take 1 tablet by mouth 2 (Two) Times a Day., Disp: 180 tablet, Rfl: 1  •  montelukast (SINGULAIR) 10 MG tablet, Take 1 tablet by mouth every night at bedtime., Disp: 90 tablet, Rfl: 1  •  Omega-3 Fatty Acids (FISH OIL) 1200 MG capsule capsule, Take 1,200 mg by mouth Daily., Disp: , Rfl:   •  omeprazole (priLOSEC) 40 MG capsule, Take 1 capsule by mouth 2 (Two) Times a Day., Disp: 60 capsule, Rfl: 5  •  QVAR REDIHALER 80 MCG/ACT inhaler, Inhale 1 puff 2 (Two) Times a Day., Disp: , Rfl:      Review of Systems   Constitutional: Negative for chills, fatigue and fever.   HENT: Negative for congestion, rhinorrhea and sore throat.    Eyes: Negative for pain.   Respiratory: Negative for cough, chest tightness and shortness of breath.    Cardiovascular: Negative for chest pain.   Gastrointestinal: Negative for abdominal pain, anorexia, diarrhea, nausea and vomiting.   Endocrine: Negative for cold intolerance and heat intolerance.   Musculoskeletal: Negative for arthralgias and joint pain.   Skin: Positive for rash (bilateral axilla). Negative for nail changes.   Neurological: Negative for dizziness.       Objective   Physical Exam   Constitutional: She is oriented to person, place, and time.  "She appears well-developed and well-nourished.   HENT:   Head: Normocephalic and atraumatic.   Eyes: Pupils are equal, round, and reactive to light. Conjunctivae and EOM are normal.   Neck: Normal range of motion.   Cardiovascular: Normal rate, regular rhythm and normal heart sounds.   Pulmonary/Chest: Effort normal and breath sounds normal.   Abdominal: Soft. Bowel sounds are normal.   Musculoskeletal: Normal range of motion.   Neurological: She is alert and oriented to person, place, and time. She has normal reflexes.   Skin: Skin is warm and dry.   Bilateral axilla with mild erythema/irritation.  Left with slight peeling; no drainage or induration.   Psychiatric: She has a normal mood and affect. Her behavior is normal. Judgment and thought content normal.     Vitals:    02/10/20 1056   BP: 118/78   Pulse: 74   Resp: 16   Temp: 98.2 °F (36.8 °C)   TempSrc: Temporal   SpO2: 100%   Weight: 103 kg (226 lb 12.8 oz)   Height: 169 cm (66.54\")         Assessment/Plan   Mariusz was seen today for allergic reaction.    Diagnoses and all orders for this visit:    Dermatitis    D/C lotrisone  Use mild soap and water  May use benadryl cream PRN  To derm if sx's worsen or do not improve             "

## 2020-02-14 DIAGNOSIS — I10 BENIGN ESSENTIAL HYPERTENSION: ICD-10-CM

## 2020-02-17 ENCOUNTER — HOSPITAL ENCOUNTER (OUTPATIENT)
Dept: MAMMOGRAPHY | Facility: HOSPITAL | Age: 45
Discharge: HOME | End: 2020-02-17
Admitting: OBSTETRICS & GYNECOLOGY

## 2020-02-17 DIAGNOSIS — Z12.31 VISIT FOR SCREENING MAMMOGRAM: ICD-10-CM

## 2020-02-17 PROCEDURE — 77063 BREAST TOMOSYNTHESIS BI: CPT

## 2020-02-17 PROCEDURE — 77067 SCR MAMMO BI INCL CAD: CPT | Performed by: RADIOLOGY

## 2020-02-17 PROCEDURE — 77067 SCR MAMMO BI INCL CAD: CPT

## 2020-02-17 PROCEDURE — 77063 BREAST TOMOSYNTHESIS BI: CPT | Performed by: RADIOLOGY

## 2020-02-17 RX ORDER — CARVEDILOL 6.25 MG/1
TABLET ORAL
Qty: 60 TABLET | Refills: 5 | Status: SHIPPED | OUTPATIENT
Start: 2020-02-17 | End: 2020-08-18 | Stop reason: SDUPTHER

## 2020-04-21 RX ORDER — BECLOMETHASONE DIPROPIONATE HFA 80 UG/1
AEROSOL, METERED RESPIRATORY (INHALATION)
Qty: 10.6 G | Refills: 5 | Status: SHIPPED | OUTPATIENT
Start: 2020-04-21

## 2020-08-08 DIAGNOSIS — I10 BENIGN ESSENTIAL HYPERTENSION: ICD-10-CM

## 2020-08-08 DIAGNOSIS — E28.2 PCOS (POLYCYSTIC OVARIAN SYNDROME): ICD-10-CM

## 2020-08-10 DIAGNOSIS — I10 BENIGN ESSENTIAL HYPERTENSION: ICD-10-CM

## 2020-08-10 RX ORDER — METFORMIN HYDROCHLORIDE 500 MG/1
TABLET, EXTENDED RELEASE ORAL
Qty: 30 TABLET | Refills: 0 | Status: SHIPPED | OUTPATIENT
Start: 2020-08-10 | End: 2020-08-18 | Stop reason: SDUPTHER

## 2020-08-10 RX ORDER — HYDROCHLOROTHIAZIDE 12.5 MG/1
12.5 TABLET ORAL DAILY
Qty: 90 TABLET | OUTPATIENT
Start: 2020-08-10

## 2020-08-10 RX ORDER — HYDROCHLOROTHIAZIDE 12.5 MG/1
12.5 TABLET ORAL DAILY
Qty: 30 TABLET | Refills: 0 | Status: SHIPPED | OUTPATIENT
Start: 2020-08-10 | End: 2020-08-18 | Stop reason: SDUPTHER

## 2020-08-18 ENCOUNTER — TELEMEDICINE (OUTPATIENT)
Dept: INTERNAL MEDICINE | Facility: CLINIC | Age: 45
End: 2020-08-18

## 2020-08-18 DIAGNOSIS — E28.2 PCOS (POLYCYSTIC OVARIAN SYNDROME): ICD-10-CM

## 2020-08-18 DIAGNOSIS — E55.9 VITAMIN D DEFICIENCY: ICD-10-CM

## 2020-08-18 DIAGNOSIS — K21.9 GASTROESOPHAGEAL REFLUX DISEASE, ESOPHAGITIS PRESENCE NOT SPECIFIED: ICD-10-CM

## 2020-08-18 DIAGNOSIS — I10 BENIGN ESSENTIAL HYPERTENSION: Primary | ICD-10-CM

## 2020-08-18 DIAGNOSIS — Z11.59 ENCOUNTER FOR HEPATITIS C SCREENING TEST FOR LOW RISK PATIENT: ICD-10-CM

## 2020-08-18 DIAGNOSIS — J30.9 ALLERGIC RHINITIS, UNSPECIFIED SEASONALITY, UNSPECIFIED TRIGGER: ICD-10-CM

## 2020-08-18 PROCEDURE — 99214 OFFICE O/P EST MOD 30 MIN: CPT | Performed by: NURSE PRACTITIONER

## 2020-08-18 RX ORDER — CARVEDILOL 6.25 MG/1
6.25 TABLET ORAL 2 TIMES DAILY WITH MEALS
Qty: 180 TABLET | Refills: 1 | Status: SHIPPED | OUTPATIENT
Start: 2020-08-18 | End: 2021-01-29

## 2020-08-18 RX ORDER — METFORMIN HYDROCHLORIDE 500 MG/1
500 TABLET, EXTENDED RELEASE ORAL 2 TIMES DAILY
Qty: 180 TABLET | Refills: 1 | Status: SHIPPED | OUTPATIENT
Start: 2020-08-18 | End: 2021-02-05 | Stop reason: SDUPTHER

## 2020-08-18 RX ORDER — HYDROCHLOROTHIAZIDE 12.5 MG/1
12.5 TABLET ORAL DAILY
Qty: 90 TABLET | Refills: 1 | Status: SHIPPED | OUTPATIENT
Start: 2020-08-18 | End: 2021-01-29

## 2020-08-18 RX ORDER — FERROUS SULFATE 325(65) MG
325 TABLET ORAL
COMMUNITY
End: 2020-10-21

## 2020-08-18 NOTE — PROGRESS NOTES
Subjective   Mariusz Yepez is a 44 y.o. female    Chief Complaint   Patient presents with   • Allergic Rhinitis   • Hypertension   • Heartburn   • Anemia   • Polycystic Ovary Syndrome     History of Present Illness     This was an audio and video enabled telemedicine encounter.    Anemia - chronic; on ferrous sulfate 325 mg daily       Allergy sx's are better. I referred her to an allergist in August 2016. She added Qvar to her Singulair, Albuterol, Flonase, Xyzal, and astelin. Really only taking the Singulair and Flonase; sx's have been really well controlled.  See Allergist every 6 months.  Also has a nebulizer machine that she uses PRN.      HTN - chronic; stable on Coreg 6.25 and HCTZ 12.5 mg daily.  She is tolerating well w/o SE.  Does not monitor BP at home.        Reflux - chronic and stable on Prilosec daily, states that sx's are so well controlled she is going to try and wean off of this     PCOS - Followed by Dr. Hanna; Taking Metformin 500mg BID for sx      GYN- Dr. Hanna; she is repeated her TV US in Sept - repeat done due to LLQ pain; had a new fibroid, but no other changes; doing Q6 months now.    mamm - 2/17/2020  last colon - 6/2020 (due 2025)  Pap summer 2017  DXA none  Eye Exam--she will call to schedule  TdaP 8/07   Flu shot - 12/2019   Hep A - 11/2018, 12/2019  EGD 12/30/13 - esophagitis       The following portions of the patient's history were reviewed and updated as appropriate: allergies, current medications, past family history, past medical history, past social history, past surgical history and problem list.    Current Outpatient Medications:   •  ferrous sulfate 325 (65 FE) MG tablet, Take 325 mg by mouth Daily With Breakfast., Disp: , Rfl:   •  albuterol (PROVENTIL HFA;VENTOLIN HFA) 108 (90 Base) MCG/ACT inhaler, Inhale 2 puffs Every 4 (Four) Hours As Needed for Wheezing., Disp: 1 inhaler, Rfl: 2  •  azelastine (ASTELIN) 0.1 % nasal spray, 2 sprays into the nostril(s) as directed  by provider 2 (Two) Times a Day. Use in each nostril as directed, Disp: 1 each, Rfl: 12  •  carvedilol (COREG) 6.25 MG tablet, Take 1 tablet by mouth 2 (Two) Times a Day With Meals., Disp: 180 tablet, Rfl: 1  •  Cholecalciferol (VITAMIN D3) 2000 UNITS tablet, Take  by mouth., Disp: , Rfl:   •  fluticasone (FLONASE) 50 MCG/ACT nasal spray, 2 sprays into the nostril(s) as directed by provider Daily., Disp: 16 g, Rfl: 5  •  hydroCHLOROthiazide (HYDRODIURIL) 12.5 MG tablet, Take 1 tablet by mouth Daily., Disp: 90 tablet, Rfl: 1  •  levocetirizine (XYZAL) 5 MG tablet, Take 1 tablet by mouth Every Evening., Disp: 90 tablet, Rfl: 1  •  LO LOESTRIN FE 1 MG-10 MCG / 10 MCG tablet, 1 po qd, Disp: , Rfl: 1  •  Melatonin 3 MG tablet, Take  by mouth Every Night., Disp: , Rfl:   •  metFORMIN ER (GLUCOPHAGE-XR) 500 MG 24 hr tablet, Take 1 tablet by mouth 2 (Two) Times a Day., Disp: 180 tablet, Rfl: 1  •  montelukast (SINGULAIR) 10 MG tablet, Take 1 tablet by mouth every night at bedtime., Disp: 90 tablet, Rfl: 1  •  Omega-3 Fatty Acids (FISH OIL) 1200 MG capsule capsule, Take 1,200 mg by mouth Daily., Disp: , Rfl:   •  QVAR REDIHALER 80 MCG/ACT inhaler, INHALE 1 PUFF BY MOUTH TWICE DAILY, Disp: 10.6 g, Rfl: 5     Review of Systems   Constitutional: Negative for chills, fatigue and fever.   Respiratory: Negative for cough, chest tightness and shortness of breath.    Cardiovascular: Negative for chest pain.   Gastrointestinal: Negative for abdominal pain, diarrhea, nausea and vomiting.   Endocrine: Negative for cold intolerance and heat intolerance.   Musculoskeletal: Negative for arthralgias.   Neurological: Negative for dizziness.       Objective   Physical Exam   Constitutional: She is oriented to person, place, and time. She appears well-developed and well-nourished.   HENT:   Head: Normocephalic and atraumatic.   Eyes: Pupils are equal, round, and reactive to light. Conjunctivae are normal.   Neck: Normal range of motion.    Pulmonary/Chest: Effort normal.   Musculoskeletal: Normal range of motion.   Neurological: She is alert and oriented to person, place, and time.   Psychiatric: She has a normal mood and affect. Her behavior is normal. Judgment and thought content normal.     There were no vitals filed for this visit.      Assessment/Plan   Mariusz was seen today for allergic rhinitis, hypertension, heartburn, anemia and polycystic ovary syndrome.    Diagnoses and all orders for this visit:    Benign essential hypertension  -     CBC & Differential; Future  -     Comprehensive Metabolic Panel; Future  -     Lipid Panel; Future  -     TSH; Future  -     Vitamin B12; Future  -     Vitamin D 25 Hydroxy; Future  -     carvedilol (COREG) 6.25 MG tablet; Take 1 tablet by mouth 2 (Two) Times a Day With Meals.  -     hydroCHLOROthiazide (HYDRODIURIL) 12.5 MG tablet; Take 1 tablet by mouth Daily.    Allergic rhinitis, unspecified seasonality, unspecified trigger  -     CBC & Differential; Future  -     Comprehensive Metabolic Panel; Future  -     Lipid Panel; Future  -     TSH; Future  -     Vitamin B12; Future  -     Vitamin D 25 Hydroxy; Future    Vitamin D deficiency  -     CBC & Differential; Future  -     Comprehensive Metabolic Panel; Future  -     Lipid Panel; Future  -     TSH; Future  -     Vitamin B12; Future  -     Vitamin D 25 Hydroxy; Future    Gastroesophageal reflux disease, esophagitis presence not specified  -     CBC & Differential; Future  -     Comprehensive Metabolic Panel; Future  -     Lipid Panel; Future  -     TSH; Future  -     Vitamin B12; Future  -     Vitamin D 25 Hydroxy; Future    Encounter for hepatitis C screening test for low risk patient  -     Hepatitis C Antibody; Future    PCOS (polycystic ovarian syndrome)  -     metFORMIN ER (GLUCOPHAGE-XR) 500 MG 24 hr tablet; Take 1 tablet by mouth 2 (Two) Times a Day.      Meds refilled  Pt will present to the clinic for labs  No changes made  Return in about 6  months (around 2/18/2021) for Annual.

## 2020-08-19 ENCOUNTER — LAB (OUTPATIENT)
Dept: LAB | Facility: HOSPITAL | Age: 45
End: 2020-08-19

## 2020-08-19 DIAGNOSIS — E55.9 VITAMIN D DEFICIENCY: ICD-10-CM

## 2020-08-19 DIAGNOSIS — J30.9 ALLERGIC RHINITIS, UNSPECIFIED SEASONALITY, UNSPECIFIED TRIGGER: ICD-10-CM

## 2020-08-19 DIAGNOSIS — K21.9 GASTROESOPHAGEAL REFLUX DISEASE, ESOPHAGITIS PRESENCE NOT SPECIFIED: ICD-10-CM

## 2020-08-19 DIAGNOSIS — I10 BENIGN ESSENTIAL HYPERTENSION: ICD-10-CM

## 2020-08-19 DIAGNOSIS — Z11.59 ENCOUNTER FOR HEPATITIS C SCREENING TEST FOR LOW RISK PATIENT: ICD-10-CM

## 2020-08-19 LAB
25(OH)D3 SERPL-MCNC: 52.1 NG/ML (ref 30–100)
ALBUMIN SERPL-MCNC: 4.5 G/DL (ref 3.5–5.2)
ALBUMIN/GLOB SERPL: 1.5 G/DL
ALP SERPL-CCNC: 46 U/L (ref 39–117)
ALT SERPL W P-5'-P-CCNC: 13 U/L (ref 1–33)
ANION GAP SERPL CALCULATED.3IONS-SCNC: 12 MMOL/L (ref 5–15)
AST SERPL-CCNC: 14 U/L (ref 1–32)
BASOPHILS # BLD AUTO: 0.06 10*3/MM3 (ref 0–0.2)
BASOPHILS NFR BLD AUTO: 0.7 % (ref 0–1.5)
BILIRUB SERPL-MCNC: 0.6 MG/DL (ref 0–1.2)
BUN SERPL-MCNC: 12 MG/DL (ref 6–20)
BUN/CREAT SERPL: 17.6 (ref 7–25)
CALCIUM SPEC-SCNC: 9.5 MG/DL (ref 8.6–10.5)
CHLORIDE SERPL-SCNC: 100 MMOL/L (ref 98–107)
CHOLEST SERPL-MCNC: 240 MG/DL (ref 0–200)
CO2 SERPL-SCNC: 25 MMOL/L (ref 22–29)
CREAT SERPL-MCNC: 0.68 MG/DL (ref 0.57–1)
DEPRECATED RDW RBC AUTO: 40.4 FL (ref 37–54)
EOSINOPHIL # BLD AUTO: 0.15 10*3/MM3 (ref 0–0.4)
EOSINOPHIL NFR BLD AUTO: 1.7 % (ref 0.3–6.2)
ERYTHROCYTE [DISTWIDTH] IN BLOOD BY AUTOMATED COUNT: 11.7 % (ref 12.3–15.4)
GFR SERPL CREATININE-BSD FRML MDRD: 115 ML/MIN/1.73
GLOBULIN UR ELPH-MCNC: 3 GM/DL
GLUCOSE SERPL-MCNC: 124 MG/DL (ref 65–99)
HCT VFR BLD AUTO: 39.5 % (ref 34–46.6)
HCV AB SER DONR QL: NORMAL
HDLC SERPL-MCNC: 64 MG/DL (ref 40–60)
HGB BLD-MCNC: 13.1 G/DL (ref 12–15.9)
IMM GRANULOCYTES # BLD AUTO: 0.02 10*3/MM3 (ref 0–0.05)
IMM GRANULOCYTES NFR BLD AUTO: 0.2 % (ref 0–0.5)
LDLC SERPL CALC-MCNC: 148 MG/DL (ref 0–100)
LDLC/HDLC SERPL: 2.31 {RATIO}
LYMPHOCYTES # BLD AUTO: 3.05 10*3/MM3 (ref 0.7–3.1)
LYMPHOCYTES NFR BLD AUTO: 33.8 % (ref 19.6–45.3)
MCH RBC QN AUTO: 31.3 PG (ref 26.6–33)
MCHC RBC AUTO-ENTMCNC: 33.2 G/DL (ref 31.5–35.7)
MCV RBC AUTO: 94.5 FL (ref 79–97)
MONOCYTES # BLD AUTO: 0.62 10*3/MM3 (ref 0.1–0.9)
MONOCYTES NFR BLD AUTO: 6.9 % (ref 5–12)
NEUTROPHILS NFR BLD AUTO: 5.13 10*3/MM3 (ref 1.7–7)
NEUTROPHILS NFR BLD AUTO: 56.7 % (ref 42.7–76)
NRBC BLD AUTO-RTO: 0 /100 WBC (ref 0–0.2)
PLATELET # BLD AUTO: 323 10*3/MM3 (ref 140–450)
PMV BLD AUTO: 10.4 FL (ref 6–12)
POTASSIUM SERPL-SCNC: 3.6 MMOL/L (ref 3.5–5.2)
PROT SERPL-MCNC: 7.5 G/DL (ref 6–8.5)
RBC # BLD AUTO: 4.18 10*6/MM3 (ref 3.77–5.28)
SODIUM SERPL-SCNC: 137 MMOL/L (ref 136–145)
TRIGL SERPL-MCNC: 141 MG/DL (ref 0–150)
TSH SERPL DL<=0.05 MIU/L-ACNC: 2.34 UIU/ML (ref 0.27–4.2)
VIT B12 BLD-MCNC: 281 PG/ML (ref 211–946)
VLDLC SERPL-MCNC: 28.2 MG/DL
WBC # BLD AUTO: 9.03 10*3/MM3 (ref 3.4–10.8)

## 2020-08-19 PROCEDURE — 80053 COMPREHEN METABOLIC PANEL: CPT

## 2020-08-19 PROCEDURE — 84443 ASSAY THYROID STIM HORMONE: CPT

## 2020-08-19 PROCEDURE — 80061 LIPID PANEL: CPT

## 2020-08-19 PROCEDURE — 86803 HEPATITIS C AB TEST: CPT

## 2020-08-19 PROCEDURE — 82306 VITAMIN D 25 HYDROXY: CPT

## 2020-08-19 PROCEDURE — 85025 COMPLETE CBC W/AUTO DIFF WBC: CPT

## 2020-08-19 PROCEDURE — 82607 VITAMIN B-12: CPT

## 2020-08-31 DIAGNOSIS — R73.09 ABNORMAL GLUCOSE: Primary | ICD-10-CM

## 2020-08-31 DIAGNOSIS — J30.9 ALLERGIC RHINITIS: ICD-10-CM

## 2020-08-31 RX ORDER — MONTELUKAST SODIUM 10 MG/1
10 TABLET ORAL
Qty: 90 TABLET | Refills: 1 | Status: SHIPPED | OUTPATIENT
Start: 2020-08-31 | End: 2021-02-08 | Stop reason: SDUPTHER

## 2020-09-02 ENCOUNTER — TELEPHONE (OUTPATIENT)
Dept: INTERNAL MEDICINE | Facility: CLINIC | Age: 45
End: 2020-09-02

## 2020-09-02 NOTE — TELEPHONE ENCOUNTER
----- Message from DAVIN Jo sent at 8/31/2020  4:14 PM EDT -----  Please let pt know that her BG was elevated on recent labs.  I would like to ck an A1C - order is in    Cholesterol has gone up - tighten up on diet and exercise    All other labs were within acceptable limits.

## 2020-09-04 DIAGNOSIS — J30.9 ALLERGIC RHINITIS: ICD-10-CM

## 2020-09-08 RX ORDER — LEVOCETIRIZINE DIHYDROCHLORIDE 5 MG/1
5 TABLET, FILM COATED ORAL EVERY EVENING
Qty: 90 TABLET | Refills: 1 | Status: SHIPPED | OUTPATIENT
Start: 2020-09-08 | End: 2021-01-29

## 2020-09-16 ENCOUNTER — OFFICE VISIT (OUTPATIENT)
Dept: OBSTETRICS AND GYNECOLOGY | Facility: CLINIC | Age: 45
End: 2020-09-16

## 2020-09-16 VITALS
WEIGHT: 221 LBS | SYSTOLIC BLOOD PRESSURE: 110 MMHG | HEIGHT: 61 IN | DIASTOLIC BLOOD PRESSURE: 78 MMHG | BODY MASS INDEX: 41.72 KG/M2

## 2020-09-16 DIAGNOSIS — Z01.419 WOMEN'S ANNUAL ROUTINE GYNECOLOGICAL EXAMINATION: Primary | ICD-10-CM

## 2020-09-16 DIAGNOSIS — Z30.41 ENCOUNTER FOR SURVEILLANCE OF CONTRACEPTIVE PILLS: ICD-10-CM

## 2020-09-16 DIAGNOSIS — Z71.85 HPV VACCINE COUNSELING: ICD-10-CM

## 2020-09-16 DIAGNOSIS — Z80.0 FAMILY HISTORY OF COLON CANCER: ICD-10-CM

## 2020-09-16 DIAGNOSIS — D25.1 LEIOMYOMA, INTRAMURAL: ICD-10-CM

## 2020-09-16 PROCEDURE — 99396 PREV VISIT EST AGE 40-64: CPT | Performed by: OBSTETRICS & GYNECOLOGY

## 2020-09-16 NOTE — PROGRESS NOTES
GYN Annual Exam     CC - Here for annual exam.        [facility]  Mariusz Yepez is a 45 y.o. female, , who presents for annual well woman exam. Patient's last menstrual period was 2020..  Periods are regular every 25-35 days, lasting 2 days with a light flow. Dysmenorrhea:none.  Patient reports problems with: none.  Partner Status: Marital Status: single.  New Partners since last visit: no.  Desires STD Screening: no.    Additional OB/GYN History   Current contraception: contraceptive methods: OCP (estrogen/progesterone)  Desires to: continue contraception  Last Pap :   Last Completed Pap Smear       Status Date      PAP SMEAR Done 2019 normal with negative HPV testing     Patient has more history with this topic...        History of abnormal Pap smear: no  Family history of uterine, colon, breast, or ovarian cancer: yes - breast cancer in her maternal half aunt, colon cancer in multiple cousins on her fathers side, and ovarian cancer in a maternal half aunt.  Performs monthly Self-Breast Exam: no  Last colonoscopy: 2020 normal f/u in 5 years   Last mammogram:   Last Completed Mammogram       Status Date      MAMMOGRAM Done 2020 MAMMO SCREENING DIGITAL TOMOSYNTHESIS BILATERAL W CAD     Patient has more history with this topic...         Exercises Regularly: no  Feelings of Anxiety or Depression: no  Tobacco Usage?: No   OB History        0    Para   0    Term   0       0    AB   0    Living   0       SAB   0    TAB   0    Ectopic   0    Molar   0    Multiple   0    Live Births   0                Health Maintenance   Topic Date Due   • Annual Gynecologic Pelvic and Breast Exam  1977   •  AMB Pneumococcal Vaccine 0-64 (1 of 1 - PPSV23) 1983   • PNEUMOCOCCAL VACCINE (19-64 MEDIUM RISK) (1 of 1 - PPSV23) 1996   • ANNUAL PHYSICAL  2019   • INFLUENZA VACCINE  2020   • MAMMOGRAM  2021   • PAP SMEAR  2022   • COLONOSCOPY  2025   •  "TDAP/TD VACCINES (3 - Td) 06/12/2027   • BH AMB Pneumococcal Vaccine 65+ (1 of 1 - PPSV23) 08/30/2042   • HEPATITIS C SCREENING  Completed       The additional following portions of the patient's history were reviewed and updated as appropriate: allergies, current medications, past family history, past medical history, past social history, past surgical history and problem list.    Review of Systems   Constitutional: Negative.    HENT: Negative.    Eyes: Negative.    Respiratory: Negative.    Cardiovascular: Negative.    Gastrointestinal: Negative.    Endocrine: Negative.    Genitourinary: Negative.    Musculoskeletal: Negative.    Skin: Negative.    Allergic/Immunologic: Negative.    Neurological: Negative.    Hematological: Negative.    Psychiatric/Behavioral: Negative.        I have reviewed and agree with the HPI, ROS, and historical information as entered above. Kadie Barraza MD    Objective   /78   Ht 154.9 cm (61\")   Wt 100 kg (221 lb)   LMP 08/30/2020   Breastfeeding No   BMI 41.76 kg/m²     Physical Exam  Vitals signs and nursing note reviewed. Exam conducted with a chaperone present.   Constitutional:       Appearance: She is well-developed.   HENT:      Head: Normocephalic and atraumatic.   Neck:      Musculoskeletal: Normal range of motion. No muscular tenderness.      Thyroid: No thyroid mass or thyromegaly.   Cardiovascular:      Rate and Rhythm: Normal rate and regular rhythm.      Heart sounds: No murmur.   Pulmonary:      Effort: Pulmonary effort is normal. No retractions.      Breath sounds: Normal breath sounds. No wheezing, rhonchi or rales.   Chest:      Chest wall: No mass or tenderness.      Breasts:         Right: Normal. No mass, nipple discharge, skin change or tenderness.         Left: Normal. No mass, nipple discharge, skin change or tenderness.   Abdominal:      General: Bowel sounds are normal.      Palpations: Abdomen is soft. Abdomen is not rigid. There is no mass.      " Tenderness: There is no abdominal tenderness. There is no guarding.      Hernia: No hernia is present. There is no hernia in the left inguinal area or right inguinal area.   Genitourinary:     Exam position: Lithotomy position.      Pubic Area: No rash.       Labia:         Right: No rash, tenderness or lesion.         Left: No rash, tenderness or lesion.       Urethra: No urethral pain or urethral swelling.      Vagina: Normal. No vaginal discharge or lesions.      Cervix: No cervical motion tenderness, discharge, lesion or cervical bleeding.      Uterus: Normal. Not enlarged, not fixed and not tender.       Adnexa:         Right: No mass, tenderness or fullness.          Left: No mass, tenderness or fullness.        Rectum: No external hemorrhoid.   Neurological:      Mental Status: She is alert and oriented to person, place, and time.   Psychiatric:         Behavior: Behavior normal.            Assessment and Plan    Problem List Items Addressed This Visit     None      Visit Diagnoses     Women's annual routine gynecological examination    -  Primary    HPV vaccine counseling        Leiomyoma, intramural        Encounter for surveillance of contraceptive pills        Family history of colon cancer              1. GYN annual well woman exam.   2. Reviewed risks/benefits of hormonal contraception after age 35, including possible increased risk of breast cancer, heart disease, blood clots and strokes.  Patient strongly desires to stay on hormonal contraception.  3. Recommended use of Vitamin D replacement and getting adequate calcium in her diet. (1500mg)  4. Reviewed monthly self breast exams.  Instructed to call with lumps, pain, or breast discharge.  Continue yearly mammograms.  5. Reviewed exercise as a preventative health measures.   6. Compared previous u/s dated 7/9/19.  No significant change in fibroids.  7. HPV guidelines reviewed.  Enc consideration of HPV vaccine.    Kadie Barraza MD  09/16/2020

## 2020-09-25 RX ORDER — ACETAMINOPHEN AND CODEINE PHOSPHATE 120; 12 MG/5ML; MG/5ML
1 SOLUTION ORAL DAILY
Qty: 28 TABLET | Refills: 12 | Status: SHIPPED | OUTPATIENT
Start: 2020-09-25 | End: 2020-09-29

## 2020-09-29 RX ORDER — NORETHINDRONE ACETATE AND ETHINYL ESTRADIOL, ETHINYL ESTRADIOL AND FERROUS FUMARATE 1MG-10(24)
1 KIT ORAL DAILY
Qty: 84 TABLET | Refills: 3 | Status: SHIPPED | OUTPATIENT
Start: 2020-09-29 | End: 2021-09-13

## 2020-10-21 RX ORDER — FERROUS SULFATE 325(65) MG
TABLET ORAL
Qty: 180 TABLET | Refills: 1 | Status: SHIPPED | OUTPATIENT
Start: 2020-10-21 | End: 2021-01-29

## 2021-01-04 ENCOUNTER — TRANSCRIBE ORDERS (OUTPATIENT)
Dept: OBSTETRICS AND GYNECOLOGY | Facility: CLINIC | Age: 46
End: 2021-01-04

## 2021-01-04 DIAGNOSIS — Z12.31 VISIT FOR SCREENING MAMMOGRAM: Primary | ICD-10-CM

## 2021-01-24 DIAGNOSIS — K21.9 GASTROESOPHAGEAL REFLUX DISEASE: ICD-10-CM

## 2021-01-24 DIAGNOSIS — J30.9 ALLERGIC RHINITIS: ICD-10-CM

## 2021-01-26 RX ORDER — FLUTICASONE PROPIONATE 50 MCG
SPRAY, SUSPENSION (ML) NASAL
Qty: 48 G | Refills: 2 | Status: SHIPPED | OUTPATIENT
Start: 2021-01-26 | End: 2021-11-09

## 2021-01-26 RX ORDER — OMEPRAZOLE 40 MG/1
CAPSULE, DELAYED RELEASE ORAL
Qty: 180 CAPSULE | Refills: 1 | Status: SHIPPED | OUTPATIENT
Start: 2021-01-26 | End: 2021-07-28

## 2021-01-29 DIAGNOSIS — J30.9 ALLERGIC RHINITIS: ICD-10-CM

## 2021-01-29 DIAGNOSIS — I10 BENIGN ESSENTIAL HYPERTENSION: ICD-10-CM

## 2021-01-29 RX ORDER — CARVEDILOL 6.25 MG/1
TABLET ORAL
Qty: 180 TABLET | Refills: 1 | Status: SHIPPED | OUTPATIENT
Start: 2021-01-29 | End: 2021-07-29

## 2021-01-29 RX ORDER — LEVOCETIRIZINE DIHYDROCHLORIDE 5 MG/1
5 TABLET, FILM COATED ORAL EVERY EVENING
Qty: 90 TABLET | Refills: 1 | Status: SHIPPED | OUTPATIENT
Start: 2021-01-29 | End: 2021-07-29

## 2021-01-29 RX ORDER — FERROUS SULFATE 325(65) MG
TABLET ORAL
Qty: 180 TABLET | Refills: 1 | Status: SHIPPED | OUTPATIENT
Start: 2021-01-29 | End: 2021-07-29

## 2021-01-29 RX ORDER — HYDROCHLOROTHIAZIDE 12.5 MG/1
TABLET ORAL
Qty: 90 TABLET | Refills: 1 | Status: SHIPPED | OUTPATIENT
Start: 2021-01-29 | End: 2021-07-29

## 2021-02-05 ENCOUNTER — TELEPHONE (OUTPATIENT)
Dept: INTERNAL MEDICINE | Facility: CLINIC | Age: 46
End: 2021-02-05

## 2021-02-05 ENCOUNTER — LAB (OUTPATIENT)
Dept: LAB | Facility: HOSPITAL | Age: 46
End: 2021-02-05

## 2021-02-05 ENCOUNTER — OFFICE VISIT (OUTPATIENT)
Dept: INTERNAL MEDICINE | Facility: CLINIC | Age: 46
End: 2021-02-05

## 2021-02-05 VITALS
HEART RATE: 88 BPM | SYSTOLIC BLOOD PRESSURE: 138 MMHG | HEIGHT: 67 IN | BODY MASS INDEX: 33.59 KG/M2 | OXYGEN SATURATION: 99 % | RESPIRATION RATE: 16 BRPM | TEMPERATURE: 96.8 F | WEIGHT: 214 LBS | DIASTOLIC BLOOD PRESSURE: 88 MMHG

## 2021-02-05 DIAGNOSIS — E28.2 PCOS (POLYCYSTIC OVARIAN SYNDROME): ICD-10-CM

## 2021-02-05 DIAGNOSIS — R11.0 NAUSEA: ICD-10-CM

## 2021-02-05 DIAGNOSIS — R42 VERTIGO: Primary | ICD-10-CM

## 2021-02-05 DIAGNOSIS — R73.09 ABNORMAL GLUCOSE: ICD-10-CM

## 2021-02-05 LAB — HBA1C MFR BLD: 5 % (ref 4.8–5.6)

## 2021-02-05 PROCEDURE — 99214 OFFICE O/P EST MOD 30 MIN: CPT | Performed by: NURSE PRACTITIONER

## 2021-02-05 PROCEDURE — 83036 HEMOGLOBIN GLYCOSYLATED A1C: CPT | Performed by: NURSE PRACTITIONER

## 2021-02-05 RX ORDER — METHYLPREDNISOLONE 4 MG/1
TABLET ORAL
Qty: 21 EACH | Refills: 0 | Status: SHIPPED | OUTPATIENT
Start: 2021-02-05 | End: 2021-02-08

## 2021-02-05 RX ORDER — METFORMIN HYDROCHLORIDE 500 MG/1
500 TABLET, EXTENDED RELEASE ORAL 2 TIMES DAILY
Qty: 180 TABLET | Refills: 1 | Status: SHIPPED | OUTPATIENT
Start: 2021-02-05 | End: 2021-07-29

## 2021-02-05 RX ORDER — ONDANSETRON 4 MG/1
4 TABLET, FILM COATED ORAL EVERY 8 HOURS PRN
Qty: 45 TABLET | Refills: 0 | Status: SHIPPED | OUTPATIENT
Start: 2021-02-05 | End: 2021-02-05

## 2021-02-05 RX ORDER — ONDANSETRON 4 MG/1
4 TABLET, FILM COATED ORAL EVERY 8 HOURS PRN
Qty: 18 TABLET | Refills: 0 | Status: SHIPPED | OUTPATIENT
Start: 2021-02-05 | End: 2021-03-02

## 2021-02-05 NOTE — TELEPHONE ENCOUNTER
Called number to do PA; answered questions, quantity prescribed is 45, her ins only covers 18. We will get a response in 24-48 hourss. Spoke with Rowan at [facility]; please resend script for #18 if you agree.

## 2021-02-05 NOTE — PROGRESS NOTES
Subjective   Mariusz Yepez is a 45 y.o. female    Chief Complaint   Patient presents with   • Dizziness     c/o constant dizzyness x 3 days, intensifies with movement     Dizziness  This is a new problem. Episode onset: 3 days ago. The problem occurs intermittently. The problem has been waxing and waning. Associated symptoms include nausea. Pertinent negatives include no abdominal pain, anorexia, arthralgias, change in bowel habit, chest pain, chills, congestion, coughing, diaphoresis, fatigue, fever, headaches, joint swelling, myalgias, neck pain, numbness, rash, sore throat, swollen glands, urinary symptoms, vertigo, visual change, vomiting or weakness. The symptoms are aggravated by bending (position changes). Treatments tried: meclizine. The treatment provided no relief.      BG was elevated on labs in Sept.  She did not come back for A1C, will send today.    The following portions of the patient's history were reviewed and updated as appropriate: allergies, current medications, past family history, past medical history, past social history, past surgical history and problem list.    Current Outpatient Medications:   •  albuterol (PROVENTIL HFA;VENTOLIN HFA) 108 (90 Base) MCG/ACT inhaler, Inhale 2 puffs Every 4 (Four) Hours As Needed for Wheezing., Disp: 1 inhaler, Rfl: 2  •  azelastine (ASTELIN) 0.1 % nasal spray, 2 sprays into the nostril(s) as directed by provider 2 (Two) Times a Day. Use in each nostril as directed, Disp: 1 each, Rfl: 12  •  carvedilol (COREG) 6.25 MG tablet, TAKE 1 TABLET BY MOUTH TWICE DAILY WITH MEALS, Disp: 180 tablet, Rfl: 1  •  Cholecalciferol (VITAMIN D3) 2000 UNITS tablet, Take  by mouth., Disp: , Rfl:   •  FeroSul 325 (65 Fe) MG tablet, TAKE 1 TABLET BY MOUTH TWICE DAILY WITH FOOD, Disp: 180 tablet, Rfl: 1  •  fluticasone (FLONASE) 50 MCG/ACT nasal spray, INSTILL 2 SPRAYS IN EACH NOSTRIL AS DIRECTED BY PROVIDER DAILY, Disp: 48 g, Rfl: 2  •  hydroCHLOROthiazide (HYDRODIURIL)  12.5 MG tablet, TAKE 1 TABLET BY MOUTH EVERY DAY, Disp: 90 tablet, Rfl: 1  •  levocetirizine (XYZAL) 5 MG tablet, TAKE 1 TABLET BY MOUTH EVERY EVENING, Disp: 90 tablet, Rfl: 1  •  Lo Loestrin Fe 1 MG-10 MCG / 10 MCG tablet, Take 1 tablet by mouth Daily for 336 days. 1 po qd, Disp: 84 tablet, Rfl: 3  •  Melatonin 3 MG tablet, Take  by mouth Every Night., Disp: , Rfl:   •  metFORMIN ER (GLUCOPHAGE-XR) 500 MG 24 hr tablet, Take 1 tablet by mouth 2 (Two) Times a Day., Disp: 180 tablet, Rfl: 1  •  montelukast (SINGULAIR) 10 MG tablet, TAKE 1 TABLET BY MOUTH EVERY NIGHT AT BEDTIME, Disp: 90 tablet, Rfl: 1  •  Omega-3 Fatty Acids (FISH OIL) 1200 MG capsule capsule, Take 1,200 mg by mouth Daily., Disp: , Rfl:   •  omeprazole (priLOSEC) 40 MG capsule, TAKE 1 CAPSULE BY MOUTH TWICE DAILY, Disp: 180 capsule, Rfl: 1  •  QVAR REDIHALER 80 MCG/ACT inhaler, INHALE 1 PUFF BY MOUTH TWICE DAILY, Disp: 10.6 g, Rfl: 5  •  methylPREDNISolone (Medrol) 4 MG dose pack, Take as directed on package instructions., Disp: 21 each, Rfl: 0  •  ondansetron (Zofran) 4 MG tablet, Take 1 tablet by mouth Every 8 (Eight) Hours As Needed for Nausea or Vomiting., Disp: 45 tablet, Rfl: 0     Review of Systems   Constitutional: Negative for chills, diaphoresis, fatigue and fever.   HENT: Negative for congestion and sore throat.    Respiratory: Negative for cough, chest tightness and shortness of breath.    Cardiovascular: Negative for chest pain.   Gastrointestinal: Positive for nausea. Negative for abdominal pain, anorexia, change in bowel habit, diarrhea and vomiting.   Endocrine: Negative for cold intolerance and heat intolerance.   Musculoskeletal: Negative for arthralgias, joint swelling, myalgias and neck pain.   Skin: Negative for rash.   Neurological: Positive for dizziness. Negative for vertigo, weakness, numbness and headaches.       Objective   Physical Exam  Constitutional:       Appearance: She is well-developed.   HENT:      Head:  "Normocephalic and atraumatic.   Eyes:      Extraocular Movements:      Right eye: Nystagmus present.      Left eye: Nystagmus present.      Conjunctiva/sclera: Conjunctivae normal.      Pupils: Pupils are equal, round, and reactive to light.   Neck:      Musculoskeletal: Normal range of motion.   Cardiovascular:      Rate and Rhythm: Normal rate and regular rhythm.      Heart sounds: Normal heart sounds.   Pulmonary:      Effort: Pulmonary effort is normal.      Breath sounds: Normal breath sounds.   Abdominal:      General: Bowel sounds are normal.      Palpations: Abdomen is soft.   Musculoskeletal: Normal range of motion.   Skin:     General: Skin is warm and dry.   Neurological:      Mental Status: She is alert and oriented to person, place, and time.      Cranial Nerves: Cranial nerves are intact.      Sensory: Sensation is intact.      Motor: Motor function is intact.      Coordination: Romberg sign positive.      Gait: Gait is intact.      Deep Tendon Reflexes: Reflexes are normal and symmetric.   Psychiatric:         Behavior: Behavior normal.         Thought Content: Thought content normal.         Judgment: Judgment normal.       Vitals:    02/05/21 1111   BP: 138/88   BP Location: Right arm   Pulse: 88   Resp: 16   Temp: 96.8 °F (36 °C)   SpO2: 99%   Weight: 97.1 kg (214 lb)   Height: 169 cm (66.54\")         Assessment/Plan   Diagnoses and all orders for this visit:    1. Vertigo (Primary)  -     Cancel: Ambulatory Referral to Physical Therapy Vestibular  -     methylPREDNISolone (Medrol) 4 MG dose pack; Take as directed on package instructions.  Dispense: 21 each; Refill: 0  -     ondansetron (Zofran) 4 MG tablet; Take 1 tablet by mouth Every 8 (Eight) Hours As Needed for Nausea or Vomiting.  Dispense: 45 tablet; Refill: 0  -     Ambulatory Referral to ENT (Otolaryngology)    2. Nausea  -     ondansetron (Zofran) 4 MG tablet; Take 1 tablet by mouth Every 8 (Eight) Hours As Needed for Nausea or Vomiting.  " Dispense: 45 tablet; Refill: 0  -     Ambulatory Referral to ENT (Otolaryngology)    3. Abnormal glucose  -     Hemoglobin A1c; Future      Continue with Meclizine  Medrol as directed  Zofran PRN  ENT will see her today for the Epley Maneuver  A1C sent via labs  RTC soon for PE

## 2021-02-05 NOTE — TELEPHONE ENCOUNTER
Caller: Mariusz Yepez    Relationship: Self    Best call back number: [phone] (H)    Medication needed:   Requested Prescriptions     Pending Prescriptions Disp Refills   • metFORMIN ER (GLUCOPHAGE-XR) 500 MG 24 hr tablet 180 tablet 1     Sig: Take 1 tablet by mouth 2 (Two) Times a Day.       When do you need the refill by: 02/15/21    What details did the patient provide when requesting the medication: patient has two weeks left     Does the patient have less than a 3 day supply:  [] Yes  [] No    What is the patient's preferred pharmacy: [id number] [place] [id number] AT [facility] [phone] [facility] [phone]

## 2021-02-08 ENCOUNTER — OFFICE VISIT (OUTPATIENT)
Dept: INTERNAL MEDICINE | Facility: CLINIC | Age: 46
End: 2021-02-08

## 2021-02-08 ENCOUNTER — TELEPHONE (OUTPATIENT)
Dept: INTERNAL MEDICINE | Facility: CLINIC | Age: 46
End: 2021-02-08

## 2021-02-08 VITALS
HEIGHT: 67 IN | BODY MASS INDEX: 35 KG/M2 | DIASTOLIC BLOOD PRESSURE: 84 MMHG | SYSTOLIC BLOOD PRESSURE: 118 MMHG | OXYGEN SATURATION: 98 % | HEART RATE: 88 BPM | WEIGHT: 223 LBS | TEMPERATURE: 96.9 F

## 2021-02-08 DIAGNOSIS — J30.9 ALLERGIC RHINITIS: ICD-10-CM

## 2021-02-08 DIAGNOSIS — R19.7 DIARRHEA, UNSPECIFIED TYPE: ICD-10-CM

## 2021-02-08 DIAGNOSIS — E66.01 MORBIDLY OBESE (HCC): ICD-10-CM

## 2021-02-08 DIAGNOSIS — I10 BENIGN ESSENTIAL HYPERTENSION: ICD-10-CM

## 2021-02-08 DIAGNOSIS — Z00.00 ROUTINE GENERAL MEDICAL EXAMINATION AT A HEALTH CARE FACILITY: Primary | ICD-10-CM

## 2021-02-08 DIAGNOSIS — E55.9 VITAMIN D DEFICIENCY: ICD-10-CM

## 2021-02-08 DIAGNOSIS — R42 VERTIGO: ICD-10-CM

## 2021-02-08 PROCEDURE — 99396 PREV VISIT EST AGE 40-64: CPT | Performed by: NURSE PRACTITIONER

## 2021-02-08 RX ORDER — PREDNISONE 10 MG/1
TABLET ORAL
COMMUNITY
Start: 2021-02-05 | End: 2021-03-02

## 2021-02-08 RX ORDER — MONTELUKAST SODIUM 10 MG/1
10 TABLET ORAL
Qty: 90 TABLET | Refills: 1 | Status: SHIPPED | OUTPATIENT
Start: 2021-02-08 | End: 2021-07-29

## 2021-02-08 NOTE — PROGRESS NOTES
Subjective   Mariusz Yepez is a 45 y.o. female    Chief Complaint   Patient presents with   • Annual Exam     History of Present Illness     Here for annual exam    Vertigo - was seen here on 2/5/2021 with c/o dizziness.  Was felt to be due to BPVV.  Was referred to ENT and seen later that day.  Epley was unsuccessful.  Was placed on a prolonged steroid taper.  States that sx's are better, but not resolved.  She will f/u with ENT on 2/19/21.    Anemia - chronic; on ferrous sulfate 325 mg daily       Allergies - stable; chronic on Qvar to her Singulair, Albuterol, Flonase, Xyzal, and astelin. Really only taking the Singulair and Flonase; sx's have been really well controlled.  See Allergist every 6 months.  Also has a nebulizer machine that she uses PRN.      HTN - chronic; stable on Coreg 6.25 and HCTZ 12.5 mg daily.  She is tolerating well w/o SE.  Does not monitor BP at home.        Reflux - chronic and stable on Omeprazole QAM and an H2 blocker QHS.       PCOS - Followed by Dr. Hanna; Taking Metformin 500mg BID for sx    Did have to go back on Colestid due to diarrhea.  She has had problems since her GB was removed.        GYN- Dr. Hanna; she is repeated her TV US in Sept - repeat done due to LLQ pain; had a new fibroid, but no other changes; doing Q6 months now.    mamm - 2/17/2020, scheduled for March 2021  last colon - 6/2020 (due 2025)  Pap - 7/2019  DXA none  TdaP 8/07   Flu shot - 12/2019   Hep A - 11/2018, 12/2019  EGD 12/30/13 - esophagitis     Diet - she is cooking more and eating out less    Exercise - was doing much better before the weather became cold    Social History     Tobacco Use   • Smoking status: Never Smoker   • Smokeless tobacco: Never Used   Substance Use Topics   • Alcohol use: Yes     Comment: on ocassion --current some day   • Drug use: No         The following portions of the patient's history were reviewed and updated as appropriate: allergies, current medications, past family  history, past medical history, past social history, past surgical history and problem list.    Current Outpatient Medications:   •  albuterol (PROVENTIL HFA;VENTOLIN HFA) 108 (90 Base) MCG/ACT inhaler, Inhale 2 puffs Every 4 (Four) Hours As Needed for Wheezing., Disp: 1 inhaler, Rfl: 2  •  azelastine (ASTELIN) 0.1 % nasal spray, 2 sprays into the nostril(s) as directed by provider 2 (Two) Times a Day. Use in each nostril as directed, Disp: 1 each, Rfl: 12  •  carvedilol (COREG) 6.25 MG tablet, TAKE 1 TABLET BY MOUTH TWICE DAILY WITH MEALS, Disp: 180 tablet, Rfl: 1  •  Cholecalciferol (VITAMIN D3) 2000 UNITS tablet, Take  by mouth., Disp: , Rfl:   •  FeroSul 325 (65 Fe) MG tablet, TAKE 1 TABLET BY MOUTH TWICE DAILY WITH FOOD, Disp: 180 tablet, Rfl: 1  •  fluticasone (FLONASE) 50 MCG/ACT nasal spray, INSTILL 2 SPRAYS IN EACH NOSTRIL AS DIRECTED BY PROVIDER DAILY, Disp: 48 g, Rfl: 2  •  hydroCHLOROthiazide (HYDRODIURIL) 12.5 MG tablet, TAKE 1 TABLET BY MOUTH EVERY DAY, Disp: 90 tablet, Rfl: 1  •  levocetirizine (XYZAL) 5 MG tablet, TAKE 1 TABLET BY MOUTH EVERY EVENING, Disp: 90 tablet, Rfl: 1  •  Lo Loestrin Fe 1 MG-10 MCG / 10 MCG tablet, Take 1 tablet by mouth Daily for 336 days. 1 po qd, Disp: 84 tablet, Rfl: 3  •  Melatonin 3 MG tablet, Take  by mouth Every Night., Disp: , Rfl:   •  metFORMIN ER (GLUCOPHAGE-XR) 500 MG 24 hr tablet, Take 1 tablet by mouth 2 (Two) Times a Day., Disp: 180 tablet, Rfl: 1  •  montelukast (SINGULAIR) 10 MG tablet, TAKE 1 TABLET BY MOUTH EVERY NIGHT AT BEDTIME, Disp: 90 tablet, Rfl: 1  •  Omega-3 Fatty Acids (FISH OIL) 1200 MG capsule capsule, Take 1,200 mg by mouth Daily., Disp: , Rfl:   •  omeprazole (priLOSEC) 40 MG capsule, TAKE 1 CAPSULE BY MOUTH TWICE DAILY, Disp: 180 capsule, Rfl: 1  •  ondansetron (Zofran) 4 MG tablet, Take 1 tablet by mouth Every 8 (Eight) Hours As Needed for Nausea or Vomiting., Disp: 18 tablet, Rfl: 0  •  predniSONE (DELTASONE) 10 MG tablet, TAKE 4 TABLETS ON  DAYS 1-3 THEN 3 EVERY DAY ON DAYS 4-5 THEN 2 EVERY DAY ON DAYS 6-7 AND 1 ON DAYS 8-9, Disp: , Rfl:   •  QVAR REDIHALER 80 MCG/ACT inhaler, INHALE 1 PUFF BY MOUTH TWICE DAILY, Disp: 10.6 g, Rfl: 5     Review of Systems   Constitutional: Negative for appetite change, chills, fatigue, fever and unexpected weight change.   HENT: Negative for congestion, ear pain, nosebleeds, rhinorrhea and tinnitus.    Eyes: Negative for pain.   Respiratory: Negative for cough, chest tightness and shortness of breath.    Cardiovascular: Negative for chest pain, palpitations and leg swelling.   Gastrointestinal: Negative for abdominal distention, abdominal pain, blood in stool, constipation, diarrhea, nausea and vomiting.   Endocrine: Negative for cold intolerance, heat intolerance, polydipsia, polyphagia and polyuria.   Genitourinary: Negative for dysuria, flank pain, frequency, hematuria and urgency.   Musculoskeletal: Negative for arthralgias, back pain, gait problem, joint swelling, myalgias and neck pain.   Skin: Negative for color change, pallor, rash and wound.   Allergic/Immunologic: Negative for environmental allergies and food allergies.   Neurological: Positive for dizziness and light-headedness. Negative for syncope, weakness, numbness and headaches.   Hematological: Negative for adenopathy. Does not bruise/bleed easily.   Psychiatric/Behavioral: Negative for behavioral problems and suicidal ideas. The patient is not nervous/anxious.        Objective   Physical Exam  Vitals signs reviewed.   Constitutional:       Appearance: Normal appearance. She is well-developed and normal weight.   HENT:      Head: Normocephalic and atraumatic.      Right Ear: External ear normal.      Left Ear: External ear normal.      Nose: Nose normal.      Mouth/Throat:      Mouth: Mucous membranes are moist.      Pharynx: Oropharynx is clear.   Eyes:      Conjunctiva/sclera: Conjunctivae normal.      Pupils: Pupils are equal, round, and reactive to  "light.   Neck:      Musculoskeletal: Normal range of motion and neck supple.   Cardiovascular:      Rate and Rhythm: Normal rate and regular rhythm.      Pulses: Normal pulses.           Carotid pulses are 2+ on the right side and 2+ on the left side.     Heart sounds: Normal heart sounds.   Pulmonary:      Effort: Pulmonary effort is normal.      Breath sounds: Normal breath sounds.   Abdominal:      General: Bowel sounds are normal.      Palpations: Abdomen is soft.   Genitourinary:     Pubic Area: No rash.       Vagina: Normal.      Cervix: Normal.      Uterus: Normal.       Adnexa: Right adnexa normal and left adnexa normal.   Musculoskeletal: Normal range of motion.   Lymphadenopathy:      Head:      Right side of head: No tonsillar adenopathy.      Left side of head: No tonsillar adenopathy.      Cervical:      Right cervical: No superficial or posterior cervical adenopathy.     Left cervical: No superficial or posterior cervical adenopathy.   Skin:     General: Skin is warm and dry.      Capillary Refill: Capillary refill takes less than 2 seconds.   Neurological:      Mental Status: She is alert and oriented to person, place, and time.      Deep Tendon Reflexes: Reflexes are normal and symmetric.   Psychiatric:         Mood and Affect: Mood normal.         Behavior: Behavior normal.         Thought Content: Thought content normal.         Judgment: Judgment normal.       Vitals:    02/08/21 1311   BP: 118/84   Pulse: 88   Temp: 96.9 °F (36.1 °C)   TempSrc: Temporal   SpO2: 98%   Weight: 101 kg (223 lb)   Height: 169 cm (66.54\")         Assessment/Plan   Diagnoses and all orders for this visit:    1. Routine general medical examination at a health care facility (Primary)  -     CBC & Differential; Future  -     Comprehensive Metabolic Panel; Future  -     Lipid Panel; Future  -     TSH; Future  -     Vitamin B12; Future  -     Vitamin D 25 Hydroxy; Future    2. Benign essential hypertension  -     CBC & " Differential; Future  -     Comprehensive Metabolic Panel; Future  -     Lipid Panel; Future  -     TSH; Future  -     Vitamin B12; Future  -     Vitamin D 25 Hydroxy; Future    3. Vertigo  -     CBC & Differential; Future  -     Comprehensive Metabolic Panel; Future  -     Lipid Panel; Future  -     TSH; Future  -     Vitamin B12; Future  -     Vitamin D 25 Hydroxy; Future    4. Morbidly obese (CMS/HCC)  -     CBC & Differential; Future  -     Comprehensive Metabolic Panel; Future  -     Lipid Panel; Future  -     TSH; Future  -     Vitamin B12; Future  -     Vitamin D 25 Hydroxy; Future    5. Diarrhea, unspecified type  -     CBC & Differential; Future  -     Comprehensive Metabolic Panel; Future  -     Lipid Panel; Future  -     TSH; Future  -     Vitamin B12; Future  -     Vitamin D 25 Hydroxy; Future    6. Vitamin D deficiency  -     CBC & Differential; Future  -     Comprehensive Metabolic Panel; Future  -     Lipid Panel; Future  -     TSH; Future  -     Vitamin B12; Future  -     Vitamin D 25 Hydroxy; Future      Labs sent  Patient to continue to follow with ENT for vertigo  If symptoms or not improving I would suggest an MRI of the brain  No medication changes today  Pap and breast exam up-to-date per GYN  Mammogram up-to-date  Counseling-diet and exercise  Return in about 6 months (around 8/8/2021).

## 2021-02-08 NOTE — TELEPHONE ENCOUNTER
Last Office Visit: 02/08/2021  Next Office Visit: none scheduled     Labs completed in past 6 months? yes  Labs completed in past year? yes    Last Refill Date: 08/31/2020  Quantity: 90  Refills: 1    Pharmacy:   [id number] - [place], [place] - [facility] [address] AT [facility] - [phone]  - [phone] FX   Phone:  [phone]   Fax:  [phone]   [address] [address] [facility] [address]

## 2021-02-17 ENCOUNTER — TELEPHONE (OUTPATIENT)
Dept: INTERNAL MEDICINE | Facility: CLINIC | Age: 46
End: 2021-02-17

## 2021-02-18 NOTE — TELEPHONE ENCOUNTER
----- Message from DAVIN Jo sent at 2/17/2021 10:47 PM EST -----  Pt notified of normal A1C at appt on 2/8/21

## 2021-02-22 DIAGNOSIS — J30.9 ALLERGIC RHINITIS: ICD-10-CM

## 2021-02-22 RX ORDER — AZELASTINE 1 MG/ML
SPRAY, METERED NASAL
Qty: 30 ML | Refills: 11 | Status: SHIPPED | OUTPATIENT
Start: 2021-02-22

## 2021-02-22 NOTE — TELEPHONE ENCOUNTER
Last Office Visit: 02/08/2021  Next Office Visit: none     Labs completed in past 6 months? no  Labs completed in past year? yes    Last Refill Date: 01/31/2020  Quantity: 1  Refills: 12    Pharmacy:[id number] [id number] AT [facility] & [address] [facility] [phone] [facility] [phone]

## 2021-03-02 ENCOUNTER — OFFICE VISIT (OUTPATIENT)
Dept: INTERNAL MEDICINE | Facility: CLINIC | Age: 46
End: 2021-03-02

## 2021-03-02 VITALS
DIASTOLIC BLOOD PRESSURE: 80 MMHG | HEIGHT: 67 IN | TEMPERATURE: 97.5 F | SYSTOLIC BLOOD PRESSURE: 120 MMHG | HEART RATE: 97 BPM | RESPIRATION RATE: 16 BRPM | WEIGHT: 225.6 LBS | BODY MASS INDEX: 35.41 KG/M2 | OXYGEN SATURATION: 98 %

## 2021-03-02 DIAGNOSIS — G50.0 TRIGEMINAL NEURALGIA OF LEFT SIDE OF FACE: Primary | ICD-10-CM

## 2021-03-02 DIAGNOSIS — R42 VERTIGO: ICD-10-CM

## 2021-03-02 DIAGNOSIS — G50.0 TRIGEMINAL NEURALGIA OF LEFT SIDE OF FACE: ICD-10-CM

## 2021-03-02 PROCEDURE — 99214 OFFICE O/P EST MOD 30 MIN: CPT | Performed by: NURSE PRACTITIONER

## 2021-03-02 RX ORDER — CARBAMAZEPINE 200 MG/1
100 TABLET ORAL 2 TIMES DAILY
Qty: 30 TABLET | Refills: 1 | Status: SHIPPED | OUTPATIENT
Start: 2021-03-02 | End: 2021-03-16 | Stop reason: SDUPTHER

## 2021-03-02 NOTE — PROGRESS NOTES
Subjective   Mariusz Yepez is a 45 y.o. female    Chief Complaint   Patient presents with   • Tingling and tightness/spasm of left cheek     History of Present Illness     Pt presents with c/o left facial N/T, burning and pain that occurs intermittently.  No rash or lesion.  No swelling or redness.  No injury or trauma.  No facial drooping.  No eye involvement or vision changes. No other associated sx's.      She is still struggling with intermittent vertigo.  She is seeing ENT and they are planning on a vestibular work-up.      The following portions of the patient's history were reviewed and updated as appropriate: allergies, current medications, past family history, past medical history, past social history, past surgical history and problem list.    Current Outpatient Medications:   •  albuterol (PROVENTIL HFA;VENTOLIN HFA) 108 (90 Base) MCG/ACT inhaler, Inhale 2 puffs Every 4 (Four) Hours As Needed for Wheezing., Disp: 1 inhaler, Rfl: 2  •  azelastine (ASTELIN) 0.1 % nasal spray, INSTILL 2 SPRAYS IN EACH NOSTRIL AS DIRECTED BY PROVIDER TWICE DAILY, Disp: 30 mL, Rfl: 11  •  carBAMazepine (TEGretol) 200 MG tablet, Take 0.5 tablets by mouth 2 (Two) Times a Day., Disp: 30 tablet, Rfl: 1  •  carvedilol (COREG) 6.25 MG tablet, TAKE 1 TABLET BY MOUTH TWICE DAILY WITH MEALS, Disp: 180 tablet, Rfl: 1  •  Cholecalciferol (VITAMIN D3) 2000 UNITS tablet, Take  by mouth., Disp: , Rfl:   •  FeroSul 325 (65 Fe) MG tablet, TAKE 1 TABLET BY MOUTH TWICE DAILY WITH FOOD, Disp: 180 tablet, Rfl: 1  •  fluticasone (FLONASE) 50 MCG/ACT nasal spray, INSTILL 2 SPRAYS IN EACH NOSTRIL AS DIRECTED BY PROVIDER DAILY, Disp: 48 g, Rfl: 2  •  hydroCHLOROthiazide (HYDRODIURIL) 12.5 MG tablet, TAKE 1 TABLET BY MOUTH EVERY DAY, Disp: 90 tablet, Rfl: 1  •  levocetirizine (XYZAL) 5 MG tablet, TAKE 1 TABLET BY MOUTH EVERY EVENING, Disp: 90 tablet, Rfl: 1  •  Lo Loestrin Fe 1 MG-10 MCG / 10 MCG tablet, Take 1 tablet by mouth Daily for 336  days. 1 po qd, Disp: 84 tablet, Rfl: 3  •  Melatonin 3 MG tablet, Take  by mouth Every Night., Disp: , Rfl:   •  metFORMIN ER (GLUCOPHAGE-XR) 500 MG 24 hr tablet, Take 1 tablet by mouth 2 (Two) Times a Day., Disp: 180 tablet, Rfl: 1  •  montelukast (SINGULAIR) 10 MG tablet, Take 1 tablet by mouth every night at bedtime., Disp: 90 tablet, Rfl: 1  •  Omega-3 Fatty Acids (FISH OIL) 1200 MG capsule capsule, Take 1,200 mg by mouth Daily., Disp: , Rfl:   •  omeprazole (priLOSEC) 40 MG capsule, TAKE 1 CAPSULE BY MOUTH TWICE DAILY, Disp: 180 capsule, Rfl: 1  •  QVAR REDIHALER 80 MCG/ACT inhaler, INHALE 1 PUFF BY MOUTH TWICE DAILY, Disp: 10.6 g, Rfl: 5     Review of Systems   Constitutional: Negative for chills, fatigue and fever.   HENT: Negative for facial swelling.         Left facial pain   Respiratory: Negative for cough, chest tightness and shortness of breath.    Cardiovascular: Negative for chest pain.   Gastrointestinal: Negative for abdominal pain, diarrhea, nausea and vomiting.   Endocrine: Negative for cold intolerance and heat intolerance.   Musculoskeletal: Negative for arthralgias.   Neurological: Positive for dizziness and numbness. Negative for tremors, seizures, syncope, facial asymmetry, speech difficulty, weakness, light-headedness and headaches.       Objective   Physical Exam  Constitutional:       Appearance: She is well-developed.   HENT:      Head: Normocephalic and atraumatic.   Eyes:      Conjunctiva/sclera: Conjunctivae normal.      Pupils: Pupils are equal, round, and reactive to light.   Neck:      Musculoskeletal: Normal range of motion.   Cardiovascular:      Rate and Rhythm: Normal rate and regular rhythm.      Heart sounds: Normal heart sounds.   Pulmonary:      Effort: Pulmonary effort is normal.      Breath sounds: Normal breath sounds.   Abdominal:      General: Bowel sounds are normal.      Palpations: Abdomen is soft.   Musculoskeletal: Normal range of motion.   Skin:     General: Skin  "is warm and dry.   Neurological:      Mental Status: She is alert and oriented to person, place, and time.      GCS: GCS eye subscore is 4. GCS verbal subscore is 5. GCS motor subscore is 6.      Cranial Nerves: Cranial nerves are intact.      Sensory: Sensation is intact.      Motor: Motor function is intact.      Coordination: Coordination is intact.      Gait: Gait is intact.      Deep Tendon Reflexes: Reflexes are normal and symmetric.      Comments: Left trigeminal nerve with tenderness to palpation   Psychiatric:         Behavior: Behavior normal.         Thought Content: Thought content normal.         Judgment: Judgment normal.       Vitals:    03/02/21 1608   BP: 120/80   Pulse: 97   Resp: 16   Temp: 97.5 °F (36.4 °C)   TempSrc: Infrared   SpO2: 98%   Weight: 102 kg (225 lb 9.6 oz)   Height: 169 cm (66.54\")         Assessment/Plan   Diagnoses and all orders for this visit:    1. Trigeminal neuralgia of left side of face (Primary)  -     MRI Brain With & Without Contrast; Future  -     carBAMazepine (TEGretol) 200 MG tablet; Take 0.5 tablets by mouth 2 (Two) Times a Day.  Dispense: 30 tablet; Refill: 1    2. Vertigo  -     MRI Brain With & Without Contrast; Future      Discussed with pt the dx of trigeminal neuralgia at length  We will set up for an MRI of the brain to rule out any other underlying cause  Tegretol as directed, may need to increase  Return in about 2 weeks (around 3/16/2021).           "

## 2021-03-03 RX ORDER — CARBAMAZEPINE 200 MG/1
TABLET ORAL
Qty: 90 TABLET | OUTPATIENT
Start: 2021-03-03

## 2021-03-08 ENCOUNTER — TELEPHONE (OUTPATIENT)
Dept: INTERNAL MEDICINE | Facility: CLINIC | Age: 46
End: 2021-03-08

## 2021-03-08 NOTE — TELEPHONE ENCOUNTER
TIMOTHY- WITH [facility] CALL TO FOLLOW UP ON A PRE AUTHORIZATION SUBMISSION FOR MRI. FOLLOWING UP FOR PATIJULIUST.     REQUESTED IF THIS HAS NOT BEEN SUBMITTED PLEASE SUBMIT TO:   [facility] PH:  [phone]  OR IF ACCESS TO [facility] PROVIDER PORTAL MAY SUBMIT THAT WAY, IF YOU PREFER.

## 2021-03-09 ENCOUNTER — APPOINTMENT (OUTPATIENT)
Dept: GENERAL RADIOLOGY | Facility: HOSPITAL | Age: 46
End: 2021-03-09

## 2021-03-09 ENCOUNTER — TELEPHONE (OUTPATIENT)
Dept: INTERNAL MEDICINE | Facility: CLINIC | Age: 46
End: 2021-03-09

## 2021-03-09 ENCOUNTER — HOSPITAL ENCOUNTER (EMERGENCY)
Facility: HOSPITAL | Age: 46
Discharge: HOME | End: 2021-03-09
Attending: EMERGENCY MEDICINE | Admitting: EMERGENCY MEDICINE

## 2021-03-09 ENCOUNTER — APPOINTMENT (OUTPATIENT)
Dept: MRI IMAGING | Facility: HOSPITAL | Age: 46
End: 2021-03-09

## 2021-03-09 VITALS
OXYGEN SATURATION: 98 % | HEIGHT: 60 IN | DIASTOLIC BLOOD PRESSURE: 92 MMHG | BODY MASS INDEX: 44.17 KG/M2 | TEMPERATURE: 98.1 F | HEART RATE: 89 BPM | RESPIRATION RATE: 16 BRPM | SYSTOLIC BLOOD PRESSURE: 123 MMHG | WEIGHT: 225 LBS

## 2021-03-09 DIAGNOSIS — Z92.29 STATUS POST ADMINISTRATION OF ALL DOSES OF COVID-19 VACCINE SERIES: ICD-10-CM

## 2021-03-09 DIAGNOSIS — Z86.69 HISTORY OF TRIGEMINAL NEURALGIA: ICD-10-CM

## 2021-03-09 DIAGNOSIS — R42 DIZZINESS: Primary | ICD-10-CM

## 2021-03-09 DIAGNOSIS — G51.4 FACIAL TWITCHING: ICD-10-CM

## 2021-03-09 LAB
ALBUMIN SERPL-MCNC: 4.2 G/DL (ref 3.5–5.2)
ALBUMIN/GLOB SERPL: 1.3 G/DL
ALP SERPL-CCNC: 54 U/L (ref 39–117)
ALT SERPL W P-5'-P-CCNC: 17 U/L (ref 1–33)
ANION GAP SERPL CALCULATED.3IONS-SCNC: 13 MMOL/L (ref 5–15)
AST SERPL-CCNC: 25 U/L (ref 1–32)
B-HCG UR QL: NEGATIVE
BACTERIA UR QL AUTO: ABNORMAL /HPF
BASOPHILS # BLD AUTO: 0.05 10*3/MM3 (ref 0–0.2)
BASOPHILS NFR BLD AUTO: 0.6 % (ref 0–1.5)
BILIRUB SERPL-MCNC: 0.3 MG/DL (ref 0–1.2)
BILIRUB UR QL STRIP: NEGATIVE
BUN SERPL-MCNC: 15 MG/DL (ref 6–20)
BUN/CREAT SERPL: 20.8 (ref 7–25)
CALCIUM SPEC-SCNC: 9.6 MG/DL (ref 8.6–10.5)
CHLORIDE SERPL-SCNC: 101 MMOL/L (ref 98–107)
CLARITY UR: CLEAR
CO2 SERPL-SCNC: 25 MMOL/L (ref 22–29)
COLOR UR: YELLOW
CREAT SERPL-MCNC: 0.72 MG/DL (ref 0.57–1)
DEPRECATED RDW RBC AUTO: 41.9 FL (ref 37–54)
EOSINOPHIL # BLD AUTO: 0.13 10*3/MM3 (ref 0–0.4)
EOSINOPHIL NFR BLD AUTO: 1.5 % (ref 0.3–6.2)
ERYTHROCYTE [DISTWIDTH] IN BLOOD BY AUTOMATED COUNT: 11.9 % (ref 12.3–15.4)
GFR SERPL CREATININE-BSD FRML MDRD: 107 ML/MIN/1.73
GLOBULIN UR ELPH-MCNC: 3.3 GM/DL
GLUCOSE SERPL-MCNC: 163 MG/DL (ref 65–99)
GLUCOSE UR STRIP-MCNC: NEGATIVE MG/DL
HCT VFR BLD AUTO: 41.5 % (ref 34–46.6)
HGB BLD-MCNC: 13.6 G/DL (ref 12–15.9)
HGB UR QL STRIP.AUTO: NEGATIVE
HOLD SPECIMEN: NORMAL
HYALINE CASTS UR QL AUTO: ABNORMAL /LPF
IMM GRANULOCYTES # BLD AUTO: 0.02 10*3/MM3 (ref 0–0.05)
IMM GRANULOCYTES NFR BLD AUTO: 0.2 % (ref 0–0.5)
INTERNAL NEGATIVE CONTROL: NORMAL
INTERNAL POSITIVE CONTROL: NORMAL
KETONES UR QL STRIP: NEGATIVE
LEUKOCYTE ESTERASE UR QL STRIP.AUTO: ABNORMAL
LYMPHOCYTES # BLD AUTO: 2.96 10*3/MM3 (ref 0.7–3.1)
LYMPHOCYTES NFR BLD AUTO: 34.3 % (ref 19.6–45.3)
Lab: NORMAL
MAGNESIUM SERPL-MCNC: 1.8 MG/DL (ref 1.6–2.6)
MCH RBC QN AUTO: 31 PG (ref 26.6–33)
MCHC RBC AUTO-ENTMCNC: 32.8 G/DL (ref 31.5–35.7)
MCV RBC AUTO: 94.5 FL (ref 79–97)
MONOCYTES # BLD AUTO: 0.45 10*3/MM3 (ref 0.1–0.9)
MONOCYTES NFR BLD AUTO: 5.2 % (ref 5–12)
NEUTROPHILS NFR BLD AUTO: 5.01 10*3/MM3 (ref 1.7–7)
NEUTROPHILS NFR BLD AUTO: 58.2 % (ref 42.7–76)
NITRITE UR QL STRIP: NEGATIVE
NRBC BLD AUTO-RTO: 0 /100 WBC (ref 0–0.2)
PH UR STRIP.AUTO: <=5 [PH] (ref 5–8)
PLATELET # BLD AUTO: 304 10*3/MM3 (ref 140–450)
PMV BLD AUTO: 9.2 FL (ref 6–12)
POTASSIUM SERPL-SCNC: 3.5 MMOL/L (ref 3.5–5.2)
PROT SERPL-MCNC: 7.5 G/DL (ref 6–8.5)
PROT UR QL STRIP: NEGATIVE
RBC # BLD AUTO: 4.39 10*6/MM3 (ref 3.77–5.28)
RBC # UR: ABNORMAL /HPF
REF LAB TEST METHOD: ABNORMAL
SODIUM SERPL-SCNC: 139 MMOL/L (ref 136–145)
SP GR UR STRIP: 1.01 (ref 1–1.03)
SQUAMOUS #/AREA URNS HPF: ABNORMAL /HPF
TROPONIN T SERPL-MCNC: <0.01 NG/ML (ref 0–0.03)
UROBILINOGEN UR QL STRIP: ABNORMAL
WBC # BLD AUTO: 8.62 10*3/MM3 (ref 3.4–10.8)
WBC UR QL AUTO: ABNORMAL /HPF
WHOLE BLOOD HOLD SPECIMEN: NORMAL
WHOLE BLOOD HOLD SPECIMEN: NORMAL

## 2021-03-09 PROCEDURE — 70551 MRI BRAIN STEM W/O DYE: CPT

## 2021-03-09 PROCEDURE — 81025 URINE PREGNANCY TEST: CPT | Performed by: EMERGENCY MEDICINE

## 2021-03-09 PROCEDURE — 93005 ELECTROCARDIOGRAM TRACING: CPT

## 2021-03-09 PROCEDURE — 83735 ASSAY OF MAGNESIUM: CPT | Performed by: EMERGENCY MEDICINE

## 2021-03-09 PROCEDURE — 80053 COMPREHEN METABOLIC PANEL: CPT | Performed by: EMERGENCY MEDICINE

## 2021-03-09 PROCEDURE — 71045 X-RAY EXAM CHEST 1 VIEW: CPT

## 2021-03-09 PROCEDURE — 81001 URINALYSIS AUTO W/SCOPE: CPT | Performed by: EMERGENCY MEDICINE

## 2021-03-09 PROCEDURE — 85025 COMPLETE CBC W/AUTO DIFF WBC: CPT | Performed by: EMERGENCY MEDICINE

## 2021-03-09 PROCEDURE — 93005 ELECTROCARDIOGRAM TRACING: CPT | Performed by: EMERGENCY MEDICINE

## 2021-03-09 PROCEDURE — 84484 ASSAY OF TROPONIN QUANT: CPT | Performed by: EMERGENCY MEDICINE

## 2021-03-09 PROCEDURE — 99285 EMERGENCY DEPT VISIT HI MDM: CPT

## 2021-03-09 RX ORDER — LORAZEPAM 1 MG/1
1 TABLET ORAL ONCE
Status: COMPLETED | OUTPATIENT
Start: 2021-03-09 | End: 2021-03-09

## 2021-03-09 RX ORDER — SODIUM CHLORIDE 0.9 % (FLUSH) 0.9 %
10 SYRINGE (ML) INJECTION AS NEEDED
Status: DISCONTINUED | OUTPATIENT
Start: 2021-03-09 | End: 2021-03-10 | Stop reason: HOSPADM

## 2021-03-09 RX ADMIN — LORAZEPAM 1 MG: 1 TABLET ORAL at 21:03

## 2021-03-09 NOTE — TELEPHONE ENCOUNTER
Patient calls upset that her MRI has not been scheduled yet. The policy was explained to patient and the number to central scheduling was given to patient. Patient states that she doesn't understand why this is taking so long. Again this is a routine order that was placed and not a stat or emergency. I offered to contact central scheduling for patient and she stated she will call herself that she would rather be the point of contact.  was present during conversation. Patient said thank you and hung up.

## 2021-03-10 ENCOUNTER — TELEPHONE (OUTPATIENT)
Dept: NEUROLOGY | Facility: CLINIC | Age: 46
End: 2021-03-10

## 2021-03-10 NOTE — DISCHARGE INSTRUCTIONS
Symptomatic care is recommended. Take all medications as prescribed and instructed. Follow up with your primary care and neurology as directed or return to Emergency Department with worsening of symptoms.

## 2021-03-10 NOTE — TELEPHONE ENCOUNTER
Hospital Follow-Up     Patient Name: ISRAEL MERA  : 1975  Caller: SELF    New or established patient: NEW  Date of discharge: 3-  Facility discharged from:  [facility] ED  Diagnosis/Symptoms: DIZZINESS, FACIAL TWITCHING, History of trigeminal neuralgia    Length of stay (If applicable): 4HRS    Follow up:Schedule an appointment with Jennie Curiel MD (Neurology)    PLEASE ADVISE SINCE  DOES NOT SEE PT'S IN THE PRACTICE.    Best call back number: [phone]

## 2021-03-10 NOTE — TELEPHONE ENCOUNTER
I CALLED PT UNABLE TO SPEAK WITH THE PT, LVM FOR PT TO CALL BACK AND SCHEDULE A HOSP F/U APPT WITH DR. SHANNON NEXT AVAILABLE APPT

## 2021-03-11 ENCOUNTER — APPOINTMENT (OUTPATIENT)
Dept: MAMMOGRAPHY | Facility: HOSPITAL | Age: 46
End: 2021-03-11

## 2021-03-11 DIAGNOSIS — R20.0 LEFT FACIAL NUMBNESS: Primary | ICD-10-CM

## 2021-03-11 DIAGNOSIS — R51.9 LEFT FACIAL PAIN: ICD-10-CM

## 2021-03-12 LAB
QT INTERVAL: 346 MS
QTC INTERVAL: 425 MS

## 2021-03-14 ENCOUNTER — APPOINTMENT (OUTPATIENT)
Dept: MRI IMAGING | Facility: HOSPITAL | Age: 46
End: 2021-03-14

## 2021-03-16 ENCOUNTER — OFFICE VISIT (OUTPATIENT)
Dept: INTERNAL MEDICINE | Facility: CLINIC | Age: 46
End: 2021-03-16

## 2021-03-16 ENCOUNTER — LAB (OUTPATIENT)
Dept: LAB | Facility: HOSPITAL | Age: 46
End: 2021-03-16

## 2021-03-16 VITALS
HEIGHT: 60 IN | DIASTOLIC BLOOD PRESSURE: 80 MMHG | BODY MASS INDEX: 44.92 KG/M2 | RESPIRATION RATE: 16 BRPM | OXYGEN SATURATION: 98 % | TEMPERATURE: 97.1 F | SYSTOLIC BLOOD PRESSURE: 128 MMHG | WEIGHT: 228.8 LBS | HEART RATE: 90 BPM

## 2021-03-16 DIAGNOSIS — G50.0 TRIGEMINAL NEURALGIA OF LEFT SIDE OF FACE: ICD-10-CM

## 2021-03-16 DIAGNOSIS — R51.9 LEFT FACIAL PAIN: ICD-10-CM

## 2021-03-16 DIAGNOSIS — Z87.898 HISTORY OF MOTION SICKNESS: ICD-10-CM

## 2021-03-16 DIAGNOSIS — R19.7 DIARRHEA, UNSPECIFIED TYPE: ICD-10-CM

## 2021-03-16 DIAGNOSIS — Z00.00 ROUTINE GENERAL MEDICAL EXAMINATION AT A HEALTH CARE FACILITY: ICD-10-CM

## 2021-03-16 DIAGNOSIS — E55.9 VITAMIN D DEFICIENCY: ICD-10-CM

## 2021-03-16 DIAGNOSIS — R42 VERTIGO: ICD-10-CM

## 2021-03-16 DIAGNOSIS — E66.01 MORBIDLY OBESE (HCC): ICD-10-CM

## 2021-03-16 DIAGNOSIS — R42 DIZZINESS: Primary | ICD-10-CM

## 2021-03-16 DIAGNOSIS — I10 BENIGN ESSENTIAL HYPERTENSION: ICD-10-CM

## 2021-03-16 DIAGNOSIS — R42 DIZZINESS: ICD-10-CM

## 2021-03-16 LAB
25(OH)D3 SERPL-MCNC: 49.9 NG/ML
CHOLEST SERPL-MCNC: 216 MG/DL (ref 0–200)
CRP SERPL-MCNC: 3.69 MG/DL (ref 0–0.5)
ERYTHROCYTE [SEDIMENTATION RATE] IN BLOOD: 49 MM/HR (ref 0–20)
HDLC SERPL-MCNC: 59 MG/DL (ref 40–60)
LDLC SERPL CALC-MCNC: 140 MG/DL (ref 0–100)
LDLC/HDLC SERPL: 2.34 {RATIO}
TRIGL SERPL-MCNC: 96 MG/DL (ref 0–150)
TSH SERPL DL<=0.05 MIU/L-ACNC: 1.87 UIU/ML (ref 0.27–4.2)
VIT B12 BLD-MCNC: 252 PG/ML (ref 211–946)
VLDLC SERPL-MCNC: 17 MG/DL (ref 5–40)

## 2021-03-16 PROCEDURE — 80061 LIPID PANEL: CPT | Performed by: NURSE PRACTITIONER

## 2021-03-16 PROCEDURE — 82306 VITAMIN D 25 HYDROXY: CPT | Performed by: NURSE PRACTITIONER

## 2021-03-16 PROCEDURE — 86140 C-REACTIVE PROTEIN: CPT | Performed by: NURSE PRACTITIONER

## 2021-03-16 PROCEDURE — 99214 OFFICE O/P EST MOD 30 MIN: CPT | Performed by: NURSE PRACTITIONER

## 2021-03-16 PROCEDURE — 86038 ANTINUCLEAR ANTIBODIES: CPT | Performed by: NURSE PRACTITIONER

## 2021-03-16 PROCEDURE — 84443 ASSAY THYROID STIM HORMONE: CPT | Performed by: NURSE PRACTITIONER

## 2021-03-16 PROCEDURE — 85652 RBC SED RATE AUTOMATED: CPT | Performed by: NURSE PRACTITIONER

## 2021-03-16 PROCEDURE — 36415 COLL VENOUS BLD VENIPUNCTURE: CPT

## 2021-03-16 PROCEDURE — 82607 VITAMIN B-12: CPT | Performed by: NURSE PRACTITIONER

## 2021-03-16 RX ORDER — CARBAMAZEPINE 200 MG/1
200 TABLET ORAL 2 TIMES DAILY
Qty: 60 TABLET | Refills: 2 | Status: SHIPPED | OUTPATIENT
Start: 2021-03-16 | End: 2021-05-17

## 2021-03-16 RX ORDER — SCOLOPAMINE TRANSDERMAL SYSTEM 1 MG/1
1 PATCH, EXTENDED RELEASE TRANSDERMAL
Qty: 10 EACH | Refills: 1 | Status: SHIPPED | OUTPATIENT
Start: 2021-03-16 | End: 2021-05-17

## 2021-03-16 NOTE — PROGRESS NOTES
Subjective   Mariusz Yepez is a 45 y.o. female    Chief Complaint   Patient presents with   • 2 week follow up   • Trigeminal Neuralgia   • Dizziness     History of Present Illness     Pt was seen on 3/2/2021 with c/o left facial N/T, burning and pain that occurs intermittently.  No rash or lesion.  No swelling or redness.  No injury or trauma.  No facial drooping. No eye involvement or vision changes. No other associated sx's.  I felt sx's were more than likely r/t trigeminal neuralgia.  I started her on Carbamazepine 100 mg BID and ordered an MRI.      Pt messaged me on 3/9/2021 with worsening sx's.  I advised that she proceed to the ER for quicker work-up and imaging.      She did proceed to the ER.  Labs and urinalysis were without acute abnormalities.  Chest x-ray without acute cardiopulmonary findings.  Normal sinus rhythm on EKG.  MRI of brain demonstrates no acute intracranial abnormalities.    She was advised to f/u and be scheduled to see Neurology.  Neuro referral has been entered.  She cannot see [facility] until June 2020, nor Dr. Shen until 5/2020.    The following portions of the patient's history were reviewed and updated as appropriate: allergies, current medications, past family history, past medical history, past social history, past surgical history and problem list.    Current Outpatient Medications:   •  albuterol (PROVENTIL HFA;VENTOLIN HFA) 108 (90 Base) MCG/ACT inhaler, Inhale 2 puffs Every 4 (Four) Hours As Needed for Wheezing., Disp: 1 inhaler, Rfl: 2  •  azelastine (ASTELIN) 0.1 % nasal spray, INSTILL 2 SPRAYS IN EACH NOSTRIL AS DIRECTED BY PROVIDER TWICE DAILY, Disp: 30 mL, Rfl: 11  •  carBAMazepine (TEGretol) 200 MG tablet, Take 1 tablet by mouth 2 (Two) Times a Day., Disp: 60 tablet, Rfl: 2  •  carvedilol (COREG) 6.25 MG tablet, TAKE 1 TABLET BY MOUTH TWICE DAILY WITH MEALS, Disp: 180 tablet, Rfl: 1  •  Cholecalciferol (VITAMIN D3) 2000 UNITS tablet, Take  by mouth., Disp:  , Rfl:   •  FeroSul 325 (65 Fe) MG tablet, TAKE 1 TABLET BY MOUTH TWICE DAILY WITH FOOD, Disp: 180 tablet, Rfl: 1  •  fluticasone (FLONASE) 50 MCG/ACT nasal spray, INSTILL 2 SPRAYS IN EACH NOSTRIL AS DIRECTED BY PROVIDER DAILY, Disp: 48 g, Rfl: 2  •  hydroCHLOROthiazide (HYDRODIURIL) 12.5 MG tablet, TAKE 1 TABLET BY MOUTH EVERY DAY, Disp: 90 tablet, Rfl: 1  •  levocetirizine (XYZAL) 5 MG tablet, TAKE 1 TABLET BY MOUTH EVERY EVENING, Disp: 90 tablet, Rfl: 1  •  Lo Loestrin Fe 1 MG-10 MCG / 10 MCG tablet, Take 1 tablet by mouth Daily for 336 days. 1 po qd, Disp: 84 tablet, Rfl: 3  •  Melatonin 3 MG tablet, Take  by mouth Every Night., Disp: , Rfl:   •  metFORMIN ER (GLUCOPHAGE-XR) 500 MG 24 hr tablet, Take 1 tablet by mouth 2 (Two) Times a Day., Disp: 180 tablet, Rfl: 1  •  montelukast (SINGULAIR) 10 MG tablet, Take 1 tablet by mouth every night at bedtime., Disp: 90 tablet, Rfl: 1  •  Omega-3 Fatty Acids (FISH OIL) 1200 MG capsule capsule, Take 1,200 mg by mouth Daily., Disp: , Rfl:   •  omeprazole (priLOSEC) 40 MG capsule, TAKE 1 CAPSULE BY MOUTH TWICE DAILY, Disp: 180 capsule, Rfl: 1  •  QVAR REDIHALER 80 MCG/ACT inhaler, INHALE 1 PUFF BY MOUTH TWICE DAILY, Disp: 10.6 g, Rfl: 5  •  Scopolamine (Transderm-Scop, 1.5 MG,) 1.5 MG/3DAYS patch, Place 1 patch on the skin as directed by provider Every 72 (Seventy-Two) Hours., Disp: 10 each, Rfl: 1     Review of Systems   Constitutional: Negative for chills, fatigue and fever.   Respiratory: Negative for cough, chest tightness and shortness of breath.    Cardiovascular: Negative for chest pain.   Gastrointestinal: Negative for abdominal pain, diarrhea, nausea and vomiting.   Endocrine: Negative for cold intolerance and heat intolerance.   Musculoskeletal: Negative for arthralgias.   Neurological: Positive for dizziness and numbness. Negative for facial asymmetry.       Objective   Physical Exam  Constitutional:       Appearance: She is well-developed.   HENT:      Head:  "Normocephalic and atraumatic.   Eyes:      Conjunctiva/sclera: Conjunctivae normal.      Pupils: Pupils are equal, round, and reactive to light.   Cardiovascular:      Rate and Rhythm: Normal rate and regular rhythm.      Heart sounds: Normal heart sounds.   Pulmonary:      Effort: Pulmonary effort is normal.      Breath sounds: Normal breath sounds.   Abdominal:      General: Bowel sounds are normal.      Palpations: Abdomen is soft.   Musculoskeletal:         General: Normal range of motion.      Cervical back: Normal range of motion.   Skin:     General: Skin is warm and dry.   Neurological:      Mental Status: She is alert and oriented to person, place, and time.      GCS: GCS eye subscore is 4. GCS verbal subscore is 5. GCS motor subscore is 6.      Sensory: Sensory deficit (left trigmeninal nerve) present.      Motor: Motor function is intact.      Coordination: Coordination is intact.      Gait: Gait is intact.      Deep Tendon Reflexes: Reflexes are normal and symmetric.   Psychiatric:         Behavior: Behavior normal.         Thought Content: Thought content normal.         Judgment: Judgment normal.       Vitals:    03/16/21 0922   BP: 128/80   Pulse: 90   Resp: 16   Temp: 97.1 °F (36.2 °C)   TempSrc: Infrared   SpO2: 98%   Weight: 104 kg (228 lb 12.8 oz)   Height: 152.4 cm (60\")         Assessment/Plan   Diagnoses and all orders for this visit:    1. Dizziness (Primary)  -     C-reactive Protein; Future  -     Sedimentation Rate; Future  -     COLLIN; Future    2. Left facial pain  -     C-reactive Protein; Future  -     Sedimentation Rate; Future  -     COLLIN; Future    3. Trigeminal neuralgia of left side of face  -     carBAMazepine (TEGretol) 200 MG tablet; Take 1 tablet by mouth 2 (Two) Times a Day.  Dispense: 60 tablet; Refill: 2  -     C-reactive Protein; Future  -     Sedimentation Rate; Future  -     COLLIN; Future    4. History of motion sickness  -     Scopolamine (Transderm-Scop, 1.5 MG,) 1.5 " MG/3DAYS patch; Place 1 patch on the skin as directed by provider Every 72 (Seventy-Two) Hours.  Dispense: 10 each; Refill: 1      All ER records reviewed  Will ck additional labs  Tegretol increased to 200 mg BID  She will message me in 1 week and let me know how her sx's are.

## 2021-03-18 LAB — ANA SER QL: NEGATIVE

## 2021-03-23 ENCOUNTER — TELEPHONE (OUTPATIENT)
Dept: INTERNAL MEDICINE | Facility: CLINIC | Age: 46
End: 2021-03-23

## 2021-03-23 RX ORDER — CYANOCOBALAMIN 1000 UG/ML
INJECTION, SOLUTION INTRAMUSCULAR; SUBCUTANEOUS
Qty: 30 ML | Refills: 5 | Status: SHIPPED | OUTPATIENT
Start: 2021-03-23 | End: 2022-01-27 | Stop reason: SDUPTHER

## 2021-03-23 RX ORDER — NEEDLES, SAFETY 22GX1 1/2"
NEEDLE, DISPOSABLE MISCELLANEOUS
Qty: 50 EACH | Refills: 5 | Status: SHIPPED | OUTPATIENT
Start: 2021-03-23 | End: 2022-05-05

## 2021-03-23 NOTE — TELEPHONE ENCOUNTER
----- Message from DAVIN Jo sent at 3/23/2021  1:00 PM EDT -----  Please let patient know that she does have 2 elevated inflammatory markers.  Can we fax a copy of these labs to Dr. Shen whom she is seeing on Friday.    Her B12 level is low.  I would recommend starting B12 injections based on her symptoms and this number.  I will send this to the pharmacy if she is comfortable giving the injections herself at home.    Cholesterol is slightly elevated.  But her good cholesterol is high.  This is considered cardioprotective.  I would not recommend any medication changes.  Just work on diet, exercise and weight loss.

## 2021-03-23 NOTE — TELEPHONE ENCOUNTER
Called and spoke with patient, informed of lab results at this time. She does want to go ahead with B12 injections at home. Please send prescription to pharmacy. I have faxed over labs to Dr. Shen as recommended. She knows that pharmacy will contact her when prescription is ready for .

## 2021-03-30 ENCOUNTER — APPOINTMENT (OUTPATIENT)
Dept: MAMMOGRAPHY | Facility: HOSPITAL | Age: 46
End: 2021-03-30

## 2021-05-10 ENCOUNTER — TRANSCRIBE ORDERS (OUTPATIENT)
Dept: ADMINISTRATIVE | Facility: HOSPITAL | Age: 46
End: 2021-05-10

## 2021-05-10 DIAGNOSIS — Z12.31 VISIT FOR SCREENING MAMMOGRAM: Primary | ICD-10-CM

## 2021-05-14 ENCOUNTER — HOSPITAL ENCOUNTER (OUTPATIENT)
Dept: MAMMOGRAPHY | Facility: HOSPITAL | Age: 46
Discharge: HOME | End: 2021-05-14
Admitting: OBSTETRICS & GYNECOLOGY

## 2021-05-14 DIAGNOSIS — Z12.31 VISIT FOR SCREENING MAMMOGRAM: ICD-10-CM

## 2021-05-14 PROCEDURE — 77067 SCR MAMMO BI INCL CAD: CPT | Performed by: RADIOLOGY

## 2021-05-14 PROCEDURE — 77063 BREAST TOMOSYNTHESIS BI: CPT

## 2021-05-14 PROCEDURE — 77063 BREAST TOMOSYNTHESIS BI: CPT | Performed by: RADIOLOGY

## 2021-05-14 PROCEDURE — 77067 SCR MAMMO BI INCL CAD: CPT

## 2021-05-17 ENCOUNTER — OFFICE VISIT (OUTPATIENT)
Dept: FAMILY MEDICINE CLINIC | Facility: CLINIC | Age: 46
End: 2021-05-17

## 2021-05-17 VITALS
OXYGEN SATURATION: 99 % | SYSTOLIC BLOOD PRESSURE: 130 MMHG | TEMPERATURE: 98 F | HEART RATE: 95 BPM | HEIGHT: 61 IN | WEIGHT: 224 LBS | DIASTOLIC BLOOD PRESSURE: 90 MMHG | BODY MASS INDEX: 42.29 KG/M2

## 2021-05-17 DIAGNOSIS — J45.20 MILD INTERMITTENT ASTHMA WITHOUT COMPLICATION: Chronic | ICD-10-CM

## 2021-05-17 DIAGNOSIS — R70.0 ESR RAISED: ICD-10-CM

## 2021-05-17 DIAGNOSIS — K21.9 GASTROESOPHAGEAL REFLUX DISEASE, UNSPECIFIED WHETHER ESOPHAGITIS PRESENT: Chronic | ICD-10-CM

## 2021-05-17 DIAGNOSIS — Z00.00 ROUTINE GENERAL MEDICAL EXAMINATION AT A HEALTH CARE FACILITY: ICD-10-CM

## 2021-05-17 DIAGNOSIS — I10 BENIGN ESSENTIAL HYPERTENSION: Primary | ICD-10-CM

## 2021-05-17 DIAGNOSIS — R42 VERTIGO: ICD-10-CM

## 2021-05-17 DIAGNOSIS — E55.9 VITAMIN D DEFICIENCY: ICD-10-CM

## 2021-05-17 DIAGNOSIS — G50.0 TRIGEMINAL NEURALGIA: ICD-10-CM

## 2021-05-17 DIAGNOSIS — E66.01 MORBIDLY OBESE (HCC): ICD-10-CM

## 2021-05-17 DIAGNOSIS — E28.2 POLYCYSTIC OVARIES: Chronic | ICD-10-CM

## 2021-05-17 DIAGNOSIS — R19.7 DIARRHEA, UNSPECIFIED TYPE: ICD-10-CM

## 2021-05-17 DIAGNOSIS — E53.8 VITAMIN B 12 DEFICIENCY: ICD-10-CM

## 2021-05-17 PROCEDURE — 85652 RBC SED RATE AUTOMATED: CPT | Performed by: FAMILY MEDICINE

## 2021-05-17 PROCEDURE — 86140 C-REACTIVE PROTEIN: CPT | Performed by: FAMILY MEDICINE

## 2021-05-17 PROCEDURE — 85025 COMPLETE CBC W/AUTO DIFF WBC: CPT | Performed by: FAMILY MEDICINE

## 2021-05-17 PROCEDURE — 99213 OFFICE O/P EST LOW 20 MIN: CPT | Performed by: FAMILY MEDICINE

## 2021-05-17 PROCEDURE — 82607 VITAMIN B-12: CPT | Performed by: FAMILY MEDICINE

## 2021-05-17 PROCEDURE — 80053 COMPREHEN METABOLIC PANEL: CPT | Performed by: FAMILY MEDICINE

## 2021-05-17 RX ORDER — LAMOTRIGINE 25 MG/1
25 TABLET ORAL 2 TIMES DAILY
Qty: 60 TABLET | Refills: 3
Start: 2021-05-17

## 2021-05-17 NOTE — PROGRESS NOTES
"Subjective   Mariusz Yepez is a 45 y.o. female.     History of Present Illness   After covid vaccine she noticed twitching of left side of face numbness of face and lips numb and nose numb.  She had MRI that was normal.   She is seeing neurology.      She has pcos and takes metformin.      She has been working on losing weight.      She has GI doctor and has had EGD.      Fibroids and colon polyps.   Last c-scope was 6/2020, repeat 5 years.      The following portions of the patient's history were reviewed and updated as appropriate: allergies, current medications, past family history, past medical history, past social history, past surgical history and problem list.    Review of Systems   Constitutional: Negative.    HENT: Negative.    Eyes: Negative.    Respiratory: Negative.    Cardiovascular: Negative.    Gastrointestinal: Negative.    Endocrine: Negative.    Genitourinary: Negative.    Musculoskeletal: Negative.    Skin: Negative.    Allergic/Immunologic: Negative.    Neurological: Positive for dizziness and light-headedness.   Hematological: Negative.    Psychiatric/Behavioral: Negative.    All other systems reviewed and are negative.      Objective     Vitals:    05/17/21 1351   BP: 130/90   Pulse: 95   Temp: 98 °F (36.7 °C)   SpO2: 99%   Weight: 102 kg (224 lb)   Height: 154.9 cm (61\")       Physical Exam  Vitals and nursing note reviewed.   Constitutional:       Appearance: She is well-developed.   HENT:      Head: Normocephalic and atraumatic.   Eyes:      General:         Right eye: No discharge.         Left eye: No discharge.      Pupils: Pupils are equal, round, and reactive to light.   Cardiovascular:      Rate and Rhythm: Normal rate and regular rhythm.      Heart sounds: Normal heart sounds.   Pulmonary:      Effort: Pulmonary effort is normal.      Breath sounds: Normal breath sounds.   Abdominal:      General: Bowel sounds are normal.      Palpations: Abdomen is soft. There is no mass.    "   Tenderness: There is no abdominal tenderness.   Musculoskeletal:         General: Normal range of motion.      Right shoulder: No swelling.      Cervical back: Normal range of motion and neck supple.   Skin:     General: Skin is warm and dry.      Nails: There is no clubbing.   Neurological:      Mental Status: She is alert and oriented to person, place, and time.      Deep Tendon Reflexes: Reflexes are normal and symmetric.   Psychiatric:         Behavior: Behavior normal.         Thought Content: Thought content normal.         Judgment: Judgment normal.         Assessment/Plan     Problem List Items Addressed This Visit        Cardiac and Vasculature    Benign essential hypertension - Primary (Chronic)       ENT    Vertigo       Endocrine and Metabolic    Polycystic ovaries (Chronic)    Vitamin D deficiency    Morbidly obese (CMS/[facility])    Current Assessment & Plan     Patient's (Body mass index is 42.32 kg/m².) indicates that they are morbidly obese (BMI > 40 or > 35 with obesity - related health condition) with obesity-related health conditions that include obstructive sleep apnea and coronary heart disease . Obesity is unchanged. BMI is is above average; BMI management plan is completed. We discussed portion control and increasing exercise.          Vitamin B 12 deficiency    Relevant Orders    Vitamin B12 (Completed)       Gastrointestinal Abdominal     Esophageal reflux (Chronic)    Diarrhea       Health Encounters    Routine general medical examination at a health care facility       Hematology and Neoplasia    ESR raised    Relevant Orders    Sedimentation Rate (Completed)    C-reactive protein (Completed)       Neuro    Trigeminal neuralgia (Chronic)    Relevant Medications    lamoTRIgine (LaMICtal) 25 MG tablet       Pulmonary and Pneumonias    Asthma (Chronic)    Overview     Allergy induced             Body mass index is 42.32 kg/m².

## 2021-05-18 LAB
ALBUMIN SERPL-MCNC: 4.3 G/DL (ref 3.5–5.2)
ALBUMIN/GLOB SERPL: 1.3 G/DL
ALP SERPL-CCNC: 48 U/L (ref 39–117)
ALT SERPL W P-5'-P-CCNC: 20 U/L (ref 1–33)
ANION GAP SERPL CALCULATED.3IONS-SCNC: 12.4 MMOL/L (ref 5–15)
AST SERPL-CCNC: 17 U/L (ref 1–32)
BASOPHILS # BLD AUTO: 0.06 10*3/MM3 (ref 0–0.2)
BASOPHILS NFR BLD AUTO: 0.7 % (ref 0–1.5)
BILIRUB SERPL-MCNC: 0.5 MG/DL (ref 0–1.2)
BUN SERPL-MCNC: 10 MG/DL (ref 6–20)
BUN/CREAT SERPL: 13 (ref 7–25)
CALCIUM SPEC-SCNC: 9.8 MG/DL (ref 8.6–10.5)
CHLORIDE SERPL-SCNC: 101 MMOL/L (ref 98–107)
CO2 SERPL-SCNC: 28.6 MMOL/L (ref 22–29)
CREAT SERPL-MCNC: 0.77 MG/DL (ref 0.57–1)
CRP SERPL-MCNC: 1.44 MG/DL (ref 0–0.5)
DEPRECATED RDW RBC AUTO: 40.9 FL (ref 37–54)
EOSINOPHIL # BLD AUTO: 0.12 10*3/MM3 (ref 0–0.4)
EOSINOPHIL NFR BLD AUTO: 1.3 % (ref 0.3–6.2)
ERYTHROCYTE [DISTWIDTH] IN BLOOD BY AUTOMATED COUNT: 11.8 % (ref 12.3–15.4)
ERYTHROCYTE [SEDIMENTATION RATE] IN BLOOD: 41 MM/HR (ref 0–20)
GFR SERPL CREATININE-BSD FRML MDRD: 99 ML/MIN/1.73
GLOBULIN UR ELPH-MCNC: 3.2 GM/DL
GLUCOSE SERPL-MCNC: 78 MG/DL (ref 65–99)
HCT VFR BLD AUTO: 41.3 % (ref 34–46.6)
HGB BLD-MCNC: 14.2 G/DL (ref 12–15.9)
IMM GRANULOCYTES # BLD AUTO: 0.04 10*3/MM3 (ref 0–0.05)
IMM GRANULOCYTES NFR BLD AUTO: 0.4 % (ref 0–0.5)
LYMPHOCYTES # BLD AUTO: 2.43 10*3/MM3 (ref 0.7–3.1)
LYMPHOCYTES NFR BLD AUTO: 26.4 % (ref 19.6–45.3)
MCH RBC QN AUTO: 32.7 PG (ref 26.6–33)
MCHC RBC AUTO-ENTMCNC: 34.4 G/DL (ref 31.5–35.7)
MCV RBC AUTO: 95.2 FL (ref 79–97)
MONOCYTES # BLD AUTO: 0.49 10*3/MM3 (ref 0.1–0.9)
MONOCYTES NFR BLD AUTO: 5.3 % (ref 5–12)
NEUTROPHILS NFR BLD AUTO: 6.06 10*3/MM3 (ref 1.7–7)
NEUTROPHILS NFR BLD AUTO: 65.9 % (ref 42.7–76)
NRBC BLD AUTO-RTO: 0 /100 WBC (ref 0–0.2)
PLATELET # BLD AUTO: 339 10*3/MM3 (ref 140–450)
PMV BLD AUTO: 10.2 FL (ref 6–12)
POTASSIUM SERPL-SCNC: 3.5 MMOL/L (ref 3.5–5.2)
PROT SERPL-MCNC: 7.5 G/DL (ref 6–8.5)
RBC # BLD AUTO: 4.34 10*6/MM3 (ref 3.77–5.28)
SODIUM SERPL-SCNC: 142 MMOL/L (ref 136–145)
VIT B12 BLD-MCNC: 683 PG/ML (ref 211–946)
WBC # BLD AUTO: 9.2 10*3/MM3 (ref 3.4–10.8)

## 2021-05-18 NOTE — PROGRESS NOTES
The results of the labs that we richie in the office show a significant improvement in the markers of inflammation.  Again this is not a specific indication of anything it is just a generalized marker of inflammation.  The vitamin B12 level has improved so continue taking the B12

## 2021-06-03 ENCOUNTER — TELEPHONE (OUTPATIENT)
Dept: INTERNAL MEDICINE | Facility: CLINIC | Age: 46
End: 2021-06-03

## 2021-06-03 NOTE — TELEPHONE ENCOUNTER
----- Message from Raquel Mon MA sent at 6/3/2021  8:19 AM EDT -----  Called and spoke to patient, she is now seeing Dr. Maki as her PCP.

## 2021-06-08 PROBLEM — R42 VERTIGO: Status: ACTIVE | Noted: 2021-06-08

## 2021-06-08 PROBLEM — E53.8 VITAMIN B 12 DEFICIENCY: Status: ACTIVE | Noted: 2021-06-08

## 2021-06-08 PROBLEM — Z00.00 ROUTINE GENERAL MEDICAL EXAMINATION AT A HEALTH CARE FACILITY: Status: ACTIVE | Noted: 2021-06-08

## 2021-06-08 PROBLEM — R70.0 ESR RAISED: Status: ACTIVE | Noted: 2021-06-08

## 2021-06-08 PROBLEM — R19.7 DIARRHEA: Status: ACTIVE | Noted: 2021-06-08

## 2021-07-28 DIAGNOSIS — K21.9 GASTROESOPHAGEAL REFLUX DISEASE: ICD-10-CM

## 2021-07-28 RX ORDER — OMEPRAZOLE 40 MG/1
CAPSULE, DELAYED RELEASE ORAL
Qty: 180 CAPSULE | Refills: 1 | Status: SHIPPED | OUTPATIENT
Start: 2021-07-28 | End: 2022-02-09

## 2021-07-29 DIAGNOSIS — E28.2 PCOS (POLYCYSTIC OVARIAN SYNDROME): ICD-10-CM

## 2021-07-29 DIAGNOSIS — J30.9 ALLERGIC RHINITIS: ICD-10-CM

## 2021-07-29 DIAGNOSIS — I10 BENIGN ESSENTIAL HYPERTENSION: ICD-10-CM

## 2021-07-29 RX ORDER — CARVEDILOL 6.25 MG/1
TABLET ORAL
Qty: 180 TABLET | Refills: 1 | Status: SHIPPED | OUTPATIENT
Start: 2021-07-29 | End: 2022-01-11

## 2021-07-29 RX ORDER — MONTELUKAST SODIUM 10 MG/1
10 TABLET ORAL
Qty: 90 TABLET | Refills: 1 | Status: SHIPPED | OUTPATIENT
Start: 2021-07-29 | End: 2022-03-24 | Stop reason: SDUPTHER

## 2021-07-29 RX ORDER — METFORMIN HYDROCHLORIDE 500 MG/1
TABLET, EXTENDED RELEASE ORAL
Qty: 180 TABLET | Refills: 1 | Status: SHIPPED | OUTPATIENT
Start: 2021-07-29 | End: 2022-01-26 | Stop reason: SDUPTHER

## 2021-07-29 RX ORDER — HYDROCHLOROTHIAZIDE 12.5 MG/1
TABLET ORAL
Qty: 90 TABLET | Refills: 1 | Status: SHIPPED | OUTPATIENT
Start: 2021-07-29 | End: 2021-11-15

## 2021-07-29 RX ORDER — LEVOCETIRIZINE DIHYDROCHLORIDE 5 MG/1
5 TABLET, FILM COATED ORAL EVERY EVENING
Qty: 90 TABLET | Refills: 1 | Status: SHIPPED | OUTPATIENT
Start: 2021-07-29 | End: 2022-01-26 | Stop reason: SDUPTHER

## 2021-07-29 RX ORDER — FERROUS SULFATE 325(65) MG
TABLET ORAL
Qty: 180 TABLET | Refills: 1 | Status: SHIPPED | OUTPATIENT
Start: 2021-07-29

## 2021-09-13 RX ORDER — NORETHINDRONE ACETATE AND ETHINYL ESTRADIOL, ETHINYL ESTRADIOL AND FERROUS FUMARATE 1MG-10(24)
KIT ORAL
Qty: 84 TABLET | Refills: 3 | Status: SHIPPED | OUTPATIENT
Start: 2021-09-13 | End: 2022-08-26

## 2021-09-27 DIAGNOSIS — D21.9 FIBROIDS: Primary | ICD-10-CM

## 2021-09-28 ENCOUNTER — OFFICE VISIT (OUTPATIENT)
Dept: OBSTETRICS AND GYNECOLOGY | Facility: CLINIC | Age: 46
End: 2021-09-28

## 2021-09-28 VITALS
DIASTOLIC BLOOD PRESSURE: 90 MMHG | WEIGHT: 212 LBS | HEIGHT: 61 IN | BODY MASS INDEX: 40.02 KG/M2 | SYSTOLIC BLOOD PRESSURE: 140 MMHG

## 2021-09-28 DIAGNOSIS — D25.1 INTRAMURAL LEIOMYOMA OF UTERUS: ICD-10-CM

## 2021-09-28 DIAGNOSIS — Z71.85 HPV VACCINE COUNSELING: ICD-10-CM

## 2021-09-28 DIAGNOSIS — Z01.419 WOMEN'S ANNUAL ROUTINE GYNECOLOGICAL EXAMINATION: Primary | ICD-10-CM

## 2021-09-28 DIAGNOSIS — Z12.39 ENCOUNTER FOR BREAST CANCER SCREENING USING NON-MAMMOGRAM MODALITY: ICD-10-CM

## 2021-09-28 PROCEDURE — 99212 OFFICE O/P EST SF 10 MIN: CPT | Performed by: OBSTETRICS & GYNECOLOGY

## 2021-09-28 PROCEDURE — 99396 PREV VISIT EST AGE 40-64: CPT | Performed by: OBSTETRICS & GYNECOLOGY

## 2021-09-28 NOTE — PROGRESS NOTES
GYN Annual Exam     CC - Here for annual exam.     Subjective   HPI  Mariusz Yepez is a 46 y.o. female, , who presents for annual well woman exam. No LMP recorded (lmp unknown). (Menstrual status: Oral contraceptives)..  Periods are absent on OCP. Dysmenorrhea:none.  Partner Status: Marital Status: single.  New Partners since last visit: no.  Desires STD Screening: no.  Patient has not had the HPV vaccine.     F/u ultrasound for fibroids today as well that showed 4cm anterior fibroid and 2cm posterior.  BP today 140/90    Additional OB/GYN History     Current contraception: contraceptive methods: OCP (estrogen/progesterone)  Desires to: continue contraception  Last Pap :   Last Completed Pap Smear          PAP SMEAR (Every 3 Years) Next due on 2019  Done - normal with negative HPV testing    2016  Reason not specified    2012  HM Pap smear              History of abnormal Pap smear: no  Family history of uterine, colon, breast, or ovarian cancer: yes - paternal side, her father  young but several of his first cousins had colon cancer  Previous Mammogram :  yes - ; wnl   Performs monthly Self-Breast Exam: no  Exercises Regularly:no  Feelings of Anxiety or Depression: no  Tobacco Usage?: No   OB History        0    Para   0    Term   0       0    AB   0    Living   0       SAB   0    TAB   0    Ectopic   0    Molar   0    Multiple   0    Live Births   0                Health Maintenance   Topic Date Due   • Pneumococcal Vaccine 0-64 (1 of 2 - PPSV23) Never done   • Annual Gynecologic Pelvic and Breast Exam  2021   • INFLUENZA VACCINE  10/01/2021   • ANNUAL PHYSICAL  2022   • MAMMOGRAM  2022   • PAP SMEAR  2022   • COLORECTAL CANCER SCREENING  2025   • TDAP/TD VACCINES (3 - Td or Tdap) 2027   • HEPATITIS C SCREENING  Completed   • COVID-19 Vaccine  Completed           The additional following portions of the  "patient's history were reviewed and updated as appropriate: allergies, current medications, past family history, past medical history, past social history, past surgical history and problem list.    Review of Systems   Constitutional: Negative.    HENT: Negative.    Eyes: Negative.    Respiratory: Negative.    Cardiovascular: Negative.    Gastrointestinal: Negative.    Endocrine: Negative.    Genitourinary: Negative.    Musculoskeletal: Negative.    Skin: Negative.    Allergic/Immunologic: Negative.    Neurological: Negative.    Hematological: Negative.    Psychiatric/Behavioral: Negative.        I have reviewed and agree with the HPI, ROS, and historical information as entered above. Kadie Barraza MD    Objective   /90   Ht 154.9 cm (61\")   Wt 96.2 kg (212 lb)   LMP  (LMP Unknown)   Breastfeeding No   BMI 40.06 kg/m²     Physical Exam  Vitals and nursing note reviewed. Exam conducted with a chaperone present.   Constitutional:       Appearance: She is well-developed.   HENT:      Head: Normocephalic and atraumatic.   Neck:      Thyroid: No thyroid mass or thyromegaly.   Cardiovascular:      Rate and Rhythm: Normal rate and regular rhythm.      Heart sounds: No murmur heard.     Pulmonary:      Effort: Pulmonary effort is normal. No retractions.      Breath sounds: Normal breath sounds. No wheezing, rhonchi or rales.   Chest:      Chest wall: No mass or tenderness.      Breasts:         Right: Normal. No mass, nipple discharge, skin change or tenderness.         Left: Normal. No mass, nipple discharge, skin change or tenderness.   Abdominal:      General: Bowel sounds are normal.      Palpations: Abdomen is soft. Abdomen is not rigid. There is no mass.      Tenderness: There is no abdominal tenderness. There is no guarding.      Hernia: No hernia is present. There is no hernia in the left inguinal area.   Genitourinary:     Labia:         Right: No rash, tenderness or lesion.         Left: No rash, " tenderness or lesion.       Vagina: Normal. No vaginal discharge or lesions.      Cervix: No cervical motion tenderness, discharge, lesion or cervical bleeding.      Uterus: Normal. Not enlarged, not fixed and not tender.       Adnexa:         Right: No mass or tenderness.          Left: No mass or tenderness.        Rectum: No external hemorrhoid.   Musculoskeletal:      Cervical back: Normal range of motion. No muscular tenderness.   Neurological:      Mental Status: She is alert and oriented to person, place, and time.   Psychiatric:         Behavior: Behavior normal.         Assessment/Plan       Encounter Diagnoses   Name Primary?   • Women's annual routine gynecological examination Yes   • Encounter for breast cancer screening using non-mammogram modality    • Intramural leiomyoma of uterus    • HPV vaccine counseling        Plan     1. Recommended use of Vitamin D replacement and getting adequate calcium in her diet. (1500mg)  2. Reviewed monthly self breast exams.  Instructed to call with lumps, pain, or breast discharge.    3. Continue yearly mammography  4. Reviewed HPV guidelines.  5. Reviewed HPV guidelines and encouraged consideration of HPV vaccine  6. Reviewed exercise as a preventative health measures.   7. Leiomyomata - stable.  Repeat u/s in 12 months.  Todays u/s reviewed with patient.  8. Discussed Trigeminal neuralgia after COVID vaccine      Kadie Barraza MD  09/28/2021

## 2021-11-09 DIAGNOSIS — J30.9 ALLERGIC RHINITIS: ICD-10-CM

## 2021-11-09 RX ORDER — FLUTICASONE PROPIONATE 50 MCG
SPRAY, SUSPENSION (ML) NASAL
Qty: 48 G | Refills: 2 | Status: SHIPPED | OUTPATIENT
Start: 2021-11-09

## 2021-11-09 NOTE — TELEPHONE ENCOUNTER
Rx Refill Note  Requested Prescriptions     Pending Prescriptions Disp Refills   • fluticasone (FLONASE) 50 MCG/ACT nasal spray [Pharmacy Med Name: FLUTICASONE 50MCG NASAL SP (120) RX] 48 g 2     Sig: INSTILL 2 SPRAYS IN EACH NOSTRIL AS DIRECTED BY PROVIDER DAILY      Last office visit with prescribing clinician: 3/16/2021      Next office visit with prescribing clinician: Visit date not found     Last Fill Date: 01/26/2021  Quantity: 48  Refills: 2    April GRANT Elizabeth MA  11/09/21, 09:16 EST

## 2021-11-13 DIAGNOSIS — I10 BENIGN ESSENTIAL HYPERTENSION: ICD-10-CM

## 2021-11-15 RX ORDER — HYDROCHLOROTHIAZIDE 12.5 MG/1
TABLET ORAL
Qty: 90 TABLET | Refills: 1 | Status: SHIPPED | OUTPATIENT
Start: 2021-11-15 | End: 2022-01-26 | Stop reason: SDUPTHER

## 2021-11-15 NOTE — TELEPHONE ENCOUNTER
Rx Refill Note  Requested Prescriptions     Pending Prescriptions Disp Refills   • hydroCHLOROthiazide (HYDRODIURIL) 12.5 MG tablet [Pharmacy Med Name: HYDROCHLOROTHIAZIDE 12.5MG TABLETS] 90 tablet 1     Sig: TAKE 1 TABLET BY MOUTH EVERY DAY      Last office visit with prescribing clinician: 3/16/2021      Next office visit with prescribing clinician: Visit date not found            Marlon Siegel MA  11/15/21, 11:46 EST

## 2022-01-11 ENCOUNTER — OFFICE VISIT (OUTPATIENT)
Dept: FAMILY MEDICINE CLINIC | Facility: CLINIC | Age: 47
End: 2022-01-11

## 2022-01-11 VITALS
TEMPERATURE: 98.6 F | RESPIRATION RATE: 18 BRPM | HEIGHT: 61 IN | WEIGHT: 217 LBS | HEART RATE: 86 BPM | SYSTOLIC BLOOD PRESSURE: 118 MMHG | DIASTOLIC BLOOD PRESSURE: 78 MMHG | OXYGEN SATURATION: 100 % | BODY MASS INDEX: 40.97 KG/M2

## 2022-01-11 DIAGNOSIS — I10 PRIMARY HYPERTENSION: Primary | ICD-10-CM

## 2022-01-11 PROCEDURE — 99213 OFFICE O/P EST LOW 20 MIN: CPT | Performed by: PHYSICIAN ASSISTANT

## 2022-01-11 RX ORDER — NEBIVOLOL 10 MG/1
10 TABLET ORAL DAILY
Qty: 90 TABLET | Refills: 3 | Status: SHIPPED | OUTPATIENT
Start: 2022-01-11 | End: 2022-11-01 | Stop reason: SDUPTHER

## 2022-01-25 DIAGNOSIS — J30.9 ALLERGIC RHINITIS: ICD-10-CM

## 2022-01-25 DIAGNOSIS — E28.2 PCOS (POLYCYSTIC OVARIAN SYNDROME): ICD-10-CM

## 2022-01-25 DIAGNOSIS — I10 BENIGN ESSENTIAL HYPERTENSION: ICD-10-CM

## 2022-01-25 RX ORDER — CARVEDILOL 6.25 MG/1
TABLET ORAL
Qty: 180 TABLET | Refills: 1 | OUTPATIENT
Start: 2022-01-25

## 2022-01-25 RX ORDER — METFORMIN HYDROCHLORIDE 500 MG/1
TABLET, EXTENDED RELEASE ORAL
Qty: 180 TABLET | Refills: 1 | OUTPATIENT
Start: 2022-01-25

## 2022-01-25 RX ORDER — LEVOCETIRIZINE DIHYDROCHLORIDE 5 MG/1
5 TABLET, FILM COATED ORAL EVERY EVENING
Qty: 90 TABLET | Refills: 1 | OUTPATIENT
Start: 2022-01-25

## 2022-01-26 ENCOUNTER — OFFICE VISIT (OUTPATIENT)
Dept: FAMILY MEDICINE CLINIC | Facility: CLINIC | Age: 47
End: 2022-01-26

## 2022-01-26 VITALS
OXYGEN SATURATION: 98 % | HEIGHT: 61 IN | SYSTOLIC BLOOD PRESSURE: 120 MMHG | HEART RATE: 81 BPM | DIASTOLIC BLOOD PRESSURE: 78 MMHG | TEMPERATURE: 98 F | WEIGHT: 213 LBS | RESPIRATION RATE: 20 BRPM | BODY MASS INDEX: 40.22 KG/M2

## 2022-01-26 DIAGNOSIS — I10 BENIGN ESSENTIAL HYPERTENSION: Primary | ICD-10-CM

## 2022-01-26 DIAGNOSIS — J30.9 ALLERGIC RHINITIS: ICD-10-CM

## 2022-01-26 DIAGNOSIS — Z76.0 MEDICATION REFILL: ICD-10-CM

## 2022-01-26 DIAGNOSIS — E28.2 PCOS (POLYCYSTIC OVARIAN SYNDROME): ICD-10-CM

## 2022-01-26 PROBLEM — R26.89 IMPAIRMENT OF BALANCE: Status: ACTIVE | Noted: 2021-05-06

## 2022-01-26 PROCEDURE — 99214 OFFICE O/P EST MOD 30 MIN: CPT | Performed by: NURSE PRACTITIONER

## 2022-01-26 RX ORDER — LEVOCETIRIZINE DIHYDROCHLORIDE 5 MG/1
5 TABLET, FILM COATED ORAL EVERY EVENING
Qty: 90 TABLET | Refills: 3 | Status: SHIPPED | OUTPATIENT
Start: 2022-01-26 | End: 2023-01-23 | Stop reason: SDUPTHER

## 2022-01-26 RX ORDER — METFORMIN HYDROCHLORIDE 500 MG/1
500 TABLET, EXTENDED RELEASE ORAL 2 TIMES DAILY
Qty: 180 TABLET | Refills: 3 | Status: SHIPPED | OUTPATIENT
Start: 2022-01-26 | End: 2022-11-01 | Stop reason: SDUPTHER

## 2022-01-26 RX ORDER — HYDROCHLOROTHIAZIDE 12.5 MG/1
12.5 TABLET ORAL DAILY
Qty: 90 TABLET | Refills: 3 | Status: SHIPPED | OUTPATIENT
Start: 2022-01-26 | End: 2022-11-01 | Stop reason: SDUPTHER

## 2022-01-27 RX ORDER — DIAZEPAM 5 MG/1
TABLET ORAL
COMMUNITY
End: 2022-05-05

## 2022-01-27 RX ORDER — CYANOCOBALAMIN 1000 UG/ML
INJECTION, SOLUTION INTRAMUSCULAR; SUBCUTANEOUS
COMMUNITY
End: 2022-05-05

## 2022-01-27 RX ORDER — HYDROXYZINE PAMOATE 25 MG/1
CAPSULE ORAL
COMMUNITY
End: 2022-04-04

## 2022-01-27 RX ORDER — NEEDLES, SAFETY 22GX1 1/2"
NEEDLE, DISPOSABLE MISCELLANEOUS
COMMUNITY
End: 2022-05-05

## 2022-01-27 NOTE — PATIENT INSTRUCTIONS
Managing Your Hypertension  Hypertension, also called high blood pressure, is when the force of the blood pressing against the walls of the arteries is too strong. Arteries are blood vessels that carry blood from your heart throughout your body. Hypertension forces the heart to work harder to pump blood and may cause the arteries to become narrow or stiff.  Understanding blood pressure readings  Your personal target blood pressure may vary depending on your medical conditions, your age, and other factors. A blood pressure reading includes a higher number over a lower number. Ideally, your blood pressure should be below 120/80. You should know that:  · The first, or top, number is called the systolic pressure. It is a measure of the pressure in your arteries as your heart beats.  · The second, or bottom number, is called the diastolic pressure. It is a measure of the pressure in your arteries as the heart relaxes.  Blood pressure is classified into four stages. Based on your blood pressure reading, your health care provider may use the following stages to determine what type of treatment you need, if any. Systolic pressure and diastolic pressure are measured in a unit called mmHg.  Normal  · Systolic pressure: below 120.  · Diastolic pressure: below 80.  Elevated  · Systolic pressure: 120-129.  · Diastolic pressure: below 80.  Hypertension stage 1  · Systolic pressure: 130-139.  · Diastolic pressure: 80-89.  Hypertension stage 2  · Systolic pressure: 140 or above.  · Diastolic pressure: 90 or above.  How can this condition affect me?  Managing your hypertension is an important responsibility. Over time, hypertension can damage the arteries and decrease blood flow to important parts of the body, including the brain, heart, and kidneys. Having untreated or uncontrolled hypertension can lead to:  · A heart attack.  · A stroke.  · A weakened blood vessel (aneurysm).  · Heart failure.  · Kidney damage.  · Eye  damage.  · Metabolic syndrome.  · Memory and concentration problems.  · Vascular dementia.  What actions can I take to manage this condition?  Hypertension can be managed by making lifestyle changes and possibly by taking medicines. Your health care provider will help you make a plan to bring your blood pressure within a normal range.  Nutrition    · Eat a diet that is high in fiber and potassium, and low in salt (sodium), added sugar, and fat. An example eating plan is called the Dietary Approaches to Stop Hypertension (DASH) diet. To eat this way:  ? Eat plenty of fresh fruits and vegetables. Try to fill one-half of your plate at each meal with fruits and vegetables.  ? Eat whole grains, such as whole-wheat pasta, brown rice, or whole-grain bread. Fill about one-fourth of your plate with whole grains.  ? Eat low-fat dairy products.  ? Avoid fatty cuts of meat, processed or cured meats, and poultry with skin. Fill about one-fourth of your plate with lean proteins such as fish, chicken without skin, beans, eggs, and tofu.  ? Avoid pre-made and processed foods. These tend to be higher in sodium, added sugar, and fat.  · Reduce your daily sodium intake. Most people with hypertension should eat less than 1,500 mg of sodium a day.    Lifestyle    · Work with your health care provider to maintain a healthy body weight or to lose weight. Ask what an ideal weight is for you.  · Get at least 30 minutes of exercise that causes your heart to beat faster (aerobic exercise) most days of the week. Activities may include walking, swimming, or biking.  · Include exercise to strengthen your muscles (resistance exercise), such as weight lifting, as part of your weekly exercise routine. Try to do these types of exercises for 30 minutes at least 3 days a week.  · Do not use any products that contain nicotine or tobacco, such as cigarettes, e-cigarettes, and chewing tobacco. If you need help quitting, ask your health care  provider.  · Control any long-term (chronic) conditions you have, such as high cholesterol or diabetes.  · Identify your sources of stress and find ways to manage stress. This may include meditation, deep breathing, or making time for fun activities.    Alcohol use  · Do not drink alcohol if:  ? Your health care provider tells you not to drink.  ? You are pregnant, may be pregnant, or are planning to become pregnant.  · If you drink alcohol:  ? Limit how much you use to:  § 0-1 drink a day for women.  § 0-2 drinks a day for men.  ? Be aware of how much alcohol is in your drink. In the [place] one drink equals one 12 oz bottle of beer (355 mL), one 5 oz glass of wine (148 mL), or one 1½ oz glass of hard liquor (44 mL).  Medicines  Your health care provider may prescribe medicine if lifestyle changes are not enough to get your blood pressure under control and if:  · Your systolic blood pressure is 130 or higher.  · Your diastolic blood pressure is 80 or higher.  Take medicines only as told by your health care provider. Follow the directions carefully. Blood pressure medicines must be taken as told by your health care provider. The medicine does not work as well when you skip doses. Skipping doses also puts you at risk for problems.  Monitoring  Before you monitor your blood pressure:  · Do not smoke, drink caffeinated beverages, or exercise within 30 minutes before taking a measurement.  · Use the bathroom and empty your bladder (urinate).  · Sit quietly for at least 5 minutes before taking measurements.  Monitor your blood pressure at home as told by your health care provider. To do this:  · Sit with your back straight and supported.  · Place your feet flat on the floor. Do not cross your legs.  · Support your arm on a flat surface, such as a table. Make sure your upper arm is at heart level.  · Each time you measure, take two or three readings one minute apart and record the results.  You may also need to have your  blood pressure checked regularly by your health care provider.  General information  · Talk with your health care provider about your diet, exercise habits, and other lifestyle factors that may be contributing to hypertension.  · Review all the medicines you take with your health care provider because there may be side effects or interactions.  · Keep all visits as told by your health care provider. Your health care provider can help you create and adjust your plan for managing your high blood pressure.  Where to find more information  · National Heart, Lung, and [employer] [place]: www.nhlbi.nih.gov  · American Heart Association: www.heart.org  Contact a health care provider if:  · You think you are having a reaction to medicines you have taken.  · You have repeated (recurrent) headaches.  · You feel dizzy.  · You have swelling in your ankles.  · You have trouble with your vision.  Get help right away if:  · You develop a severe headache or confusion.  · You have unusual weakness or numbness, or you feel faint.  · You have severe pain in your chest or abdomen.  · You vomit repeatedly.  · You have trouble breathing.  These symptoms may represent a serious problem that is an emergency. Do not wait to see if the symptoms will go away. Get medical help right away. Call your local emergency services (911 in the [place] Do not drive yourself to the hospital.  Summary  · Hypertension is when the force of blood pumping through your arteries is too strong. If this condition is not controlled, it may put you at risk for serious complications.  · Your personal target blood pressure may vary depending on your medical conditions, your age, and other factors. For most people, a normal blood pressure is less than 120/80.  · Hypertension is managed by lifestyle changes, medicines, or both.  · Lifestyle changes to help manage hypertension include losing weight, eating a healthy, low-sodium diet, exercising more, stopping smoking, and  limiting alcohol.  This information is not intended to replace advice given to you by your health care provider. Make sure you discuss any questions you have with your health care provider.  Document Revised: 01/22/2021 Document Reviewed: 11/17/2020  Elsevier Patient Education © 2021 [employer]    Allergic Rhinitis, Adult    Allergic rhinitis is an allergic reaction that affects the mucous membrane inside the nose. The mucous membrane is the tissue that produces mucus.  There are two types of allergic rhinitis:  · Seasonal. This type is also called hay fever and happens only during certain seasons.  · Perennial. This type can happen at any time of the year.  Allergic rhinitis cannot be spread from person to person. This condition can be mild, moderate, or severe. It can develop at any age and may be outgrown.  What are the causes?  This condition is caused by allergens. These are things that can cause an allergic reaction. Allergens may differ for seasonal allergic rhinitis and perennial allergic rhinitis.  · Seasonal allergic rhinitis is triggered by pollen. Pollen can come from grasses, trees, and weeds.  · Perennial allergic rhinitis may be triggered by:  ? Dust mites.  ? Proteins in a pet's urine, saliva, or dander. Dander is dead skin cells from a pet.  ? Smoke, mold, or car fumes.  What increases the risk?  You are more likely to develop this condition if you have a family history of allergies or other conditions related to allergies, including:  · Allergic conjunctivitis. This is inflammation of parts of the eyes and eyelids.  · Asthma. This condition affects the lungs and makes it hard to breathe.  · Atopic dermatitis or eczema. This is long term (chronic) inflammation of the skin.  · Food allergies.  What are the signs or symptoms?  Symptoms of this condition include:  · Sneezing or coughing.  · A stuffy nose (nasal congestion), itchy nose, or nasal discharge.  · Itchy eyes and tearing of the eyes.  · A  feeling of mucus dripping down the back of your throat (postnasal drip).  · Trouble sleeping.  · Tiredness or fatigue.  · Headache.  · Sore throat.  How is this diagnosed?  This condition may be diagnosed with your symptoms, medical history, and physical exam. Your health care provider may check for related conditions, such as:  · Asthma.  · Pink eye. This is eye inflammation caused by infection (conjunctivitis).  · Ear infection.  · Upper respiratory infection. This is an infection in the nose, throat, or upper airways.  You may also have tests to find out which allergens trigger your symptoms. These may include skin tests or blood tests.  How is this treated?  There is no cure for this condition, but treatment can help control symptoms. Treatment may include:  · Taking medicines that block allergy symptoms, such as corticosteroids and antihistamines. Medicine may be given as a shot, nasal spray, or pill.  · Avoiding any allergens.  · Being exposed again and again to tiny amounts of allergens to help you build a defense against allergens (immunotherapy). This is done if other treatments have not helped. It may include:  ? Allergy shots. These are injected medicines that have small amounts of allergen in them.  ? Sublingual immunotherapy. This involves taking small doses of a medicine with allergen in it under your tongue.  If these treatments do not work, your health care provider may prescribe newer, stronger medicines.  Follow these instructions at home:  Avoiding allergens  Find out what you are allergic to and avoid those allergens. These are some things you can do to help avoid allergens:  · If you have perennial allergies:  ? Replace carpet with wood, tile, or vinyl indy. Carpet can trap dander and dust.  ? Do not smoke. Do not allow smoking in your home.  ? Change your heating and air conditioning filters at least once a month.  · If you have seasonal allergies, take these steps during allergy  season:  ? Keep windows closed as much as possible.  ? Plan outdoor activities when pollen counts are lowest. Check pollen counts before you plan outdoor activities.  ? When coming indoors, change clothing and shower before sitting on furniture or bedding.  · If you have a pet in the house that produces allergens:  ? Keep the pet out of the bedroom.  ? Vacuum, sweep, and dust regularly.  General instructions  · Take over-the-counter and prescription medicines only as told by your health care provider.  · Drink enough fluid to keep your urine pale yellow.  · Keep all follow-up visits as told by your health care provider. This is important.  Where to find more information  · American Academy of Allergy, Asthma & Immunology: www.aaaai.org  Contact a health care provider if:  · You have a fever.  · You develop a cough that does not go away.  · You make whistling sounds when you breathe (wheeze).  · Your symptoms slow you down or stop you from doing your normal activities each day.  Get help right away if:  · You have shortness of breath.  This symptom may represent a serious problem that is an emergency. Do not wait to see if the symptom will go away. Get medical help right away. Call your local emergency services (911 in the [place] Do not drive yourself to the hospital.  Summary  · Allergic rhinitis may be managed by taking medicines as directed and avoiding allergens.  · If you have seasonal allergies, keep windows closed as much as possible during allergy season.  · Contact your health care provider if you develop a fever or a cough that does not go away.  This information is not intended to replace advice given to you by your health care provider. Make sure you discuss any questions you have with your health care provider.  Document Revised: 02/05/2021 Document Reviewed: 12/15/2020  Elsevier Patient Education © 2021 [employer] Inc.    Polycystic Ovary Syndrome    Polycystic ovarian syndrome (PCOS) is a common hormonal  disorder among women of reproductive age. In most women with PCOS, small fluid-filled sacs (cysts) grow on the ovaries. PCOS can cause problems with menstrual periods and make it hard to get and stay pregnant. If this condition is not treated, it can lead to serious health problems, such as diabetes and heart disease.  What are the causes?  The cause of this condition is not known. It may be due to certain factors, such as:  · Irregular menstrual cycle.  · High levels of certain hormones.  · Problems with the hormone that helps to control blood sugar (insulin).  · Certain genes.  What increases the risk?  You are more likely to develop this condition if you:  · Have a family history of PCOS or type 2 diabetes.  · Are overweight, eat unhealthy foods, and are not active. These factors may cause problems with blood sugar control, which can contribute to PCOS or PCOS symptoms.  What are the signs or symptoms?  Symptoms of this condition include:  · Ovarian cysts and sometimes pelvic pain.  · Menstrual periods that are not regular or are too heavy.  · Inability to get or stay pregnant.  · Increased growth of hair on the face, chest, stomach, back, thumbs, thighs, or toes.  · Acne or oily skin. Acne may develop during adulthood, and it may not get better with treatment.  · Weight gain or obesity.  · Patches of thickened and dark brown or black skin on the neck, arms, breasts, or thighs.  How is this diagnosed?  This condition is diagnosed based on:  · Your medical history.  · A physical exam that includes a pelvic exam. Your health care provider may look for areas of increased hair growth on your skin.  · Tests, such as:  ? An ultrasound to check the ovaries for cysts and to view the lining of the uterus.  ? Blood tests to check levels of sugar (glucose), male hormone (testosterone), and female hormones (estrogen and progesterone).  How is this treated?  There is no cure for this condition, but treatment can help to  manage symptoms and prevent more health problems from developing. Treatment varies depending on your symptoms and if you want to have a baby or if you need birth control.  Treatment may include:  · Making nutrition and lifestyle changes.  · Taking the progesterone hormone to start a menstrual period.  · Taking birth control pills to help you have regular menstrual periods.  · Taking medicines such as:  ? Medicines to make you ovulate, if you want to get pregnant.  ? Medicine to reduce extra hair growth.  · Having surgery in severe cases. This may involve making small holes in one or both of your ovaries. This decreases the amount of testosterone that your body makes.  Follow these instructions at home:  · Take over-the-counter and prescription medicines only as told by your health care provider.  · Follow a healthy meal plan that includes lean proteins, complex carbohydrates, fresh fruits and vegetables, low-fat dairy products, healthy fats, and fiber.  · If you are overweight, lose weight as told by your health care provider. Your health care provider can determine how much weight loss is best for you and can help you lose weight safely.  · Keep all follow-up visits. This is important.  Contact a health care provider if:  · Your symptoms do not get better with medicine.  · Your symptoms get worse or you develop new symptoms.  Summary  · Polycystic ovarian syndrome (PCOS) is a common hormonal disorder among women of reproductive age.  · PCOS can cause problems with menstrual periods and make it hard to get and stay pregnant.  · If this condition is not treated, it can lead to serious health problems, such as diabetes and heart disease.  · There is no cure for this condition, but treatment can help to manage symptoms and prevent more health problems from developing.  This information is not intended to replace advice given to you by your health care provider. Make sure you discuss any questions you have with your  health care provider.  Document Revised: 05/27/2021 Document Reviewed: 05/27/2021  [employer] Patient Education © 2021 [employer] Inc[employer]  Nebivolol Oral Tablets  What is this medicine?  NEBIVOLOL (ne BIV oh lol) is a beta blocker. It decreases the amount of work your heart has to do and helps your heart beat regularly. It treats high blood pressure.  This medicine may be used for other purposes; ask your health care provider or pharmacist if you have questions.  COMMON BRAND NAME(S): Bystolic  What should I tell my health care provider before I take this medicine?  They need to know if you have any of these conditions:  · diabetes  · heart or vessel disease like slow heartrate, worsening heart failure, heart block, sick sinus syndrome or Raynaud's disease  · kidney disease  · liver disease  · lung disease like asthma or emphysema  · pheochromocytoma  · thyroid disease  · an unusual or allergic reaction to nebivolol, other beta-blockers, medicines, foods, dyes, or preservatives  · pregnant or trying to get pregnant  · breast-feeding  How should I use this medicine?  Take this drug by mouth. Take it as directed on the prescription label at the same time every day. Keep taking it unless your health care provider tells you to stop.  Talk to your health care provider about the use of this drug in children. Special care may be needed.  Overdosage: If you think you have taken too much of this medicine contact a poison control center or emergency room at once.  NOTE: This medicine is only for you. Do not share this medicine with others.  What if I miss a dose?  If you miss a dose, take it as soon as you can. If it is almost time for your next dose, take only that dose. Do not take double or extra doses.  What may interact with this medicine?  This medicine may interact with the following medications:  · certain medicines for blood pressure, heart disease, irregular heart beat  · certain medicines for depression, like fluoxetine  or paroxetine  · cimetidine  · clonidine  · reserpine  · sildenafil  This list may not describe all possible interactions. Give your health care provider a list of all the medicines, herbs, non-prescription drugs, or dietary supplements you use. Also tell them if you smoke, drink alcohol, or use illegal drugs. Some items may interact with your medicine.  What should I watch for while using this medicine?  Visit your doctor or health care professional for regular checks on your progress. Check your heart rate and blood pressure regularly while you are taking this medicine. Ask your doctor or health care professional what your heart rate and blood pressure should be, and when you should contact him or her.  You may get drowsy or dizzy. Do not drive, use machinery, or do anything that needs mental alertness until you know how this drug affects you. Do not stand or sit up quickly, especially if you are an older patient. This reduces the risk of dizzy or fainting spells. Alcohol can make you more drowsy and dizzy. Avoid alcoholic drinks.  This medicine may increase blood sugar. Ask your healthcare provider if changes in diet or medicines are needed if you have diabetes.  Do not treat yourself for coughs, colds, or pain while you are taking this medicine without asking your doctor or health care professional for advice. Some ingredients may increase your blood pressure.  What side effects may I notice from receiving this medicine?  Side effects that you should report to your doctor or health care provider as soon as possible:  · allergic reactions like skin rash, itching or hives, swelling of the face, lips, or tongue  · breathing problems  · chest pain  · cold, tingling, or numb hands or feet  · high blood sugar (increased hunger, thirst or urination; unusually weak or tired, blurry vision)  · irregular heartbeat  · liver injury (dark yellow or brown urine; general ill feeling or flu-like symptoms; loss of appetite;  right upper belly pain; unusually weak or tired; yellowing of the eyes or skin)  · slow heart rate  · swollen legs or ankles  · unusual bleeding or bruising  · vomiting  Side effects that usually do not require medical attention (report to your doctor or health care provider if they continue or are bothersome):  · diarrhea  · dizziness  · dry or burning eyes  · headache  · nausea  · tiredness  · trouble sleeping  This list may not describe all possible side effects. Call your doctor for medical advice about side effects. You may report side effects to FDA at 3-258-OZK-4865.  Where should I keep my medicine?  Keep out of the reach of children and pets.  Store at room temperature between 20 and 25 degrees C (68 and 77 degrees F). Throw away any unused drug after the expiration date.  NOTE: This sheet is a summary. It may not cover all possible information. If you have questions about this medicine, talk to your doctor, pharmacist, or health care provider.  © 2021 [employer] (2021-04-14 09:38:36)

## 2022-01-27 NOTE — PROGRESS NOTES
Follow Up Office Note     Patient Name: Mariusz Yepez  : 1975   MRN: [id number]     Chief Complaint:    Chief Complaint   Patient presents with   • Hypertension     follow-up   • Allergies     med refill   • PCOS, Not Currently Desiring Pregnancy     med refill       History of Present Illness: Mariusz Yepez is a 46 y.o. female who presents today for reevaluation and management of chronic hypertension.  Patient was seen on 2022 for same at which time she requested her blood pressure medication be changed from carvedilol to Bystolic as it is now generic.  She felt that her blood pressure was better controlled on this medication and it was a once a day dosing which was appealing to her.  She is here today for 2-week follow-up to evaluate how her blood pressure is doing on the new medication.  Patient was on Bystolic in 2018 but due to cost the medication was changed to carvedilol.  Patient has been monitoring her blood pressure at home for the past 2 weeks and states that it has been improving and is within normal ranges.  She denies any side effects or adverse reactions to Bystolic.  She would like to continue this medication.  Patient is also requesting refills on some of her other routine medications today.  Patient has chronic allergies which have been stable and controlled on Xyzal.  Patient also has chronic PCOS for which she takes Metformin for management.  Her symptoms have been stable on this medication.      Subjective      Review of Systems:   Review of Systems   Constitutional: Negative for activity change, appetite change, chills, diaphoresis, fatigue, fever and unexpected weight change.   Respiratory: Negative for chest tightness, shortness of breath, wheezing and stridor.    Cardiovascular: Negative for chest pain, palpitations and leg swelling.   Gastrointestinal: Negative.    Skin: Negative for rash.        Past Medical History:   Past Medical History:   Diagnosis  Date   • Asthma     Allergy induced   • Dysphagia    • Esophageal reflux    • Fibroids    • History of mammography, screening 09/06/2017    Dr. Barraza   • History of vitamin D deficiency    • Hypertension    • Pap smear for cervical cancer screening 2016    Dr. Hanna    • Polycystic ovaries    • Screening breast examination     denies   • Thyroid nodule          Medications:     Current Outpatient Medications:   •  albuterol (PROVENTIL HFA;VENTOLIN HFA) 108 (90 Base) MCG/ACT inhaler, Inhale 2 puffs Every 4 (Four) Hours As Needed for Wheezing., Disp: 1 inhaler, Rfl: 2  •  azelastine (ASTELIN) 0.1 % nasal spray, INSTILL 2 SPRAYS IN EACH NOSTRIL AS DIRECTED BY PROVIDER TWICE DAILY, Disp: 30 mL, Rfl: 11  •  Cholecalciferol (VITAMIN D3) 2000 UNITS tablet, Take  by mouth., Disp: , Rfl:   •  cyanocobalamin 1000 MCG/ML injection, Inject 1 mL into the appropriate muscle daily x 1 week, then once a week x 1 month, then monthly, Disp: 30 mL, Rfl: 5  •  FeroSul 325 (65 Fe) MG tablet, TAKE 1 TABLET BY MOUTH TWICE DAILY WITH FOOD, Disp: 180 tablet, Rfl: 1  •  fluticasone (FLONASE) 50 MCG/ACT nasal spray, INSTILL 2 SPRAYS IN EACH NOSTRIL AS DIRECTED BY PROVIDER DAILY, Disp: 48 g, Rfl: 2  •  hydroCHLOROthiazide (HYDRODIURIL) 12.5 MG tablet, Take 1 tablet by mouth Daily., Disp: 90 tablet, Rfl: 3  •  lamoTRIgine (LaMICtal) 25 MG tablet, Take 1 tablet by mouth 2 (Two) Times a Day., Disp: 60 tablet, Rfl: 3  •  levocetirizine (XYZAL) 5 MG tablet, Take 1 tablet by mouth Every Evening., Disp: 90 tablet, Rfl: 3  •  Lo Loestrin Fe 1 MG-10 MCG / 10 MCG tablet, TAKE 1 TABLET BY MOUTH EVERY DAY, Disp: 84 tablet, Rfl: 3  •  Melatonin 3 MG tablet, Take  by mouth Every Night., Disp: , Rfl:   •  metFORMIN ER (GLUCOPHAGE-XR) 500 MG 24 hr tablet, Take 1 tablet by mouth 2 (Two) Times a Day., Disp: 180 tablet, Rfl: 3  •  montelukast (SINGULAIR) 10 MG tablet, TAKE 1 TABLET BY MOUTH EVERY NIGHT AT BEDTIME, Disp: 90 tablet, Rfl: 1  •  nebivolol (BYSTOLIC)  "10 MG tablet, Take 1 tablet by mouth Daily., Disp: 90 tablet, Rfl: 3  •  Omega-3 Fatty Acids (FISH OIL) 1200 MG capsule capsule, Take 1,200 mg by mouth Daily., Disp: , Rfl:   •  omeprazole (priLOSEC) 40 MG capsule, TAKE 1 CAPSULE BY MOUTH TWICE DAILY, Disp: 180 capsule, Rfl: 1  •  QVAR REDIHALER 80 MCG/ACT inhaler, INHALE 1 PUFF BY MOUTH TWICE DAILY, Disp: 10.6 g, Rfl: 5  •  Syringe/Needle, Disp, (B-D SYRINGE/NEEDLE 1CC/25GX5/8) 25G X 5/8\" 1 ML misc, Use as directed with B12 injections, Disp: 50 each, Rfl: 5    Allergies:   Allergies   Allergen Reactions   • Biaxin [Clarithromycin]      dizziness   • Lopressor [Metoprolol Tartrate]      Fatigue     • Norvasc [Amlodipine Besylate]      Constipation    • Phenergan [Promethazine Hcl] Shortness Of Breath   • Codeine Nausea And Vomiting   • Carbamazepine Rash         Objective     Physical Exam:  Vital Signs:   Vitals:    01/26/22 0958   BP: 120/78   Pulse: 81   Resp: 20   Temp: 98 °F (36.7 °C)   SpO2: 98%   Weight: 96.6 kg (213 lb)   Height: 154.9 cm (61\")   PainSc: 0-No pain     Body mass index is 40.25 kg/m².     Physical Exam  Vitals and nursing note reviewed.   Constitutional:       General: She is not in acute distress.     Appearance: Normal appearance. She is well-developed. She is not ill-appearing, toxic-appearing or diaphoretic.   HENT:      Head: Normocephalic and atraumatic.   Cardiovascular:      Rate and Rhythm: Normal rate and regular rhythm.      Heart sounds: Normal heart sounds, S1 normal and S2 normal. No murmur heard.      Pulmonary:      Effort: Pulmonary effort is normal. No respiratory distress.      Breath sounds: Normal breath sounds. No stridor. No wheezing.   Musculoskeletal:      Right lower leg: No edema.      Left lower leg: No edema.   Skin:     General: Skin is warm and dry.   Neurological:      General: No focal deficit present.      Mental Status: She is alert and oriented to person, place, and time.   Psychiatric:         Mood and " Affect: Mood normal.         Behavior: Behavior normal. Behavior is cooperative.         Thought Content: Thought content normal.         Judgment: Judgment normal.         Assessment / Plan      Assessment/Plan:   Diagnoses and all orders for this visit:    1. Benign essential hypertension (Primary)  Assessment & Plan:  Hypertension is improving with treatment.  Continue current treatment regimen.  Dietary sodium restriction.  Weight loss.  Regular aerobic exercise.  Continue current medications.  Ambulatory blood pressure monitoring.  Blood pressure will be reassessed at the next regular appointment.    Orders:  -     hydroCHLOROthiazide (HYDRODIURIL) 12.5 MG tablet; Take 1 tablet by mouth Daily.  Dispense: 90 tablet; Refill: 3    2. Allergic rhinitis  Assessment & Plan:  Allergy symptoms stable and controlled with Xyzal.  Continue current treatment plan.    Orders:  -     levocetirizine (XYZAL) 5 MG tablet; Take 1 tablet by mouth Every Evening.  Dispense: 90 tablet; Refill: 3    3. PCOS (polycystic ovarian syndrome)  Assessment & Plan:  PCOS symptoms stable on current medication.  Continue current treatment plan.    Orders:  -     metFORMIN ER (GLUCOPHAGE-XR) 500 MG 24 hr tablet; Take 1 tablet by mouth 2 (Two) Times a Day.  Dispense: 180 tablet; Refill: 3    4. Medication refill  -     levocetirizine (XYZAL) 5 MG tablet; Take 1 tablet by mouth Every Evening.  Dispense: 90 tablet; Refill: 3  -     metFORMIN ER (GLUCOPHAGE-XR) 500 MG 24 hr tablet; Take 1 tablet by mouth 2 (Two) Times a Day.  Dispense: 180 tablet; Refill: 3  -     hydroCHLOROthiazide (HYDRODIURIL) 12.5 MG tablet; Take 1 tablet by mouth Daily.  Dispense: 90 tablet; Refill: 3     Follow Up:   PRN and at next scheduled appointment(s) with PCP.    Discussed the nature of the medical condition(s) risks, complications, implications, management, safe and proper use of medications. Encouraged medication compliance, and keeping scheduled follow up  appointments with me and any other providers.      RTC if symptoms fail to improve, to ER if symptoms worsen.      DAVIN Vila  [facility] [place]

## 2022-02-09 DIAGNOSIS — K21.9 GASTROESOPHAGEAL REFLUX DISEASE: ICD-10-CM

## 2022-02-09 RX ORDER — OMEPRAZOLE 40 MG/1
CAPSULE, DELAYED RELEASE ORAL
Qty: 180 CAPSULE | Refills: 1 | Status: SHIPPED | OUTPATIENT
Start: 2022-02-09 | End: 2022-11-01 | Stop reason: SDUPTHER

## 2022-03-20 DIAGNOSIS — J30.9 ALLERGIC RHINITIS: ICD-10-CM

## 2022-03-21 RX ORDER — MONTELUKAST SODIUM 10 MG/1
10 TABLET ORAL
Qty: 90 TABLET | Refills: 1 | OUTPATIENT
Start: 2022-03-21

## 2022-03-24 DIAGNOSIS — J30.9 ALLERGIC RHINITIS: ICD-10-CM

## 2022-03-24 RX ORDER — MONTELUKAST SODIUM 10 MG/1
10 TABLET ORAL
Qty: 90 TABLET | Refills: 1 | Status: SHIPPED | OUTPATIENT
Start: 2022-03-24 | End: 2022-11-01 | Stop reason: SDUPTHER

## 2022-03-24 NOTE — TELEPHONE ENCOUNTER
Caller: Mariusz Yepez    Relationship: Self    Best call back number:  [phone]    Requested Prescriptions:   Requested Prescriptions      No prescriptions requested or ordered in this encounter      montelukast (SINGULAIR) 10 MG tablet  Pharm[id number] [address]  AT [facility] - [phone] [facility] [phone] FX     Additional details provided by patient:      Does the patient have less than a 3 day supply:  [x] Yes  [] No

## 2022-04-12 ENCOUNTER — OFFICE VISIT (OUTPATIENT)
Dept: FAMILY MEDICINE CLINIC | Facility: CLINIC | Age: 47
End: 2022-04-12

## 2022-04-12 VITALS
HEART RATE: 70 BPM | WEIGHT: 210 LBS | OXYGEN SATURATION: 99 % | HEIGHT: 61 IN | SYSTOLIC BLOOD PRESSURE: 130 MMHG | DIASTOLIC BLOOD PRESSURE: 72 MMHG | BODY MASS INDEX: 39.65 KG/M2 | RESPIRATION RATE: 18 BRPM | TEMPERATURE: 98.6 F

## 2022-04-12 DIAGNOSIS — M25.562 ACUTE BILATERAL KNEE PAIN: Primary | ICD-10-CM

## 2022-04-12 DIAGNOSIS — M25.561 ACUTE BILATERAL KNEE PAIN: Primary | ICD-10-CM

## 2022-04-12 PROCEDURE — 99213 OFFICE O/P EST LOW 20 MIN: CPT | Performed by: NURSE PRACTITIONER

## 2022-04-12 NOTE — PROGRESS NOTES
"Answers for HPI/ROS submitted by the patient on 4/12/2022  Please describe your symptoms.: Pain/burning in both knees  Have you had these symptoms before?: No  How long have you been having these symptoms?: 1-4 days  Please list any medications you are currently taking for this condition.: See my chart  What is the primary reason for your visit?: Other    Chief Complaint  Knee Pain (bilateral)    Subjective          Mariusz Yepez presents to [facility]  Knee Pain   The incident occurred 12 to 24 hours ago. The incident occurred at work (States she was walking down the stairs and bilateral knee pain started suddenly and described as burning pain). There was no injury mechanism. The pain is present in the left knee and right knee. The quality of the pain is described as burning. The pain has been intermittent since onset. Pertinent negatives include no inability to bear weight, loss of motion, loss of sensation, muscle weakness, numbness or tingling. She reports no foreign bodies present. The symptoms are aggravated by weight bearing. She has tried NSAIDs and elevation (bio-freeze) for the symptoms. The treatment provided mild relief.       Objective   Vital Signs:   /72   Pulse 70   Temp 98.6 °F (37 °C)   Resp 18   Ht 154.9 cm (61\")   Wt 95.3 kg (210 lb)   SpO2 99%   BMI 39.68 kg/m²            Physical Exam  Vitals and nursing note reviewed.   Constitutional:       General: She is not in acute distress.     Appearance: Normal appearance.   HENT:      Head: Normocephalic.      Right Ear: External ear normal.      Left Ear: External ear normal.      Nose: Nose normal.   Eyes:      Extraocular Movements: Extraocular movements intact.      Conjunctiva/sclera: Conjunctivae normal.      Pupils: Pupils are equal, round, and reactive to light.   Cardiovascular:      Rate and Rhythm: Normal rate and regular rhythm.      Heart sounds: Normal heart sounds.   Pulmonary:     "  Effort: Pulmonary effort is normal.      Breath sounds: Normal breath sounds.   Musculoskeletal:         General: Tenderness present. No swelling. Normal range of motion.      Right knee: No swelling, deformity, erythema or crepitus. Tenderness present over the MCL and LCL.      Left knee: No swelling, deformity, erythema or crepitus. Tenderness present over the MCL and LCL.      Right lower leg: No edema.      Left lower leg: No edema.   Skin:     General: Skin is warm and dry.   Neurological:      Mental Status: She is alert and oriented to person, place, and time.   Psychiatric:         Mood and Affect: Mood normal.         Behavior: Behavior normal.         Thought Content: Thought content normal.         Judgment: Judgment normal.        Result Review :                 Assessment and Plan    Diagnoses and all orders for this visit:    1. Acute bilateral knee pain (Primary)  Assessment & Plan:  X-ray bilateral knees  Labs ordered include CBC and Rheumatoid panel  Elevate extremities  Ice  Compression  Tylenol/Motrin PRN    Orders:  -     XR Knee 3 View Bilateral; Future  -     CBC & Differential; Future  -     Rheumatoid factor; Future  -     C-reactive protein; Future  -     COLLIN; Future      Follow Up   Return if symptoms worsen or fail to improve.  Patient was given instructions and counseling regarding her condition or for health maintenance advice. Please see specific information pulled into the AVS if appropriate.

## 2022-04-12 NOTE — ASSESSMENT & PLAN NOTE
X-ray bilateral knees  Labs ordered include CBC and Rheumatoid panel  Elevate extremities  Ice  Compression  Tylenol/Motrin PRN

## 2022-04-13 ENCOUNTER — LAB (OUTPATIENT)
Dept: LAB | Facility: HOSPITAL | Age: 47
End: 2022-04-13

## 2022-04-13 DIAGNOSIS — M25.561 ACUTE BILATERAL KNEE PAIN: ICD-10-CM

## 2022-04-13 DIAGNOSIS — M25.562 ACUTE BILATERAL KNEE PAIN: ICD-10-CM

## 2022-04-13 LAB
BASOPHILS # BLD AUTO: 0.09 10*3/MM3 (ref 0–0.2)
BASOPHILS NFR BLD AUTO: 0.9 % (ref 0–1.5)
DEPRECATED RDW RBC AUTO: 41.2 FL (ref 37–54)
EOSINOPHIL # BLD AUTO: 0.25 10*3/MM3 (ref 0–0.4)
EOSINOPHIL NFR BLD AUTO: 2.4 % (ref 0.3–6.2)
ERYTHROCYTE [DISTWIDTH] IN BLOOD BY AUTOMATED COUNT: 11.8 % (ref 12.3–15.4)
HCT VFR BLD AUTO: 43.1 % (ref 34–46.6)
HGB BLD-MCNC: 14.3 G/DL (ref 12–15.9)
IMM GRANULOCYTES # BLD AUTO: 0.03 10*3/MM3 (ref 0–0.05)
IMM GRANULOCYTES NFR BLD AUTO: 0.3 % (ref 0–0.5)
LYMPHOCYTES # BLD AUTO: 3.76 10*3/MM3 (ref 0.7–3.1)
LYMPHOCYTES NFR BLD AUTO: 35.8 % (ref 19.6–45.3)
MCH RBC QN AUTO: 32.2 PG (ref 26.6–33)
MCHC RBC AUTO-ENTMCNC: 33.2 G/DL (ref 31.5–35.7)
MCV RBC AUTO: 97.1 FL (ref 79–97)
MONOCYTES # BLD AUTO: 0.79 10*3/MM3 (ref 0.1–0.9)
MONOCYTES NFR BLD AUTO: 7.5 % (ref 5–12)
NEUTROPHILS NFR BLD AUTO: 5.59 10*3/MM3 (ref 1.7–7)
NEUTROPHILS NFR BLD AUTO: 53.1 % (ref 42.7–76)
NRBC BLD AUTO-RTO: 0 /100 WBC (ref 0–0.2)
PLATELET # BLD AUTO: 353 10*3/MM3 (ref 140–450)
PMV BLD AUTO: 10.1 FL (ref 6–12)
RBC # BLD AUTO: 4.44 10*6/MM3 (ref 3.77–5.28)
WBC NRBC COR # BLD: 10.51 10*3/MM3 (ref 3.4–10.8)

## 2022-04-13 PROCEDURE — 85025 COMPLETE CBC W/AUTO DIFF WBC: CPT

## 2022-04-13 PROCEDURE — 86140 C-REACTIVE PROTEIN: CPT

## 2022-04-13 PROCEDURE — 86431 RHEUMATOID FACTOR QUANT: CPT

## 2022-04-13 PROCEDURE — 86038 ANTINUCLEAR ANTIBODIES: CPT

## 2022-04-13 PROCEDURE — 36415 COLL VENOUS BLD VENIPUNCTURE: CPT

## 2022-04-14 LAB
CHROMATIN AB SERPL-ACNC: <10 IU/ML (ref 0–14)
CRP SERPL-MCNC: 1.93 MG/DL (ref 0–0.5)

## 2022-04-15 LAB — ANA SER QL: NEGATIVE

## 2022-04-18 ENCOUNTER — LAB (OUTPATIENT)
Dept: LAB | Facility: HOSPITAL | Age: 47
End: 2022-04-18

## 2022-04-18 ENCOUNTER — OFFICE VISIT (OUTPATIENT)
Dept: FAMILY MEDICINE CLINIC | Facility: CLINIC | Age: 47
End: 2022-04-18

## 2022-04-18 VITALS
SYSTOLIC BLOOD PRESSURE: 114 MMHG | WEIGHT: 211.8 LBS | HEIGHT: 61 IN | BODY MASS INDEX: 39.99 KG/M2 | RESPIRATION RATE: 16 BRPM | TEMPERATURE: 98 F | OXYGEN SATURATION: 99 % | DIASTOLIC BLOOD PRESSURE: 84 MMHG | HEART RATE: 74 BPM

## 2022-04-18 DIAGNOSIS — M25.561 ACUTE PAIN OF BOTH KNEES: Primary | ICD-10-CM

## 2022-04-18 DIAGNOSIS — M25.562 ACUTE PAIN OF BOTH KNEES: Primary | ICD-10-CM

## 2022-04-18 PROCEDURE — 80048 BASIC METABOLIC PNL TOTAL CA: CPT | Performed by: STUDENT IN AN ORGANIZED HEALTH CARE EDUCATION/TRAINING PROGRAM

## 2022-04-18 PROCEDURE — 36415 COLL VENOUS BLD VENIPUNCTURE: CPT | Performed by: STUDENT IN AN ORGANIZED HEALTH CARE EDUCATION/TRAINING PROGRAM

## 2022-04-18 PROCEDURE — 99213 OFFICE O/P EST LOW 20 MIN: CPT | Performed by: STUDENT IN AN ORGANIZED HEALTH CARE EDUCATION/TRAINING PROGRAM

## 2022-04-18 PROCEDURE — 84550 ASSAY OF BLOOD/URIC ACID: CPT | Performed by: STUDENT IN AN ORGANIZED HEALTH CARE EDUCATION/TRAINING PROGRAM

## 2022-04-18 RX ORDER — IBUPROFEN 600 MG/1
600 TABLET ORAL EVERY 8 HOURS PRN
Qty: 21 TABLET | Refills: 0 | Status: SHIPPED | OUTPATIENT
Start: 2022-04-18 | End: 2022-11-01

## 2022-04-18 NOTE — PROGRESS NOTES
"Chief Complaint  Knee Pain (Ongoing knee pain, much better in the left but killing her in the right side, she hasnt been able to sleep because of this.)    History of Present Illness    Knee pain  In both knees  Started about 8 days ago.   She did not have injury.   She had xrays done a few days ago which were normal.   She has tried aleve which helps \" off and on\" but has only been taking every 12 hours.   Says intensity of pain   The pain worsened after she finished a course of steroids for a different issue.   Walking does not make the pain worse.     The following portions of the patient's history were reviewed and updated as appropriate: allergies, current medications, past family history, past medical history, past social history, past surgical history, and problem list.    OBJECTIVE:  /84 (BP Location: Right arm, Patient Position: Sitting, Cuff Size: Adult)   Pulse 74   Temp 98 °F (36.7 °C) (Temporal)   Resp 16   Ht 154.9 cm (61\")   Wt 96.1 kg (211 lb 12.8 oz)   SpO2 99%   BMI 40.02 kg/m²       Physical Exam  Musculoskeletal:      Comments: Both knees no redness warmth edema or rash. No effusions. rom testing normal. Bilateral anterior and posterior drawer negative. No patellar tenderness. medial or lateral stress both knees negative. She has generalized tenderness of the knees.         lower extremity pulses and sensations are intact.               Assessment and Plan   Diagnoses and all orders for this visit:    1. Acute pain of both knees (Primary)  -     ibuprofen (ADVIL,MOTRIN) 600 MG tablet; Take 1 tablet by mouth Every 8 (Eight) Hours As Needed for Mild Pain .  Dispense: 21 tablet; Refill: 0  -     Diclofenac Sodium (VOLTAREN) 1 % gel gel; Apply 4 g topically to the appropriate area as directed 4 (Four) Times a Day As Needed (Pain in hands).  Dispense: 1 g; Refill: 0  -     Ambulatory Referral to Orthopedic Surgery  -     Uric Acid; Future  -     Basic metabolic panel; Future      She " agrees to call her physical therapist to begin pt, declines referral. Reviewed xray result. Will give 1 week of ibuprofen and follow up with orthopedic surgeon. She declines a history of bleeding or kidney problems. Reviewed negative rf, edmond. Bilateral unlikely for gout, but she would like her gout level checked.     Return if symptoms worsen or fail to improve.       Miracle Fletcher D.O.  [facility] [place]

## 2022-04-19 LAB
ANION GAP SERPL CALCULATED.3IONS-SCNC: 14.5 MMOL/L (ref 5–15)
BUN SERPL-MCNC: 11 MG/DL (ref 6–20)
BUN/CREAT SERPL: 14.3 (ref 7–25)
CALCIUM SPEC-SCNC: 9.5 MG/DL (ref 8.6–10.5)
CHLORIDE SERPL-SCNC: 101 MMOL/L (ref 98–107)
CO2 SERPL-SCNC: 25.5 MMOL/L (ref 22–29)
CREAT SERPL-MCNC: 0.77 MG/DL (ref 0.57–1)
EGFRCR SERPLBLD CKD-EPI 2021: 97.7 ML/MIN/1.73
GLUCOSE SERPL-MCNC: 90 MG/DL (ref 65–99)
POTASSIUM SERPL-SCNC: 3.4 MMOL/L (ref 3.5–5.2)
SODIUM SERPL-SCNC: 141 MMOL/L (ref 136–145)
URATE SERPL-MCNC: 5 MG/DL (ref 2.4–5.7)

## 2022-04-19 RX ORDER — POTASSIUM CHLORIDE 750 MG/1
10 TABLET, FILM COATED, EXTENDED RELEASE ORAL DAILY
Qty: 2 TABLET | Refills: 0 | Status: SHIPPED | OUTPATIENT
Start: 2022-04-19 | End: 2022-05-05

## 2022-04-25 RX ORDER — FERROUS SULFATE 325(65) MG
TABLET ORAL
Qty: 180 TABLET | Refills: 1 | OUTPATIENT
Start: 2022-04-25

## 2022-05-05 ENCOUNTER — OFFICE VISIT (OUTPATIENT)
Dept: ORTHOPEDIC SURGERY | Facility: CLINIC | Age: 47
End: 2022-05-05

## 2022-05-05 VITALS
DIASTOLIC BLOOD PRESSURE: 82 MMHG | SYSTOLIC BLOOD PRESSURE: 120 MMHG | WEIGHT: 211.86 LBS | HEIGHT: 61 IN | BODY MASS INDEX: 40 KG/M2

## 2022-05-05 DIAGNOSIS — M25.562 ACUTE PAIN OF BOTH KNEES: Primary | ICD-10-CM

## 2022-05-05 DIAGNOSIS — M25.561 ACUTE PAIN OF BOTH KNEES: Primary | ICD-10-CM

## 2022-05-05 DIAGNOSIS — M22.40 CHONDROMALACIA OF PATELLA, UNSPECIFIED LATERALITY: ICD-10-CM

## 2022-05-05 DIAGNOSIS — M17.0 PRIMARY OSTEOARTHRITIS OF BOTH KNEES: ICD-10-CM

## 2022-05-05 PROCEDURE — 99204 OFFICE O/P NEW MOD 45 MIN: CPT | Performed by: ORTHOPAEDIC SURGERY

## 2022-05-05 RX ORDER — FAMOTIDINE 20 MG/1
20 TABLET, FILM COATED ORAL DAILY
COMMUNITY

## 2022-05-05 NOTE — PROGRESS NOTES
[facility] [place] [place] [facility] Surgery Clinic Note        Subjective     Pain and Initial Evaluation of the Right Knee and Pain and Initial Evaluation of the Left Knee      HPI  Mariusz Yepez is a 46 y.o. female who presents with new problem of: bilateral knee pain.  Onset: atraumatic and gradual in nature. The issue has been ongoing for 3 week(s). Pain is a 6/10 on the pain scale. Pain is described as aching and burning. Associated symptoms include popping. The pain is worse with standing, sitting, climbing stairs and driving-getting out of car; pain medication and/or NSAID improve the pain. Previous treatments have included: NSAIDS and physical therapy.    I have reviewed the following portions of the patient's history:History of Present Illness and review of systems.      Patient here today for new problem day regarding her knees.  This began as she was having a rash and took oral steroids.  A few days after her steroid pack she went upstairs and had a burning sensation in the anterior aspect of both knees.  This was 4/11/2022.  She feels like things are getting a little bit better but overall she still has pain anterolaterally.  She has difficulty going down steps more than up steps.  She has tried anti-inflammatories without a lot of improvement.      Past Medical History:   Diagnosis Date   • Asthma     Allergy induced   • Dysphagia    • Esophageal reflux    • Fibroids    • History of mammography, screening 09/06/2017    Dr. Barraza   • History of vitamin D deficiency    • Hypertension    • Pap smear for cervical cancer screening 2016    Dr. Hanna    • Polycystic ovaries    • Screening breast examination     denies   • Thyroid nodule       Past Surgical History:   Procedure Laterality Date   • CHOLECYSTECTOMY  11/30/2017   • COLONOSCOPY  04/17/2017   • ENDOSCOPY  12/30/2013    esophagitis    • UMBILICAL HERNIA REPAIR        Family History   Problem Relation Age of Onset   • Hypertension Mother    • Diabetes Mother     • Pancreatic cancer Maternal Grandmother    • Hypertension Maternal Grandmother    • Glaucoma Maternal Grandfather    • Diabetes Maternal Grandfather    • Hypertension Maternal Grandfather    • Stroke Paternal Grandfather    • Hypertension Paternal Grandfather    • Glaucoma Paternal Grandmother    • Hypertension Paternal Grandmother    • Colon cancer Other      Social History     Socioeconomic History   • Marital status: Single   Tobacco Use   • Smoking status: Never Smoker   • Smokeless tobacco: Never Used   Vaping Use   • Vaping Use: Never used   Substance and Sexual Activity   • Alcohol use: Yes     Alcohol/week: 2.0 standard drinks     Types: 1 Glasses of wine, 1 Cans of beer per week     Comment: Less than 1 per week   • Drug use: No   • Sexual activity: Not Currently     Partners: Male     Birth control/protection: Abstinence      Current Outpatient Medications on File Prior to Visit   Medication Sig Dispense Refill   • albuterol (PROVENTIL HFA;VENTOLIN HFA) 108 (90 Base) MCG/ACT inhaler Inhale 2 puffs Every 4 (Four) Hours As Needed for Wheezing. 1 inhaler 2   • azelastine (ASTELIN) 0.1 % nasal spray INSTILL 2 SPRAYS IN EACH NOSTRIL AS DIRECTED BY PROVIDER TWICE DAILY 30 mL 11   • Cholecalciferol (VITAMIN D3) 2000 UNITS tablet Take  by mouth.     • Diclofenac Sodium (VOLTAREN) 1 % gel gel Apply 4 g topically to the appropriate area as directed 4 (Four) Times a Day As Needed (Pain in hands). 1 g 0   • famotidine (PEPCID) 20 MG tablet Take 20 mg by mouth Daily.     • FeroSul 325 (65 Fe) MG tablet TAKE 1 TABLET BY MOUTH TWICE DAILY WITH FOOD 180 tablet 1   • fluticasone (FLONASE) 50 MCG/ACT nasal spray INSTILL 2 SPRAYS IN EACH NOSTRIL AS DIRECTED BY PROVIDER DAILY 48 g 2   • hydroCHLOROthiazide (HYDRODIURIL) 12.5 MG tablet Take 1 tablet by mouth Daily. 90 tablet 3   • ibuprofen (ADVIL,MOTRIN) 600 MG tablet Take 1 tablet by mouth Every 8 (Eight) Hours As Needed for Mild Pain . 21 tablet 0   • lamoTRIgine  "(LaMICtal) 25 MG tablet Take 1 tablet by mouth 2 (Two) Times a Day. 60 tablet 3   • levocetirizine (XYZAL) 5 MG tablet Take 1 tablet by mouth Every Evening. 90 tablet 3   • Lo Loestrin Fe 1 MG-10 MCG / 10 MCG tablet TAKE 1 TABLET BY MOUTH EVERY DAY 84 tablet 3   • metFORMIN ER (GLUCOPHAGE-XR) 500 MG 24 hr tablet Take 1 tablet by mouth 2 (Two) Times a Day. 180 tablet 3   • montelukast (SINGULAIR) 10 MG tablet Take 1 tablet by mouth every night at bedtime. 90 tablet 1   • nebivolol (BYSTOLIC) 10 MG tablet Take 1 tablet by mouth Daily. 90 tablet 3   • Omega-3 Fatty Acids (FISH OIL) 1200 MG capsule capsule Take 1,200 mg by mouth Daily.     • omeprazole (priLOSEC) 40 MG capsule TAKE 1 CAPSULE BY MOUTH TWICE DAILY 180 capsule 1   • Probiotic Product (PROBIOTIC-10 PO) Take  by mouth. [employer] Probiotic Capsules     • QVAR REDIHALER 80 MCG/ACT inhaler INHALE 1 PUFF BY MOUTH TWICE DAILY 10.6 g 5   • [DISCONTINUED] cyanocobalamin 1000 MCG/ML injection B12     • [DISCONTINUED] diazePAM (VALIUM) 5 MG tablet diazepam 5 mg tablet   Take 2 tablets as needed by oral route.     • [DISCONTINUED] diphenhydrAMINE (BENADRYL) 50 MG capsule Take 1 capsule by mouth 3 (Three) Times a Day As Needed for Itching or Allergies. 30 capsule 0   • [DISCONTINUED] Melatonin 3 MG tablet Take  by mouth Every Night.     • [DISCONTINUED] methylPREDNISolone (MEDROL) 4 MG dose pack Take as directed on package instructions. 21 tablet 0   • [DISCONTINUED] Needle, Disp, (BD SafetyGlide Needle) 25G X 5/8\" misc BD SafetyGlide Needle 25 x 5/8\"   USE AS DIRECTED WITH BEFORE-12 INJECTIONS     • [DISCONTINUED] potassium chloride (KLOR-CON) 10 MEQ CR tablet Take 1 tablet by mouth Daily. 2 tablet 0   • [DISCONTINUED] Syringe/Needle, Disp, (B-D SYRINGE/NEEDLE 1CC/25GX5/8) 25G X 5/8\" 1 ML misc Use as directed with B12 injections 50 each 5     No current facility-administered medications on file prior to visit.      Allergies   Allergen Reactions   • " "Biaxin [Clarithromycin]      dizziness   • Lopressor [Metoprolol Tartrate]      Fatigue     • Norvasc [Amlodipine Besylate]      Constipation    • Phenergan [Promethazine Hcl] Shortness Of Breath   • Codeine Nausea And Vomiting   • Carbamazepine Rash          Review of Systems   Constitutional: Negative.    HENT: Negative.    Eyes: Negative.    Respiratory: Negative.    Cardiovascular: Negative.    Gastrointestinal: Negative.    Endocrine: Negative.    Genitourinary: Negative.    Musculoskeletal: Positive for arthralgias and gait problem.   Skin: Negative.    Allergic/Immunologic: Negative.    Hematological: Negative.    Psychiatric/Behavioral: Negative.         I reviewed the patient's chief complaint, history of present illness, review of systems, past medical history, surgical history, family history, social history, medications and allergy list.        Objective      Physical Exam  /82   Ht 154.9 cm (60.98\")   Wt 96.1 kg (211 lb 13.8 oz)   BMI 40.05 kg/m²     Body mass index is 40.05 kg/m².    General  Mental Status - alert  General Appearance - cooperative, well groomed, not in acute distress  Orientation - Oriented X3  Build & Nutrition - well developed and well nourished  Posture - normal posture  Gait - normal gait       Ortho Exam  Bilateral knees: She has no effusion bilaterally.  She is tender over the patella tendon bilaterally.  There is some crepitance ballottement of the patella but no significant irritability.  No medial lateral joint line tenderness.  No instability noted.  Full range of motion.    Imaging/Studies  Imaging Results (Last 24 Hours)     ** No results found for the last 24 hours. **        XR Knee 3 View Bilateral  Narrative: DATE OF EXAM: 4/13/2022 4:30 PM     PROCEDURE: XR KNEE 3 VW BILATERAL-     INDICATIONS: new burning pain bilateral knees without injury;  M25.561-Pain in right knee; M25.562-Pain in left knee     COMPARISON: No comparisons available.     TECHNIQUE: AP and " sunrise views of the bilateral knees, lateral view of  the right knee, lateral view of the left knee     FINDINGS:  No soft tissue swelling. No knee joint effusion. No acute fracture or  malalignment. Joint spaces appear grossly preserved without significant  degenerative change.      Impression: Normal appearance of the knees.     This report was finalized on 4/13/2022 5:32 PM by Jelani Lyn MD.     Reviewed images and report of the x-ray as above.  Patient appears to have mild medial and patellofemoral compartment joint space narrowing.  No acute bony abnormality noted.    Assessment    Assessment:  1. Acute pain of both knees    2. Primary osteoarthritis of both knees    3. Chondromalacia of patella, unspecified laterality        Plan:  1. Continue over-the-counter medication as needed for discomfort  2. Acute pain bilateral knees in the face of mild medial and patellofemoral compartment osteoarthritis and patellofemoral chondromalacia--patient again may have been more susceptible to injury due to her steroid use recently.  This could include articular cartilage and tendon and ligament.  Plan will be for physical therapy.  She is doing this at [facility] in [facility].  I would recommend iontophoresis over the patella tendon and close chain VMO strengthening.  See her back in 6 weeks if she is not a lot better, consider advanced imaging.        Christo García MD  05/05/22  11:50 EDT      Dictated Utilizing Dragon Dictation.

## 2022-05-16 ENCOUNTER — APPOINTMENT (OUTPATIENT)
Dept: MAMMOGRAPHY | Facility: HOSPITAL | Age: 47
End: 2022-05-16

## 2022-05-17 ENCOUNTER — APPOINTMENT (OUTPATIENT)
Dept: MAMMOGRAPHY | Facility: HOSPITAL | Age: 47
End: 2022-05-17

## 2022-05-23 ENCOUNTER — HOSPITAL ENCOUNTER (OUTPATIENT)
Dept: MAMMOGRAPHY | Facility: HOSPITAL | Age: 47
Discharge: HOME | End: 2022-05-23
Admitting: OBSTETRICS & GYNECOLOGY

## 2022-05-23 DIAGNOSIS — Z12.39 ENCOUNTER FOR BREAST CANCER SCREENING USING NON-MAMMOGRAM MODALITY: ICD-10-CM

## 2022-05-23 PROCEDURE — 77067 SCR MAMMO BI INCL CAD: CPT | Performed by: RADIOLOGY

## 2022-05-23 PROCEDURE — 77063 BREAST TOMOSYNTHESIS BI: CPT | Performed by: RADIOLOGY

## 2022-05-23 PROCEDURE — 77063 BREAST TOMOSYNTHESIS BI: CPT

## 2022-05-23 PROCEDURE — 77067 SCR MAMMO BI INCL CAD: CPT

## 2022-06-16 ENCOUNTER — OFFICE VISIT (OUTPATIENT)
Dept: ORTHOPEDIC SURGERY | Facility: CLINIC | Age: 47
End: 2022-06-16

## 2022-06-16 VITALS
DIASTOLIC BLOOD PRESSURE: 80 MMHG | BODY MASS INDEX: 39.65 KG/M2 | SYSTOLIC BLOOD PRESSURE: 120 MMHG | HEIGHT: 61 IN | WEIGHT: 210 LBS

## 2022-06-16 DIAGNOSIS — M25.562 ACUTE PAIN OF BOTH KNEES: Primary | ICD-10-CM

## 2022-06-16 DIAGNOSIS — M25.561 ACUTE PAIN OF BOTH KNEES: Primary | ICD-10-CM

## 2022-06-16 DIAGNOSIS — M17.0 PRIMARY OSTEOARTHRITIS OF BOTH KNEES: ICD-10-CM

## 2022-06-16 PROCEDURE — 99213 OFFICE O/P EST LOW 20 MIN: CPT | Performed by: PHYSICIAN ASSISTANT

## 2022-06-16 NOTE — PROGRESS NOTES
"    [facility] [place] [facility] Note        Subjective     CC: Follow-up (6 week recheck - Primary osteoarthritis of both knees )      CIRO Yepez is a 46 y.o. female. Patient returns today for follow up bilateral knee pain. Has participated in formal PT and been discharged due to resolution of symptoms.    Pain scale: 0/10.     Overall, patient's symptoms are improved.    ROS:    Constiutional:Pt denies fever, chills, nausea, or vomiting.  MSK:as above        Objective      Past Medical History  Past Medical History:   Diagnosis Date   • Asthma     Allergy induced   • Dysphagia    • Esophageal reflux    • Fibroids    • History of mammography, screening 09/06/2017    Dr. Barraza   • History of vitamin D deficiency    • Hypertension    • Pap smear for cervical cancer screening 2016    Dr. Hanna    • Polycystic ovaries    • Screening breast examination     denies   • Thyroid nodule          Physical Exam  /80   Ht 154.9 cm (60.98\")   Wt 95.3 kg (210 lb)   BMI 39.70 kg/m²     Body mass index is 39.7 kg/m².    Patient is well nourished and well developed.        Ortho Exam  Bilateral Knee  Skin: Intact without any erythema, warmth, swelling or evidence of infection.  Motion: FROM.  Tenderness: None today.  Instability: ACL/PCL/MCL/LCL stable and intact on exam.  Motor/sensory: Grossly intact L2-S1.      Imaging/Labs/EMG Reviewed:  No new imaging today.      Assessment:  1. Acute pain of both knees    2. Primary osteoarthritis of both knees        Plan:  1. Pain bilateral knees resolved with PT.  2. Bilateral knee OA--continue with observation for now.  3. Recommend OTC pain medication as needed.  4. Follow up as needed.      Deysi Ellis PA-C  06/22/22  09:36 EDT      Dictated Utilizing Dragon Dictation.  "

## 2022-07-07 ENCOUNTER — TELEPHONE (OUTPATIENT)
Dept: FAMILY MEDICINE CLINIC | Facility: CLINIC | Age: 47
End: 2022-07-07

## 2022-07-07 NOTE — TELEPHONE ENCOUNTER
Caller: Mariusz Yepez    Relationship: Self    Best call back number: [phone]    What is the best time to reach you: ANYTIME     Who are you requesting to speak with (clinical staff, provider,  specific staff member): CLINICAL STAFF     What was the call regarding: PATIENT STATES THAT SHE HAS TESTED POSITIVE FOR COVID. SHE IS WONDERING IF SHE QUALIFIES FOR PAXLOVID DUE TO HER PREVIOUS CONDITIONS.     Do you require a callback: YES

## 2022-07-07 NOTE — TELEPHONE ENCOUNTER
PATIENT CALLED BACK HUB TRIED TO WARM TRANSFER BUT WAS UNABLE TO GET OFFICE ON PHONE; PLEASE CALL WHEN AVAILABLE

## 2022-08-26 ENCOUNTER — DOCUMENTATION (OUTPATIENT)
Dept: OBSTETRICS AND GYNECOLOGY | Facility: CLINIC | Age: 47
End: 2022-08-26

## 2022-08-26 RX ORDER — NORETHINDRONE ACETATE AND ETHINYL ESTRADIOL, ETHINYL ESTRADIOL AND FERROUS FUMARATE 1MG-10(24)
KIT ORAL
Qty: 60 TABLET | Refills: 0 | Status: SHIPPED | OUTPATIENT
Start: 2022-08-26 | End: 2022-10-17

## 2022-08-26 NOTE — PROGRESS NOTES
Patients last appt 9/28/2021. Patient is scheduled for an appt 9/29/2022. Sent refill until patients appt.    FADIA Ngo

## 2022-09-29 ENCOUNTER — OFFICE VISIT (OUTPATIENT)
Dept: OBSTETRICS AND GYNECOLOGY | Facility: CLINIC | Age: 47
End: 2022-09-29

## 2022-09-29 VITALS
WEIGHT: 216 LBS | HEIGHT: 61 IN | SYSTOLIC BLOOD PRESSURE: 120 MMHG | BODY MASS INDEX: 40.78 KG/M2 | DIASTOLIC BLOOD PRESSURE: 82 MMHG

## 2022-09-29 DIAGNOSIS — Z12.39 ENCOUNTER FOR BREAST CANCER SCREENING USING NON-MAMMOGRAM MODALITY: ICD-10-CM

## 2022-09-29 DIAGNOSIS — Z30.41 ENCOUNTER FOR SURVEILLANCE OF CONTRACEPTIVE PILLS: ICD-10-CM

## 2022-09-29 DIAGNOSIS — D25.1 INTRAMURAL LEIOMYOMA OF UTERUS: ICD-10-CM

## 2022-09-29 DIAGNOSIS — Z01.419 WOMEN'S ANNUAL ROUTINE GYNECOLOGICAL EXAMINATION: Primary | ICD-10-CM

## 2022-09-29 PROCEDURE — 99396 PREV VISIT EST AGE 40-64: CPT | Performed by: OBSTETRICS & GYNECOLOGY

## 2022-09-29 NOTE — PROGRESS NOTES
Gynecologic Annual Exam Note          GYN Annual Exam     Gynecologic Exam        Subjective     HPI  Mariusz Yepez is a 47 y.o. female, , who presents for annual well woman exam as a established patient . No LMP recorded. (Menstrual status: Oral contraceptives). Periods are absent secondary to birth control. Patient reports problems with: none.  Partner Status: Marital Status: single. She is is not currently sexually active. STD testing recommendations have been explained to the patient and she does not desire STD testing. There were no changes to her medical or surgical history since her last visit..       Additional OB/GYN History   Current contraception: contraceptive methods: OCP (estrogen/progesterone)  Desires to: continue contraception    Last Pap : 2019. Result: negative. HPV: negative  Last Completed Pap Smear     This patient has no relevant Health Maintenance data.        History of abnormal Pap smear: no  Family history of uterine, colon, breast, or ovarian cancer: yes - colon CA on paternal side  Performs monthly Self-Breast Exam: no  Last mammogram: 2022. Done at .  Last Completed Mammogram          MAMMOGRAM (Yearly) Next due on 2022  Mammo Screening Digital Tomosynthesis Bilateral With CAD    2021  Mammo Screening Digital Tomosynthesis Bilateral With CAD    2020  Mammo Screening Digital Tomosynthesis Bilateral With CAD    2018  Mammo Screening Digital Tomosynthesis Bilateral With CAD    2017  Mammo Screening Digital Tomosynthesis Bilateral With CAD                History of abnormal mammogram: no    Colonoscopy: has had a colonoscopy 5 year(s) ago.  Exercises Regularly: Yes  Feelings of Anxiety or Depression: no  Tobacco Usage?: No       Current Outpatient Medications:   •  albuterol (PROVENTIL HFA;VENTOLIN HFA) 108 (90 Base) MCG/ACT inhaler, Inhale 2 puffs Every 4 (Four) Hours As Needed for Wheezing., Disp: 1 inhaler,  Rfl: 2  •  azelastine (ASTELIN) 0.1 % nasal spray, INSTILL 2 SPRAYS IN EACH NOSTRIL AS DIRECTED BY PROVIDER TWICE DAILY, Disp: 30 mL, Rfl: 11  •  Cholecalciferol (VITAMIN D3) 2000 UNITS tablet, Take  by mouth., Disp: , Rfl:   •  famotidine (PEPCID) 20 MG tablet, Take 20 mg by mouth Daily., Disp: , Rfl:   •  FeroSul 325 (65 Fe) MG tablet, TAKE 1 TABLET BY MOUTH TWICE DAILY WITH FOOD, Disp: 180 tablet, Rfl: 1  •  fluticasone (FLONASE) 50 MCG/ACT nasal spray, INSTILL 2 SPRAYS IN EACH NOSTRIL AS DIRECTED BY PROVIDER DAILY, Disp: 48 g, Rfl: 2  •  hydroCHLOROthiazide (HYDRODIURIL) 12.5 MG tablet, Take 1 tablet by mouth Daily., Disp: 90 tablet, Rfl: 3  •  ibuprofen (ADVIL,MOTRIN) 600 MG tablet, Take 1 tablet by mouth Every 8 (Eight) Hours As Needed for Mild Pain ., Disp: 21 tablet, Rfl: 0  •  lamoTRIgine (LaMICtal) 25 MG tablet, Take 1 tablet by mouth 2 (Two) Times a Day., Disp: 60 tablet, Rfl: 3  •  levocetirizine (XYZAL) 5 MG tablet, Take 1 tablet by mouth Every Evening., Disp: 90 tablet, Rfl: 3  •  Lo Loestrin Fe 1 MG-10 MCG / 10 MCG tablet, TAKE 1 TABLET BY MOUTH EVERY DAY, Disp: 60 tablet, Rfl: 0  •  metFORMIN ER (GLUCOPHAGE-XR) 500 MG 24 hr tablet, Take 1 tablet by mouth 2 (Two) Times a Day., Disp: 180 tablet, Rfl: 3  •  montelukast (SINGULAIR) 10 MG tablet, Take 1 tablet by mouth every night at bedtime., Disp: 90 tablet, Rfl: 1  •  nebivolol (BYSTOLIC) 10 MG tablet, Take 1 tablet by mouth Daily., Disp: 90 tablet, Rfl: 3  •  Omega-3 Fatty Acids (FISH OIL) 1200 MG capsule capsule, Take 1,200 mg by mouth Daily., Disp: , Rfl:   •  omeprazole (priLOSEC) 40 MG capsule, TAKE 1 CAPSULE BY MOUTH TWICE DAILY, Disp: 180 capsule, Rfl: 1  •  Probiotic Product (PROBIOTIC-10 PO), Take  by mouth. [employer] Probiotic Capsules, Disp: , Rfl:   •  QVAR REDIHALER 80 MCG/ACT inhaler, INHALE 1 PUFF BY MOUTH TWICE DAILY, Disp: 10.6 g, Rfl: 5     Patient is requesting refills of Lo Loestrin.    OB History        0     Para   0    Term   0       0    AB   0    Living   0       SAB   0    IAB   0    Ectopic   0    Molar   0    Multiple   0    Live Births   0                Past Medical History:   Diagnosis Date   • Asthma     Allergy induced   • Dysphagia    • Esophageal reflux    • Fibroids    • History of mammography, screening 2017    Dr. Barraza   • History of vitamin D deficiency    • Hypertension    • Pap smear for cervical cancer screening     Dr. Hanna    • Polycystic ovaries    • Screening breast examination     denies   • Thyroid nodule         Past Surgical History:   Procedure Laterality Date   • CHOLECYSTECTOMY  2017   • COLONOSCOPY  2017   • ENDOSCOPY  2013    esophagitis    • UMBILICAL HERNIA REPAIR         Health Maintenance   Topic Date Due   • Pneumococcal Vaccine 0-64 (1 - PCV) Never done   • COVID-19 Vaccine (5 - Booster for Pfizer series) 2021   • ANNUAL PHYSICAL  2022   • PAP SMEAR  2022   • INFLUENZA VACCINE  2022   • Annual Gynecologic Pelvic and Breast Exam  2022   • MAMMOGRAM  2023   • COLORECTAL CANCER SCREENING  2025   • TDAP/TD VACCINES (3 - Td or Tdap) 2027   • HEPATITIS C SCREENING  Completed       The additional following portions of the patient's history were reviewed and updated as appropriate: allergies, current medications, past family history, past medical history, past social history, past surgical history and problem list.    Review of Systems   Constitutional: Negative.    HENT: Negative.    Eyes: Negative.    Respiratory: Negative.    Cardiovascular: Negative.    Gastrointestinal: Negative.    Endocrine: Negative.    Genitourinary: Negative.    Musculoskeletal: Negative.    Skin: Negative.    Allergic/Immunologic: Negative.    Neurological: Negative.    Hematological: Negative.    Psychiatric/Behavioral: Negative.          I have reviewed and agree with the HPI, ROS, and historical information as entered above.  "Kadie Barraza MD      Objective   /82   Ht 154.9 cm (61\")   Wt 98 kg (216 lb)   BMI 40.81 kg/m²     Physical Exam  Vitals and nursing note reviewed. Exam conducted with a chaperone present.   Constitutional:       Appearance: She is well-developed.   HENT:      Head: Normocephalic and atraumatic.   Neck:      Thyroid: No thyroid mass or thyromegaly.   Cardiovascular:      Rate and Rhythm: Normal rate and regular rhythm.      Heart sounds: No murmur heard.  Pulmonary:      Effort: Pulmonary effort is normal. No retractions.      Breath sounds: Normal breath sounds. No wheezing, rhonchi or rales.   Chest:      Chest wall: No mass or tenderness.   Breasts:      Right: Normal. No mass, nipple discharge, skin change or tenderness.      Left: Normal. No mass, nipple discharge, skin change or tenderness.       Abdominal:      General: Bowel sounds are normal.      Palpations: Abdomen is soft. Abdomen is not rigid. There is no mass.      Tenderness: There is no abdominal tenderness. There is no guarding.      Hernia: No hernia is present. There is no hernia in the left inguinal area.   Genitourinary:     Labia:         Right: No rash, tenderness or lesion.         Left: No rash, tenderness or lesion.       Vagina: Normal. No vaginal discharge or lesions.      Cervix: No cervical motion tenderness, discharge, lesion or cervical bleeding.      Uterus: Normal. Not enlarged, not fixed and not tender.       Adnexa:         Right: No mass or tenderness.          Left: No mass or tenderness.        Rectum: No external hemorrhoid.   Musculoskeletal:      Cervical back: Normal range of motion. No muscular tenderness.   Neurological:      Mental Status: She is alert and oriented to person, place, and time.   Psychiatric:         Behavior: Behavior normal.            Assessment and Plan    Problem List Items Addressed This Visit    None     Visit Diagnoses     Women's annual routine gynecological examination    -  Primary "    Relevant Orders    LIQUID-BASED PAP SMEAR, [facility] ([facility])    Encounter for breast cancer screening using non-mammogram modality        Intramural leiomyoma of uterus        Relevant Orders    US Non-ob Transvaginal    Encounter for surveillance of contraceptive pills              1. GYN annual well woman exam.   2. Pap guidelines reviewed.  3. Recommended use of Vitamin D replacement and getting adequate calcium in her diet. (1500mg)  4. Reviewed monthly self breast exams.  Instructed to call with lumps, pain, or breast discharge.    5. Continue yearly mammography  6. Reviewed HPV guidelines.  7. Leiomyomata - stable.  Repeat u/s in 12 months  8. Return in about 1 year (around 9/29/2023) for US with Next Visit.     Kadie Barraza MD  09/29/2022

## 2022-10-05 LAB — REF LAB TEST METHOD: NORMAL

## 2022-10-17 RX ORDER — NORETHINDRONE ACETATE AND ETHINYL ESTRADIOL, ETHINYL ESTRADIOL AND FERROUS FUMARATE 1MG-10(24)
KIT ORAL
Qty: 56 TABLET | Refills: 11 | Status: SHIPPED | OUTPATIENT
Start: 2022-10-17

## 2022-11-01 ENCOUNTER — OFFICE VISIT (OUTPATIENT)
Dept: FAMILY MEDICINE CLINIC | Facility: CLINIC | Age: 47
End: 2022-11-01

## 2022-11-01 VITALS
OXYGEN SATURATION: 98 % | SYSTOLIC BLOOD PRESSURE: 118 MMHG | RESPIRATION RATE: 22 BRPM | DIASTOLIC BLOOD PRESSURE: 82 MMHG | BODY MASS INDEX: 40.67 KG/M2 | WEIGHT: 215.4 LBS | HEART RATE: 88 BPM | TEMPERATURE: 98.2 F | HEIGHT: 61 IN

## 2022-11-01 DIAGNOSIS — K21.9 GASTROESOPHAGEAL REFLUX DISEASE: ICD-10-CM

## 2022-11-01 DIAGNOSIS — Z76.0 MEDICATION REFILL: ICD-10-CM

## 2022-11-01 DIAGNOSIS — Z00.00 ANNUAL PHYSICAL EXAM: Primary | ICD-10-CM

## 2022-11-01 DIAGNOSIS — J45.20 MILD INTERMITTENT ASTHMA WITHOUT COMPLICATION: ICD-10-CM

## 2022-11-01 DIAGNOSIS — E28.2 PCOS (POLYCYSTIC OVARIAN SYNDROME): ICD-10-CM

## 2022-11-01 DIAGNOSIS — J30.9 ALLERGIC RHINITIS: ICD-10-CM

## 2022-11-01 DIAGNOSIS — I10 BENIGN ESSENTIAL HYPERTENSION: ICD-10-CM

## 2022-11-01 PROCEDURE — 90677 PCV20 VACCINE IM: CPT | Performed by: FAMILY MEDICINE

## 2022-11-01 PROCEDURE — 90471 IMMUNIZATION ADMIN: CPT | Performed by: FAMILY MEDICINE

## 2022-11-01 PROCEDURE — 99396 PREV VISIT EST AGE 40-64: CPT | Performed by: FAMILY MEDICINE

## 2022-11-01 RX ORDER — NEBIVOLOL 10 MG/1
10 TABLET ORAL DAILY
Qty: 90 TABLET | Refills: 3 | Status: SHIPPED | OUTPATIENT
Start: 2022-11-01 | End: 2023-01-03

## 2022-11-01 RX ORDER — METFORMIN HYDROCHLORIDE 500 MG/1
1000 TABLET, EXTENDED RELEASE ORAL 2 TIMES DAILY
Qty: 180 TABLET | Refills: 3 | Status: SHIPPED | OUTPATIENT
Start: 2022-11-01

## 2022-11-01 RX ORDER — MONTELUKAST SODIUM 10 MG/1
10 TABLET ORAL
Qty: 90 TABLET | Refills: 3 | Status: SHIPPED | OUTPATIENT
Start: 2022-11-01

## 2022-11-01 RX ORDER — OMEPRAZOLE 40 MG/1
40 CAPSULE, DELAYED RELEASE ORAL 2 TIMES DAILY
Qty: 180 CAPSULE | Refills: 3 | Status: SHIPPED | OUTPATIENT
Start: 2022-11-01

## 2022-11-01 RX ORDER — HYDROCHLOROTHIAZIDE 12.5 MG/1
12.5 TABLET ORAL DAILY
Qty: 90 TABLET | Refills: 3 | Status: SHIPPED | OUTPATIENT
Start: 2022-11-01

## 2022-11-08 ENCOUNTER — CLINICAL SUPPORT (OUTPATIENT)
Dept: FAMILY MEDICINE CLINIC | Facility: CLINIC | Age: 47
End: 2022-11-08
Payer: COMMERCIAL

## 2022-11-08 ENCOUNTER — LAB (OUTPATIENT)
Dept: LAB | Facility: HOSPITAL | Age: 47
End: 2022-11-08

## 2022-11-08 DIAGNOSIS — Z00.00 ANNUAL PHYSICAL EXAM: ICD-10-CM

## 2022-11-08 LAB
25(OH)D3 SERPL-MCNC: 74.1 NG/ML (ref 30–100)
ALBUMIN SERPL-MCNC: 4.3 G/DL (ref 3.5–5.2)
ALBUMIN/GLOB SERPL: 1.3 G/DL
ALP SERPL-CCNC: 48 U/L (ref 39–117)
ALT SERPL W P-5'-P-CCNC: 14 U/L (ref 1–33)
ANION GAP SERPL CALCULATED.3IONS-SCNC: 11.2 MMOL/L (ref 5–15)
AST SERPL-CCNC: 19 U/L (ref 1–32)
BACTERIA UR QL AUTO: ABNORMAL /HPF
BASOPHILS # BLD AUTO: 0.07 10*3/MM3 (ref 0–0.2)
BASOPHILS NFR BLD AUTO: 0.7 % (ref 0–1.5)
BILIRUB SERPL-MCNC: 0.7 MG/DL (ref 0–1.2)
BILIRUB UR QL STRIP: NEGATIVE
BUN SERPL-MCNC: 13 MG/DL (ref 6–20)
BUN/CREAT SERPL: 15.3 (ref 7–25)
CALCIUM SPEC-SCNC: 9.9 MG/DL (ref 8.6–10.5)
CHLORIDE SERPL-SCNC: 100 MMOL/L (ref 98–107)
CHOLEST SERPL-MCNC: 255 MG/DL (ref 0–200)
CLARITY UR: ABNORMAL
CO2 SERPL-SCNC: 26.8 MMOL/L (ref 22–29)
COLOR UR: ABNORMAL
CREAT SERPL-MCNC: 0.85 MG/DL (ref 0.57–1)
DEPRECATED RDW RBC AUTO: 39 FL (ref 37–54)
EGFRCR SERPLBLD CKD-EPI 2021: 86.2 ML/MIN/1.73
EOSINOPHIL # BLD AUTO: 0.11 10*3/MM3 (ref 0–0.4)
EOSINOPHIL NFR BLD AUTO: 1.1 % (ref 0.3–6.2)
ERYTHROCYTE [DISTWIDTH] IN BLOOD BY AUTOMATED COUNT: 11.5 % (ref 12.3–15.4)
GLOBULIN UR ELPH-MCNC: 3.2 GM/DL
GLUCOSE SERPL-MCNC: 80 MG/DL (ref 65–99)
GLUCOSE UR STRIP-MCNC: NEGATIVE MG/DL
HBA1C MFR BLD: 5 % (ref 4.8–5.6)
HCT VFR BLD AUTO: 40.7 % (ref 34–46.6)
HDLC SERPL-MCNC: 68 MG/DL (ref 40–60)
HGB BLD-MCNC: 14.2 G/DL (ref 12–15.9)
HGB UR QL STRIP.AUTO: NEGATIVE
HYALINE CASTS UR QL AUTO: ABNORMAL /LPF
IMM GRANULOCYTES # BLD AUTO: 0.03 10*3/MM3 (ref 0–0.05)
IMM GRANULOCYTES NFR BLD AUTO: 0.3 % (ref 0–0.5)
KETONES UR QL STRIP: NEGATIVE
LDLC SERPL CALC-MCNC: 161 MG/DL (ref 0–100)
LDLC/HDLC SERPL: 2.33 {RATIO}
LEUKOCYTE ESTERASE UR QL STRIP.AUTO: ABNORMAL
LYMPHOCYTES # BLD AUTO: 2.92 10*3/MM3 (ref 0.7–3.1)
LYMPHOCYTES NFR BLD AUTO: 29.7 % (ref 19.6–45.3)
MCH RBC QN AUTO: 32.3 PG (ref 26.6–33)
MCHC RBC AUTO-ENTMCNC: 34.9 G/DL (ref 31.5–35.7)
MCV RBC AUTO: 92.5 FL (ref 79–97)
MONOCYTES # BLD AUTO: 0.52 10*3/MM3 (ref 0.1–0.9)
MONOCYTES NFR BLD AUTO: 5.3 % (ref 5–12)
NEUTROPHILS NFR BLD AUTO: 6.17 10*3/MM3 (ref 1.7–7)
NEUTROPHILS NFR BLD AUTO: 62.9 % (ref 42.7–76)
NITRITE UR QL STRIP: NEGATIVE
NRBC BLD AUTO-RTO: 0.1 /100 WBC (ref 0–0.2)
PH UR STRIP.AUTO: 5.5 [PH] (ref 5–8)
PLATELET # BLD AUTO: 299 10*3/MM3 (ref 140–450)
PMV BLD AUTO: 10.1 FL (ref 6–12)
POTASSIUM SERPL-SCNC: 3.2 MMOL/L (ref 3.5–5.2)
PROT SERPL-MCNC: 7.5 G/DL (ref 6–8.5)
PROT UR QL STRIP: ABNORMAL
RBC # BLD AUTO: 4.4 10*6/MM3 (ref 3.77–5.28)
RBC # UR STRIP: ABNORMAL /HPF
REF LAB TEST METHOD: ABNORMAL
SODIUM SERPL-SCNC: 138 MMOL/L (ref 136–145)
SP GR UR STRIP: 1.02 (ref 1–1.03)
SQUAMOUS #/AREA URNS HPF: ABNORMAL /HPF
TRIGL SERPL-MCNC: 144 MG/DL (ref 0–150)
TSH SERPL DL<=0.05 MIU/L-ACNC: 2.09 UIU/ML (ref 0.27–4.2)
UROBILINOGEN UR QL STRIP: ABNORMAL
VIT B12 BLD-MCNC: 286 PG/ML (ref 211–946)
VLDLC SERPL-MCNC: 26 MG/DL (ref 5–40)
WBC # UR STRIP: ABNORMAL /HPF
WBC NRBC COR # BLD: 9.82 10*3/MM3 (ref 3.4–10.8)

## 2022-11-08 PROCEDURE — 80061 LIPID PANEL: CPT

## 2022-11-08 PROCEDURE — 81001 URINALYSIS AUTO W/SCOPE: CPT

## 2022-11-08 PROCEDURE — 83036 HEMOGLOBIN GLYCOSYLATED A1C: CPT

## 2022-11-08 PROCEDURE — 80050 GENERAL HEALTH PANEL: CPT

## 2022-11-08 PROCEDURE — 82306 VITAMIN D 25 HYDROXY: CPT

## 2022-11-08 PROCEDURE — 82607 VITAMIN B-12: CPT

## 2022-11-08 PROCEDURE — 87086 URINE CULTURE/COLONY COUNT: CPT

## 2022-11-09 LAB — BACTERIA SPEC AEROBE CULT: NO GROWTH

## 2022-12-31 DIAGNOSIS — I10 BENIGN ESSENTIAL HYPERTENSION: ICD-10-CM

## 2023-01-03 RX ORDER — NEBIVOLOL 10 MG/1
10 TABLET ORAL DAILY
Qty: 90 TABLET | Refills: 3 | Status: SHIPPED | OUTPATIENT
Start: 2023-01-03

## 2023-01-23 DIAGNOSIS — J30.9 ALLERGIC RHINITIS: ICD-10-CM

## 2023-01-23 DIAGNOSIS — Z76.0 MEDICATION REFILL: ICD-10-CM

## 2023-01-24 RX ORDER — LEVOCETIRIZINE DIHYDROCHLORIDE 5 MG/1
5 TABLET, FILM COATED ORAL EVERY EVENING
Qty: 90 TABLET | Refills: 3 | Status: SHIPPED | OUTPATIENT
Start: 2023-01-24

## 2023-05-01 ENCOUNTER — LAB (OUTPATIENT)
Dept: LAB | Facility: HOSPITAL | Age: 48
End: 2023-05-01
Payer: COMMERCIAL

## 2023-05-01 ENCOUNTER — OFFICE VISIT (OUTPATIENT)
Dept: FAMILY MEDICINE CLINIC | Facility: CLINIC | Age: 48
End: 2023-05-01
Payer: COMMERCIAL

## 2023-05-01 VITALS
HEART RATE: 84 BPM | TEMPERATURE: 98.4 F | SYSTOLIC BLOOD PRESSURE: 116 MMHG | OXYGEN SATURATION: 98 % | DIASTOLIC BLOOD PRESSURE: 82 MMHG | WEIGHT: 222.8 LBS | BODY MASS INDEX: 42.12 KG/M2

## 2023-05-01 DIAGNOSIS — J30.9 ALLERGIC RHINITIS: ICD-10-CM

## 2023-05-01 DIAGNOSIS — I10 BENIGN ESSENTIAL HYPERTENSION: Primary | Chronic | ICD-10-CM

## 2023-05-01 DIAGNOSIS — T75.3XXA MOTION SICKNESS, INITIAL ENCOUNTER: ICD-10-CM

## 2023-05-01 DIAGNOSIS — E28.2 PCOS (POLYCYSTIC OVARIAN SYNDROME): ICD-10-CM

## 2023-05-01 DIAGNOSIS — R53.83 OTHER FATIGUE: ICD-10-CM

## 2023-05-01 PROCEDURE — 84466 ASSAY OF TRANSFERRIN: CPT

## 2023-05-01 PROCEDURE — 36415 COLL VENOUS BLD VENIPUNCTURE: CPT

## 2023-05-01 PROCEDURE — 85025 COMPLETE CBC W/AUTO DIFF WBC: CPT

## 2023-05-01 PROCEDURE — 82306 VITAMIN D 25 HYDROXY: CPT

## 2023-05-01 PROCEDURE — 83540 ASSAY OF IRON: CPT

## 2023-05-01 RX ORDER — SCOLOPAMINE TRANSDERMAL SYSTEM 1 MG/1
1 PATCH, EXTENDED RELEASE TRANSDERMAL
Qty: 10 EACH | Refills: 0 | Status: SHIPPED | OUTPATIENT
Start: 2023-05-01

## 2023-05-01 RX ORDER — DEXAMETHASONE 4 MG/1
TABLET ORAL
COMMUNITY
Start: 2023-04-12

## 2023-05-01 RX ORDER — LANSOPRAZOLE 30 MG/1
CAPSULE, DELAYED RELEASE ORAL
COMMUNITY
Start: 2023-04-14

## 2023-05-01 RX ORDER — FLUTICASONE PROPIONATE 50 MCG
2 SPRAY, SUSPENSION (ML) NASAL DAILY
Qty: 48 G | Refills: 11 | Status: SHIPPED | OUTPATIENT
Start: 2023-05-01

## 2023-05-01 NOTE — PROGRESS NOTES
Subjective   Mariusz Yepez is a 47 y.o. female.     History of Present Illness overall she reports that she has been feeling very well.  Her blood pressures been well controlled she is tolerating her medications without any side effects.  She is not currently needing refills of anything other than the Flonase.  Her allergies have been pretty well controlled with her current allergy regimen.  He does report that she has had more fatigue recently and in the past this has been a symptom she has had when her iron levels are off or her thyroid levels are off.    The following portions of the patient's history were reviewed and updated as appropriate: allergies, current medications, past family history, past medical history, past social history, past surgical history and problem list.    Review of Systems   Constitutional: Positive for fatigue. Negative for fever.   HENT: Negative.    Eyes: Negative.    Respiratory: Negative.    Cardiovascular: Negative.    Gastrointestinal: Negative.    Endocrine: Negative.    Genitourinary: Negative.    Musculoskeletal: Negative.    Skin: Negative.    Allergic/Immunologic: Negative.    Neurological: Negative.    Hematological: Negative.    Psychiatric/Behavioral: Negative.    All other systems reviewed and are negative.      Objective     Vitals:    05/01/23 1500   BP: 116/82   Pulse: 84   Temp: 98.4 °F (36.9 °C)   TempSrc: Infrared   SpO2: 98%   Weight: 101 kg (222 lb 12.8 oz)       Physical Exam  Vitals and nursing note reviewed.   Constitutional:       Appearance: She is well-developed.   HENT:      Head: Normocephalic and atraumatic.   Eyes:      General:         Right eye: No discharge.         Left eye: No discharge.      Pupils: Pupils are equal, round, and reactive to light.   Cardiovascular:      Rate and Rhythm: Normal rate and regular rhythm.      Heart sounds: Normal heart sounds.   Pulmonary:      Effort: Pulmonary effort is normal.      Breath sounds: Normal  breath sounds.   Abdominal:      General: Bowel sounds are normal.      Palpations: Abdomen is soft. There is no mass.      Tenderness: There is no abdominal tenderness.   Musculoskeletal:         General: Normal range of motion.      Right shoulder: No swelling.      Cervical back: Normal range of motion and neck supple.   Skin:     General: Skin is warm and dry.      Nails: There is no clubbing.   Neurological:      Mental Status: She is alert and oriented to person, place, and time.      Deep Tendon Reflexes: Reflexes are normal and symmetric.   Psychiatric:         Behavior: Behavior normal.         Thought Content: Thought content normal.         Judgment: Judgment normal.         Assessment & Plan     Problem List Items Addressed This Visit        Allergies and Adverse Reactions    Allergic rhinitis (Chronic)    Relevant Medications    fluticasone (FLONASE) 50 MCG/ACT nasal spray       Cardiac and Vasculature    Benign essential hypertension - Primary (Chronic)       Endocrine and Metabolic    PCOS (polycystic ovarian syndrome)   Other Visit Diagnoses     Other fatigue        Relevant Orders    CBC & Differential    Vitamin D,25-Hydroxy    Iron and TIBC    Motion sickness, initial encounter        Relevant Medications    Scopolamine 1 MG/3DAYS patch

## 2023-05-02 LAB
25(OH)D3 SERPL-MCNC: 85 NG/ML (ref 30–100)
BASOPHILS # BLD AUTO: 0.07 10*3/MM3 (ref 0–0.2)
BASOPHILS NFR BLD AUTO: 0.8 % (ref 0–1.5)
DEPRECATED RDW RBC AUTO: 40.3 FL (ref 37–54)
EOSINOPHIL # BLD AUTO: 0.3 10*3/MM3 (ref 0–0.4)
EOSINOPHIL NFR BLD AUTO: 3.5 % (ref 0.3–6.2)
ERYTHROCYTE [DISTWIDTH] IN BLOOD BY AUTOMATED COUNT: 11.7 % (ref 12.3–15.4)
HCT VFR BLD AUTO: 39.1 % (ref 34–46.6)
HGB BLD-MCNC: 12.9 G/DL (ref 12–15.9)
IMM GRANULOCYTES # BLD AUTO: 0.02 10*3/MM3 (ref 0–0.05)
IMM GRANULOCYTES NFR BLD AUTO: 0.2 % (ref 0–0.5)
IRON 24H UR-MRATE: 60 MCG/DL (ref 37–145)
IRON SATN MFR SERPL: 11 % (ref 20–50)
LYMPHOCYTES # BLD AUTO: 3.12 10*3/MM3 (ref 0.7–3.1)
LYMPHOCYTES NFR BLD AUTO: 35.9 % (ref 19.6–45.3)
MCH RBC QN AUTO: 31.2 PG (ref 26.6–33)
MCHC RBC AUTO-ENTMCNC: 33 G/DL (ref 31.5–35.7)
MCV RBC AUTO: 94.7 FL (ref 79–97)
MONOCYTES # BLD AUTO: 0.55 10*3/MM3 (ref 0.1–0.9)
MONOCYTES NFR BLD AUTO: 6.3 % (ref 5–12)
NEUTROPHILS NFR BLD AUTO: 4.62 10*3/MM3 (ref 1.7–7)
NEUTROPHILS NFR BLD AUTO: 53.3 % (ref 42.7–76)
NRBC BLD AUTO-RTO: 0 /100 WBC (ref 0–0.2)
PLATELET # BLD AUTO: 337 10*3/MM3 (ref 140–450)
PMV BLD AUTO: 10.6 FL (ref 6–12)
RBC # BLD AUTO: 4.13 10*6/MM3 (ref 3.77–5.28)
TIBC SERPL-MCNC: 541 MCG/DL (ref 298–536)
TRANSFERRIN SERPL-MCNC: 363 MG/DL (ref 200–360)
WBC NRBC COR # BLD: 8.68 10*3/MM3 (ref 3.4–10.8)

## 2023-05-02 NOTE — PROGRESS NOTES
The labs that we did in the office look great your vitamin D level is normal your red blood cell white blood cell counts look perfect your iron saturation level which is your overall stores is just slightly low but your iron levels are normal.  It would not hurt you to take an iron supplement daily just an over-the-counter supplement.

## 2023-08-18 DIAGNOSIS — E28.2 PCOS (POLYCYSTIC OVARIAN SYNDROME): ICD-10-CM

## 2023-08-18 DIAGNOSIS — Z76.0 MEDICATION REFILL: ICD-10-CM

## 2023-08-18 RX ORDER — METFORMIN HYDROCHLORIDE 500 MG/1
TABLET, EXTENDED RELEASE ORAL
Qty: 180 TABLET | Refills: 3 | Status: SHIPPED | OUTPATIENT
Start: 2023-08-18

## 2023-09-19 ENCOUNTER — HOSPITAL ENCOUNTER (OUTPATIENT)
Dept: MAMMOGRAPHY | Facility: HOSPITAL | Age: 48
Discharge: HOME | End: 2023-09-19
Admitting: OBSTETRICS & GYNECOLOGY
Payer: COMMERCIAL

## 2023-09-19 DIAGNOSIS — Z12.31 VISIT FOR SCREENING MAMMOGRAM: ICD-10-CM

## 2023-09-19 PROCEDURE — 77067 SCR MAMMO BI INCL CAD: CPT

## 2023-09-19 PROCEDURE — 77063 BREAST TOMOSYNTHESIS BI: CPT

## 2023-09-19 PROCEDURE — 77067 SCR MAMMO BI INCL CAD: CPT | Performed by: RADIOLOGY

## 2023-09-19 PROCEDURE — 77063 BREAST TOMOSYNTHESIS BI: CPT | Performed by: RADIOLOGY

## 2023-09-25 ENCOUNTER — HOSPITAL ENCOUNTER (OUTPATIENT)
Dept: MAMMOGRAPHY | Facility: HOSPITAL | Age: 48
Discharge: HOME | End: 2023-09-25
Payer: COMMERCIAL

## 2023-09-25 ENCOUNTER — HOSPITAL ENCOUNTER (OUTPATIENT)
Dept: ULTRASOUND IMAGING | Facility: HOSPITAL | Age: 48
Discharge: HOME | End: 2023-09-25
Payer: COMMERCIAL

## 2023-09-25 DIAGNOSIS — R92.8 ABNORMAL MAMMOGRAM OF LEFT BREAST: ICD-10-CM

## 2023-09-25 PROCEDURE — 76642 ULTRASOUND BREAST LIMITED: CPT | Performed by: RADIOLOGY

## 2023-09-25 PROCEDURE — 77061 BREAST TOMOSYNTHESIS UNI: CPT | Performed by: RADIOLOGY

## 2023-09-25 PROCEDURE — G0279 TOMOSYNTHESIS, MAMMO: HCPCS

## 2023-09-25 PROCEDURE — 77065 DX MAMMO INCL CAD UNI: CPT | Performed by: RADIOLOGY

## 2023-09-25 PROCEDURE — 77065 DX MAMMO INCL CAD UNI: CPT

## 2023-09-25 PROCEDURE — 76642 ULTRASOUND BREAST LIMITED: CPT

## 2023-09-27 DIAGNOSIS — Z76.0 MEDICATION REFILL: ICD-10-CM

## 2023-09-27 DIAGNOSIS — I10 BENIGN ESSENTIAL HYPERTENSION: ICD-10-CM

## 2023-09-27 RX ORDER — HYDROCHLOROTHIAZIDE 12.5 MG/1
12.5 TABLET ORAL DAILY
Qty: 90 TABLET | Refills: 3 | Status: SHIPPED | OUTPATIENT
Start: 2023-09-27

## 2023-10-12 ENCOUNTER — OFFICE VISIT (OUTPATIENT)
Dept: OBSTETRICS AND GYNECOLOGY | Facility: CLINIC | Age: 48
End: 2023-10-12
Payer: COMMERCIAL

## 2023-10-12 VITALS
BODY MASS INDEX: 37.99 KG/M2 | HEIGHT: 61 IN | WEIGHT: 201.2 LBS | DIASTOLIC BLOOD PRESSURE: 78 MMHG | SYSTOLIC BLOOD PRESSURE: 110 MMHG

## 2023-10-12 DIAGNOSIS — Z30.41 ENCOUNTER FOR SURVEILLANCE OF CONTRACEPTIVE PILLS: ICD-10-CM

## 2023-10-12 DIAGNOSIS — D25.1 INTRAMURAL LEIOMYOMA OF UTERUS: ICD-10-CM

## 2023-10-12 DIAGNOSIS — Z01.419 WOMEN'S ANNUAL ROUTINE GYNECOLOGICAL EXAMINATION: ICD-10-CM

## 2023-10-12 DIAGNOSIS — D21.9 FIBROIDS: Primary | ICD-10-CM

## 2023-10-12 DIAGNOSIS — Z12.39 ENCOUNTER FOR BREAST CANCER SCREENING USING NON-MAMMOGRAM MODALITY: ICD-10-CM

## 2023-10-12 DIAGNOSIS — E04.1 THYROID NODULE: ICD-10-CM

## 2023-10-12 RX ORDER — LIRAGLUTIDE 6 MG/ML
1.2 INJECTION, SOLUTION SUBCUTANEOUS
COMMUNITY
Start: 2023-09-11

## 2023-10-12 RX ORDER — NORETHINDRONE ACETATE AND ETHINYL ESTRADIOL, ETHINYL ESTRADIOL AND FERROUS FUMARATE 1MG-10(24)
1 KIT ORAL DAILY
Qty: 84 TABLET | Refills: 3 | Status: SHIPPED | OUTPATIENT
Start: 2023-10-12

## 2023-10-12 RX ORDER — BUDESONIDE 90 UG/1
AEROSOL, POWDER RESPIRATORY (INHALATION)
COMMUNITY
Start: 2023-09-10

## 2023-10-12 RX ORDER — FLURBIPROFEN SODIUM 0.3 MG/ML
SOLUTION/ DROPS OPHTHALMIC
COMMUNITY
Start: 2023-09-11

## 2023-10-12 NOTE — PROGRESS NOTES
Gynecologic Annual Exam Note          GYN Annual Exam     Gynecologic Exam (annual)        Subjective     HPI  Mariusz Yepez is a 48 y.o. female, , who presents for annual well woman exam as a established patient. No LMP recorded. (Menstrual status: Oral contraceptives).  Patient reports problems with: none.  Her periods are absent secondary to birth control. Partner Status: Marital Status: single. She is is not currently sexually active. STD testing recommendations have been explained to the patient and she does not desire STD testing. There were no changes to her medical or surgical history since her last visit..     US performed today to follow up on fibroids showing: Fibroid 1) 28.6 mm x 26.3 mm x 29.9 mm (anterior). 2) 22.4 mm x 18.6 mm x 20.2 mm (posterior).      Additional OB/GYN History   Current contraception: contraceptive methods: OCP (estrogen/progesterone)  Desires to: continue contraception    Last Pap : 2022. Result: negative. HPV: negative.   Last Completed Pap Smear            PAP SMEAR (Every 3 Years) Next due on 2022  LIQUID-BASED PAP SMEAR, [facility] ([facility])    2019  Done - normal with negative HPV testing    2016  Reason not specified    2012   Pap smear component of  PAP SMEAR                  History of abnormal Pap smear: no  Family history of uterine, colon, breast, or ovarian cancer: yes - Colon Ca- Paternal cousins  Performs monthly Self-Breast Exam: occasional  Last mammogram: 2023. Done at . Diagnostic mammogram on left breast on 23.  Last Completed Mammogram            Ordered - MAMMOGRAM (Yearly) Ordered on 10/12/2023      2023  Mammo Diagnostic Digital Tomosynthesis Left With CAD    2023  Mammo Screening Digital Tomosynthesis Bilateral With CAD    2022  Mammo Screening Digital Tomosynthesis Bilateral With CAD    2021  Mammo Screening Digital Tomosynthesis Bilateral With  CAD    02/17/2020  Mammo Screening Digital Tomosynthesis Bilateral With CAD    Only the first 5 history entries have been loaded, but more history exists.                    History of abnormal mammogram: yes - 09/19/23. Diagnostic mammogram on left breast on 09/25/23    Colonoscopy: has had a colonoscopy 3 year(s) ago.  Exercises Regularly: yes  Feelings of Anxiety or Depression: no  Tobacco Usage?: No       Current Outpatient Medications:     albuterol (PROVENTIL HFA;VENTOLIN HFA) 108 (90 Base) MCG/ACT inhaler, Inhale 2 puffs Every 4 (Four) Hours As Needed for Wheezing., Disp: 1 inhaler, Rfl: 2    azelastine (ASTELIN) 0.1 % nasal spray, INSTILL 2 SPRAYS IN EACH NOSTRIL AS DIRECTED BY PROVIDER TWICE DAILY, Disp: 30 mL, Rfl: 11    B-D UF III MINI PEN NEEDLES 31G X 5 MM misc, USE AS DIRECTED WITH PHILLIP, Disp: , Rfl:     Cholecalciferol (VITAMIN D3) 2000 UNITS tablet, Take  by mouth., Disp: , Rfl:     Diclofenac Sodium (VOLTAREN) 1 % gel gel, diclofenac 1 % topical gel, Disp: , Rfl:     fluticasone (FLONASE) 50 MCG/ACT nasal spray, 2 sprays into the nostril(s) as directed by provider Daily., Disp: 48 g, Rfl: 11    hydroCHLOROthiazide (HYDRODIURIL) 12.5 MG tablet, TAKE 1 TABLET BY MOUTH DAILY, Disp: 90 tablet, Rfl: 3    lamoTRIgine (LaMICtal) 25 MG tablet, Take 1 tablet by mouth 2 (Two) Times a Day. (Patient taking differently: Take 1 tablet by mouth 2 (Two) Times a Day. Patient takes 2 tablets once a day), Disp: 60 tablet, Rfl: 3    lansoprazole (PREVACID) 30 MG capsule, , Disp: , Rfl:     levocetirizine (XYZAL) 5 MG tablet, Take 1 tablet by mouth Every Evening., Disp: 90 tablet, Rfl: 3    Lo Loestrin Fe 1 MG-10 MCG / 10 MCG tablet, Take 1 tablet by mouth Daily., Disp: 84 tablet, Rfl: 3    metFORMIN ER (GLUCOPHAGE-XR) 500 MG 24 hr tablet, TAKE 2 TABLETS BY MOUTH TWICE DAILY (Patient taking differently: Patient takes 1 tablet twice.), Disp: 180 tablet, Rfl: 3    montelukast (SINGULAIR) 10 MG tablet, TAKE 1 TABLET BY  MOUTH EVERY NIGHT AT BEDTIME, Disp: 90 tablet, Rfl: 3    nebivolol (BYSTOLIC) 10 MG tablet, TAKE 1 TABLET BY MOUTH DAILY, Disp: 90 tablet, Rfl: 3    Omega-3 Fatty Acids (FISH OIL) 1200 MG capsule capsule, Take 1 capsule by mouth Daily., Disp: , Rfl:     Pulmicort Flexhaler 90 MCG/ACT inhaler, INHALE 1 PUFF BY MOUTH EVERY DAY. RINSE MOUTH AFTER USE, Disp: , Rfl:     Saxenda 18 MG/3ML injection pen, 1.2 mg., Disp: , Rfl:     Scopolamine 1 MG/3DAYS patch, Place 1 patch on the skin as directed by provider Every 72 (Seventy-Two) Hours., Disp: 10 each, Rfl: 0     Patient is requesting refills of Lo Loestrin Fe.    OB History          0    Para   0    Term   0       0    AB   0    Living   0         SAB   0    IAB   0    Ectopic   0    Molar   0    Multiple   0    Live Births   0                Past Medical History:   Diagnosis Date    Asthma     Allergy induced    Esophageal reflux     Fibroids     History of mammography, screening 2017    Dr. Barraza    History of vitamin D deficiency     Hypertension     Pap smear for cervical cancer screening     Dr. Hanna     Polycystic ovaries     Screening breast examination     denies    Thyroid nodule         Past Surgical History:   Procedure Laterality Date    CHOLECYSTECTOMY  2017    COLONOSCOPY  2017    ENDOSCOPY  2013    esophagitis     UMBILICAL HERNIA REPAIR         Health Maintenance   Topic Date Due    INFLUENZA VACCINE  2023    COVID-19 Vaccine (2023- season) 2023    Annual Gynecologic Pelvic and Breast Exam  2023    ANNUAL PHYSICAL  2023    BMI FOLLOWUP  2024    MAMMOGRAM  2024    COLORECTAL CANCER SCREENING  2025    PAP SMEAR  2025    TDAP/TD VACCINES (3 - Td or Tdap) 2027    HEPATITIS C SCREENING  Completed    Pneumococcal Vaccine 0-64  Completed       The additional following portions of the patient's history were reviewed and updated as appropriate: allergies, current  "medications, past family history, past medical history, past social history, past surgical history, and problem list.    Review of Systems   Constitutional: Negative.    HENT: Negative.     Eyes: Negative.    Respiratory: Negative.     Cardiovascular: Negative.    Gastrointestinal: Negative.    Endocrine: Negative.    Genitourinary: Negative.    Musculoskeletal: Negative.    Skin: Negative.    Allergic/Immunologic: Negative.    Neurological: Negative.    Hematological: Negative.    Psychiatric/Behavioral: Negative.           I have reviewed and agree with the HPI, ROS, and historical information as entered above. Kadie Barraza MD          Objective   /78   Ht 154.9 cm (61\")   Wt 91.3 kg (201 lb 3.2 oz)   BMI 38.02 kg/mý     Physical Exam  Vitals and nursing note reviewed. Exam conducted with a chaperone present.   Constitutional:       Appearance: She is well-developed.   HENT:      Head: Normocephalic and atraumatic.   Neck:      Thyroid: No thyroid mass or thyromegaly.      Comments: Left thyroid nodule  Cardiovascular:      Rate and Rhythm: Normal rate and regular rhythm.      Heart sounds: No murmur heard.  Pulmonary:      Effort: Pulmonary effort is normal. No retractions.      Breath sounds: Normal breath sounds. No wheezing, rhonchi or rales.   Chest:      Chest wall: No mass or tenderness.   Breasts:     Right: Normal. No mass, nipple discharge, skin change or tenderness.      Left: Normal. No mass, nipple discharge, skin change or tenderness.   Abdominal:      General: Bowel sounds are normal.      Palpations: Abdomen is soft. Abdomen is not rigid. There is no mass.      Tenderness: There is no abdominal tenderness. There is no guarding.      Hernia: No hernia is present. There is no hernia in the left inguinal area.   Genitourinary:     Labia:         Right: No rash, tenderness or lesion.         Left: No rash, tenderness or lesion.       Vagina: Normal. No vaginal discharge or lesions.      " Cervix: No cervical motion tenderness, discharge, lesion or cervical bleeding.      Uterus: Normal. Not enlarged, not fixed and not tender.       Adnexa:         Right: No mass or tenderness.          Left: No mass or tenderness.        Rectum: No external hemorrhoid.   Musculoskeletal:      Cervical back: Normal range of motion. No muscular tenderness.   Neurological:      Mental Status: She is alert and oriented to person, place, and time.   Psychiatric:         Behavior: Behavior normal.            Assessment and Plan    Problem List Items Addressed This Visit          Endocrine and Metabolic    Thyroid nodule    Relevant Medications    nebivolol (BYSTOLIC) 10 MG tablet    Other Relevant Orders    US Thyroid     Other Visit Diagnoses       Fibroids    -  Primary    Relevant Orders    US Non-ob Transvaginal (Completed)    Women's annual routine gynecological examination        Encounter for breast cancer screening using non-mammogram modality        Relevant Orders    Mammo Screening Digital Tomosynthesis Bilateral With CAD    Intramural leiomyoma of uterus        Encounter for surveillance of contraceptive pills                GYN annual well woman exam.   Pap guidelines reviewed.  Recommended use of Vitamin D replacement and getting adequate calcium in her diet. (1500mg)  Reviewed monthly self breast exams.  Instructed to call with lumps, pain, or breast discharge.    Continue yearly mammography  Reviewed exercise as a preventative health measures.   Thyroid nodule - will proceed with ultrasound and refer back to endocrine if necessary.  Leiomyomata - stable.  Repeat u/s in 12 months   Return in about 1 year (around 10/12/2024) for US with Next Visit.     Kadie Bararza MD  10/12/2023

## 2023-10-31 ENCOUNTER — HOSPITAL ENCOUNTER (OUTPATIENT)
Dept: ULTRASOUND IMAGING | Facility: HOSPITAL | Age: 48
Discharge: HOME | End: 2023-10-31
Admitting: OBSTETRICS & GYNECOLOGY
Payer: COMMERCIAL

## 2023-10-31 DIAGNOSIS — E04.1 THYROID NODULE: ICD-10-CM

## 2023-10-31 PROCEDURE — 76536 US EXAM OF HEAD AND NECK: CPT

## 2023-11-28 ENCOUNTER — OFFICE VISIT (OUTPATIENT)
Dept: FAMILY MEDICINE CLINIC | Facility: CLINIC | Age: 48
End: 2023-11-28
Payer: COMMERCIAL

## 2023-11-28 ENCOUNTER — LAB (OUTPATIENT)
Dept: LAB | Facility: HOSPITAL | Age: 48
End: 2023-11-28
Payer: COMMERCIAL

## 2023-11-28 VITALS
BODY MASS INDEX: 35.67 KG/M2 | TEMPERATURE: 98.4 F | WEIGHT: 188.8 LBS | RESPIRATION RATE: 18 BRPM | DIASTOLIC BLOOD PRESSURE: 80 MMHG | SYSTOLIC BLOOD PRESSURE: 110 MMHG | OXYGEN SATURATION: 98 % | HEART RATE: 100 BPM

## 2023-11-28 DIAGNOSIS — J30.9 ALLERGIC RHINITIS: ICD-10-CM

## 2023-11-28 DIAGNOSIS — E66.01 MORBIDLY OBESE: ICD-10-CM

## 2023-11-28 DIAGNOSIS — E28.2 PCOS (POLYCYSTIC OVARIAN SYNDROME): ICD-10-CM

## 2023-11-28 DIAGNOSIS — I10 BENIGN ESSENTIAL HYPERTENSION: Primary | Chronic | ICD-10-CM

## 2023-11-28 DIAGNOSIS — Z76.0 MEDICATION REFILL: ICD-10-CM

## 2023-11-28 DIAGNOSIS — I10 BENIGN ESSENTIAL HYPERTENSION: ICD-10-CM

## 2023-11-28 DIAGNOSIS — M25.50 ARTHRALGIA, UNSPECIFIED JOINT: ICD-10-CM

## 2023-11-28 DIAGNOSIS — E04.1 THYROID NODULE: ICD-10-CM

## 2023-11-28 DIAGNOSIS — M25.561 ACUTE BILATERAL KNEE PAIN: ICD-10-CM

## 2023-11-28 DIAGNOSIS — M25.562 ACUTE BILATERAL KNEE PAIN: ICD-10-CM

## 2023-11-28 LAB
25(OH)D3 SERPL-MCNC: 95.8 NG/ML (ref 30–100)
ALBUMIN SERPL-MCNC: 4.8 G/DL (ref 3.5–5.2)
ALBUMIN/GLOB SERPL: 1.5 G/DL
ALP SERPL-CCNC: 60 U/L (ref 39–117)
ALT SERPL W P-5'-P-CCNC: 15 U/L (ref 1–33)
ANION GAP SERPL CALCULATED.3IONS-SCNC: 16 MMOL/L (ref 5–15)
AST SERPL-CCNC: 24 U/L (ref 1–32)
BASOPHILS # BLD AUTO: 0.05 10*3/MM3 (ref 0–0.2)
BASOPHILS NFR BLD AUTO: 0.5 % (ref 0–1.5)
BILIRUB SERPL-MCNC: 0.5 MG/DL (ref 0–1.2)
BUN SERPL-MCNC: 16 MG/DL (ref 6–20)
BUN/CREAT SERPL: 20.3 (ref 7–25)
CALCIUM SPEC-SCNC: 10.3 MG/DL (ref 8.6–10.5)
CHLORIDE SERPL-SCNC: 99 MMOL/L (ref 98–107)
CHOLEST SERPL-MCNC: 234 MG/DL (ref 0–200)
CHROMATIN AB SERPL-ACNC: <10 IU/ML (ref 0–14)
CO2 SERPL-SCNC: 24 MMOL/L (ref 22–29)
CREAT SERPL-MCNC: 0.79 MG/DL (ref 0.57–1)
CRP SERPL-MCNC: 3.36 MG/DL (ref 0–0.5)
DEPRECATED RDW RBC AUTO: 41.3 FL (ref 37–54)
EGFRCR SERPLBLD CKD-EPI 2021: 93.6 ML/MIN/1.73
EOSINOPHIL # BLD AUTO: 0.16 10*3/MM3 (ref 0–0.4)
EOSINOPHIL NFR BLD AUTO: 1.6 % (ref 0.3–6.2)
ERYTHROCYTE [DISTWIDTH] IN BLOOD BY AUTOMATED COUNT: 11.8 % (ref 12.3–15.4)
ERYTHROCYTE [SEDIMENTATION RATE] IN BLOOD: 43 MM/HR (ref 0–20)
GLOBULIN UR ELPH-MCNC: 3.1 GM/DL
GLUCOSE SERPL-MCNC: 114 MG/DL (ref 65–99)
HBA1C MFR BLD: 4.7 % (ref 4.8–5.6)
HCT VFR BLD AUTO: 44.7 % (ref 34–46.6)
HDLC SERPL-MCNC: 46 MG/DL (ref 40–60)
HGB BLD-MCNC: 15.2 G/DL (ref 12–15.9)
IMM GRANULOCYTES # BLD AUTO: 0.03 10*3/MM3 (ref 0–0.05)
IMM GRANULOCYTES NFR BLD AUTO: 0.3 % (ref 0–0.5)
LDLC SERPL CALC-MCNC: 171 MG/DL (ref 0–100)
LDLC/HDLC SERPL: 3.66 {RATIO}
LYMPHOCYTES # BLD AUTO: 3 10*3/MM3 (ref 0.7–3.1)
LYMPHOCYTES NFR BLD AUTO: 29.9 % (ref 19.6–45.3)
MCH RBC QN AUTO: 32.5 PG (ref 26.6–33)
MCHC RBC AUTO-ENTMCNC: 34 G/DL (ref 31.5–35.7)
MCV RBC AUTO: 95.5 FL (ref 79–97)
MONOCYTES # BLD AUTO: 0.61 10*3/MM3 (ref 0.1–0.9)
MONOCYTES NFR BLD AUTO: 6.1 % (ref 5–12)
NEUTROPHILS NFR BLD AUTO: 6.2 10*3/MM3 (ref 1.7–7)
NEUTROPHILS NFR BLD AUTO: 61.6 % (ref 42.7–76)
NRBC BLD AUTO-RTO: 0 /100 WBC (ref 0–0.2)
PLATELET # BLD AUTO: 365 10*3/MM3 (ref 140–450)
PMV BLD AUTO: 10.1 FL (ref 6–12)
POTASSIUM SERPL-SCNC: 3.6 MMOL/L (ref 3.5–5.2)
PROT SERPL-MCNC: 7.9 G/DL (ref 6–8.5)
RBC # BLD AUTO: 4.68 10*6/MM3 (ref 3.77–5.28)
SODIUM SERPL-SCNC: 139 MMOL/L (ref 136–145)
TRIGL SERPL-MCNC: 98 MG/DL (ref 0–150)
TSH SERPL DL<=0.05 MIU/L-ACNC: 1.66 UIU/ML (ref 0.27–4.2)
VIT B12 BLD-MCNC: 824 PG/ML (ref 211–946)
VLDLC SERPL-MCNC: 17 MG/DL (ref 5–40)
WBC NRBC COR # BLD AUTO: 10.05 10*3/MM3 (ref 3.4–10.8)

## 2023-11-28 PROCEDURE — 80061 LIPID PANEL: CPT

## 2023-11-28 PROCEDURE — 82607 VITAMIN B-12: CPT

## 2023-11-28 PROCEDURE — 83036 HEMOGLOBIN GLYCOSYLATED A1C: CPT

## 2023-11-28 PROCEDURE — 36415 COLL VENOUS BLD VENIPUNCTURE: CPT

## 2023-11-28 PROCEDURE — 80050 GENERAL HEALTH PANEL: CPT

## 2023-11-28 PROCEDURE — 86038 ANTINUCLEAR ANTIBODIES: CPT

## 2023-11-28 PROCEDURE — 86140 C-REACTIVE PROTEIN: CPT

## 2023-11-28 PROCEDURE — 86431 RHEUMATOID FACTOR QUANT: CPT

## 2023-11-28 PROCEDURE — 82306 VITAMIN D 25 HYDROXY: CPT

## 2023-11-28 PROCEDURE — 85652 RBC SED RATE AUTOMATED: CPT

## 2023-11-28 RX ORDER — SEMAGLUTIDE 1.7 MG/.75ML
1.7 INJECTION, SOLUTION SUBCUTANEOUS DAILY
COMMUNITY
Start: 2023-11-07

## 2023-11-28 RX ORDER — LEVOCETIRIZINE DIHYDROCHLORIDE 5 MG/1
5 TABLET, FILM COATED ORAL EVERY EVENING
Qty: 90 TABLET | Refills: 3 | Status: SHIPPED | OUTPATIENT
Start: 2023-11-28

## 2023-11-28 RX ORDER — LANSOPRAZOLE 30 MG/1
30 CAPSULE, DELAYED RELEASE ORAL DAILY
Qty: 90 CAPSULE | Refills: 3 | Status: SHIPPED | OUTPATIENT
Start: 2023-11-28

## 2023-11-28 NOTE — PROGRESS NOTES
Subjective   Mariusz Yepez is a 48 y.o. female.     Hypertension     she is seeing [facility] [employer] for [employer] and has lost weight.      She is requesting a referral to podiatry and refill of voltaren.    She had labs and A1c was low she has been taking metformin 1 time a day.      Doing well with bp med.. she had lipid and cmp and A1c. She is reporting random pains in joints and feet.  Sometimes it hurt to touch parts of her body she runs low on b12 and vit d.      The following portions of the patient's history were reviewed and updated as appropriate: allergies, current medications, past family history, past medical history, past social history, past surgical history, and problem list.    Review of Systems   Constitutional: Negative.    HENT: Negative.     Eyes: Negative.    Respiratory: Negative.     Cardiovascular: Negative.    Gastrointestinal: Negative.    Endocrine: Negative.    Genitourinary: Negative.    Musculoskeletal:  Positive for arthralgias.   Skin: Negative.    Allergic/Immunologic: Negative.    Neurological: Negative.    Hematological: Negative.    Psychiatric/Behavioral: Negative.     All other systems reviewed and are negative.      Objective     Vitals:    11/28/23 0815   BP: 110/80   Pulse: 100   Resp: 18   Temp: 98.4 °F (36.9 °C)   SpO2: 98%   Weight: 85.6 kg (188 lb 12.8 oz)       Physical Exam  Vitals and nursing note reviewed.   Constitutional:       Appearance: She is well-developed.   HENT:      Head: Normocephalic and atraumatic.   Eyes:      General:         Right eye: No discharge.         Left eye: No discharge.      Pupils: Pupils are equal, round, and reactive to light.   Cardiovascular:      Rate and Rhythm: Normal rate and regular rhythm.      Heart sounds: Normal heart sounds.   Pulmonary:      Effort: Pulmonary effort is normal.      Breath sounds: Normal breath sounds.   Abdominal:      General: Bowel sounds are normal.      Palpations: Abdomen is soft. There is no  mass.      Tenderness: There is no abdominal tenderness.   Musculoskeletal:         General: Normal range of motion.      Right shoulder: No swelling.      Cervical back: Normal range of motion and neck supple.   Skin:     General: Skin is warm and dry.      Nails: There is no clubbing.   Neurological:      Mental Status: She is alert and oriented to person, place, and time.      Deep Tendon Reflexes: Reflexes are normal and symmetric.   Psychiatric:         Behavior: Behavior normal.         Thought Content: Thought content normal.         Judgment: Judgment normal.         Assessment & Plan     Problem List Items Addressed This Visit          Allergies and Adverse Reactions    Allergic rhinitis (Chronic)    Relevant Medications    levocetirizine (XYZAL) 5 MG tablet       Cardiac and Vasculature    Benign essential hypertension - Primary (Chronic)    Relevant Orders    CBC & Differential    TSH    Comprehensive Metabolic Panel    Hemoglobin A1c    Lipid Panel    Vitamin D,25-Hydroxy    Vitamin B12    Urinalysis With Culture If Indicated - Urine, Clean Catch       Endocrine and Metabolic    PCOS (polycystic ovarian syndrome)    Thyroid nodule    Morbidly obese       Musculoskeletal and Injuries    Acute bilateral knee pain    Relevant Medications    Diclofenac Sodium (VOLTAREN) 1 % gel gel     Other Visit Diagnoses       Arthralgia, unspecified joint        Relevant Orders    COLLIN    Rheumatoid Factor    C-reactive protein    Sedimentation Rate    Medication refill        Relevant Medications    levocetirizine (XYZAL) 5 MG tablet

## 2023-11-29 LAB — ANA SER QL: NEGATIVE

## 2023-12-26 DIAGNOSIS — K21.9 GASTROESOPHAGEAL REFLUX DISEASE WITHOUT ESOPHAGITIS: Primary | ICD-10-CM

## 2023-12-26 RX ORDER — LANSOPRAZOLE 30 MG/1
30 CAPSULE, DELAYED RELEASE ORAL 2 TIMES DAILY
Qty: 180 CAPSULE | Refills: 3 | Status: SHIPPED | OUTPATIENT
Start: 2023-12-26

## 2024-01-03 DIAGNOSIS — I10 BENIGN ESSENTIAL HYPERTENSION: ICD-10-CM

## 2024-01-04 DIAGNOSIS — I10 BENIGN ESSENTIAL HYPERTENSION: ICD-10-CM

## 2024-01-04 RX ORDER — NEBIVOLOL 10 MG/1
10 TABLET ORAL DAILY
Qty: 90 TABLET | Refills: 3 | OUTPATIENT
Start: 2024-01-04

## 2024-01-05 RX ORDER — NEBIVOLOL 10 MG/1
10 TABLET ORAL DAILY
Qty: 90 TABLET | Refills: 3 | Status: SHIPPED | OUTPATIENT
Start: 2024-01-05

## 2024-01-30 DIAGNOSIS — I10 BENIGN ESSENTIAL HYPERTENSION: ICD-10-CM

## 2024-01-31 RX ORDER — NEBIVOLOL 10 MG/1
10 TABLET ORAL DAILY
Qty: 90 TABLET | Refills: 3 | Status: SHIPPED | OUTPATIENT
Start: 2024-01-31

## 2024-03-01 ENCOUNTER — PRIOR AUTHORIZATION (OUTPATIENT)
Dept: FAMILY MEDICINE CLINIC | Facility: CLINIC | Age: 49
End: 2024-03-01
Payer: COMMERCIAL

## 2024-03-01 NOTE — TELEPHONE ENCOUNTER
*JUAACKLYN CUNNINGHAM   (Key: [id number])  PA Case ID #: [id number]    Diclofenac Sodium 1% gel      *JUAACKLYN CUNNINGHAM   (Key: [id number])  PA Case ID #: [id number]    Lansoprazole 30MG dr capsules

## 2024-05-21 ENCOUNTER — LAB (OUTPATIENT)
Dept: LAB | Facility: HOSPITAL | Age: 49
End: 2024-05-21
Payer: COMMERCIAL

## 2024-05-21 ENCOUNTER — OFFICE VISIT (OUTPATIENT)
Dept: FAMILY MEDICINE CLINIC | Facility: CLINIC | Age: 49
End: 2024-05-21
Payer: COMMERCIAL

## 2024-05-21 VITALS
SYSTOLIC BLOOD PRESSURE: 118 MMHG | HEART RATE: 89 BPM | RESPIRATION RATE: 20 BRPM | BODY MASS INDEX: 29.53 KG/M2 | HEIGHT: 61 IN | DIASTOLIC BLOOD PRESSURE: 88 MMHG | OXYGEN SATURATION: 98 % | WEIGHT: 156.4 LBS | TEMPERATURE: 97.3 F

## 2024-05-21 DIAGNOSIS — E78.5 HYPERLIPIDEMIA LDL GOAL <130: ICD-10-CM

## 2024-05-21 DIAGNOSIS — E28.2 PCOS (POLYCYSTIC OVARIAN SYNDROME): ICD-10-CM

## 2024-05-21 DIAGNOSIS — M25.50 ARTHRALGIA, UNSPECIFIED JOINT: ICD-10-CM

## 2024-05-21 DIAGNOSIS — I10 BENIGN ESSENTIAL HYPERTENSION: Primary | ICD-10-CM

## 2024-05-21 DIAGNOSIS — T75.3XXA MOTION SICKNESS, INITIAL ENCOUNTER: ICD-10-CM

## 2024-05-21 LAB
ALBUMIN SERPL-MCNC: 4.1 G/DL (ref 3.5–5.2)
ALBUMIN/GLOB SERPL: 1.6 G/DL
ALP SERPL-CCNC: 38 U/L (ref 39–117)
ALT SERPL W P-5'-P-CCNC: 13 U/L (ref 1–33)
ANION GAP SERPL CALCULATED.3IONS-SCNC: 11 MMOL/L (ref 5–15)
AST SERPL-CCNC: 13 U/L (ref 1–32)
BILIRUB SERPL-MCNC: 0.5 MG/DL (ref 0–1.2)
BUN SERPL-MCNC: 11 MG/DL (ref 6–20)
BUN/CREAT SERPL: 15.5 (ref 7–25)
CALCIUM SPEC-SCNC: 9.2 MG/DL (ref 8.6–10.5)
CHLORIDE SERPL-SCNC: 106 MMOL/L (ref 98–107)
CHOLEST SERPL-MCNC: 220 MG/DL (ref 0–200)
CO2 SERPL-SCNC: 25 MMOL/L (ref 22–29)
CREAT SERPL-MCNC: 0.71 MG/DL (ref 0.57–1)
CRP SERPL-MCNC: 1.12 MG/DL (ref 0–0.5)
EGFRCR SERPLBLD CKD-EPI 2021: 106.3 ML/MIN/1.73
ERYTHROCYTE [SEDIMENTATION RATE] IN BLOOD: 15 MM/HR (ref 0–20)
GLOBULIN UR ELPH-MCNC: 2.5 GM/DL
GLUCOSE SERPL-MCNC: 85 MG/DL (ref 65–99)
HBA1C MFR BLD: 4.6 % (ref 4.8–5.6)
HDLC SERPL-MCNC: 58 MG/DL (ref 40–60)
LDLC SERPL CALC-MCNC: 150 MG/DL (ref 0–100)
LDLC/HDLC SERPL: 2.56 {RATIO}
POTASSIUM SERPL-SCNC: 3.3 MMOL/L (ref 3.5–5.2)
PROT SERPL-MCNC: 6.6 G/DL (ref 6–8.5)
SODIUM SERPL-SCNC: 142 MMOL/L (ref 136–145)
TRIGL SERPL-MCNC: 69 MG/DL (ref 0–150)
VLDLC SERPL-MCNC: 12 MG/DL (ref 5–40)

## 2024-05-21 PROCEDURE — 83036 HEMOGLOBIN GLYCOSYLATED A1C: CPT

## 2024-05-21 PROCEDURE — 80053 COMPREHEN METABOLIC PANEL: CPT

## 2024-05-21 PROCEDURE — 36415 COLL VENOUS BLD VENIPUNCTURE: CPT

## 2024-05-21 PROCEDURE — 85652 RBC SED RATE AUTOMATED: CPT

## 2024-05-21 PROCEDURE — 86140 C-REACTIVE PROTEIN: CPT

## 2024-05-21 PROCEDURE — 99214 OFFICE O/P EST MOD 30 MIN: CPT | Performed by: FAMILY MEDICINE

## 2024-05-21 PROCEDURE — 80061 LIPID PANEL: CPT

## 2024-05-21 RX ORDER — ONDANSETRON 4 MG/1
4 TABLET, FILM COATED ORAL
COMMUNITY

## 2024-05-21 RX ORDER — VONOPRAZAN FUMARATE 26.72 MG/1
20 TABLET ORAL DAILY
COMMUNITY

## 2024-05-21 RX ORDER — SCOLOPAMINE TRANSDERMAL SYSTEM 1 MG/1
1 PATCH, EXTENDED RELEASE TRANSDERMAL
Qty: 10 EACH | Refills: 1 | Status: SHIPPED | OUTPATIENT
Start: 2024-05-21

## 2024-05-21 RX ORDER — KETOCONAZOLE 20 MG/ML
SHAMPOO TOPICAL
COMMUNITY
Start: 2024-02-26

## 2024-05-21 RX ORDER — SODIUM PICOSULFATE, MAGNESIUM OXIDE, AND ANHYDROUS CITRIC ACID 12; 3.5; 1 G/175ML; G/175ML; MG/175ML
LIQUID ORAL
COMMUNITY
Start: 2024-03-26 | End: 2024-05-21

## 2024-05-21 NOTE — PROGRESS NOTES
Subjective   Mariusz Yepez is a 48 y.o. female.     History of Present Illness she initially had labs checked on 11/28/2023 her total cholesterol was 234 HDL was 46 LDL was 171.  At that time she did not want to start on a cholesterol medication, she is taking omega-3 fatty acids so she wants to get her cholesterol rechecked.  Her blood pressure has been well-controlled.  She has lost electively about 40 pounds with Wegovy.    When we did labs it did show that she had a slightly elevated sed rate at 43, and her CRP was 3.36.  She had a negative COLLIN and negative rheumatoid factor.    She has plans to see a . She is seeing nutrition counselor through insurance for wegovy.  She is currently on 1.7 mg wegovy.        She is requesting blood work.      Her prior A1c 4.7.      The following portions of the patient's history were reviewed and updated as appropriate: allergies, current medications, past family history, past medical history, past social history, past surgical history, and problem list.    Review of Systems   Constitutional:  Negative for chills, fatigue, fever and unexpected weight change.   HENT:  Negative for congestion, ear pain, nosebleeds, rhinorrhea, sinus pressure, sneezing, sore throat and trouble swallowing.    Eyes:  Negative for itching and visual disturbance.   Respiratory:  Negative for cough, chest tightness, shortness of breath and wheezing.    Cardiovascular:  Negative for chest pain, palpitations and leg swelling.   Gastrointestinal:  Negative for abdominal pain, anal bleeding, blood in stool, constipation, diarrhea, nausea and vomiting.   Endocrine: Negative for cold intolerance, heat intolerance, polydipsia and polyuria.   Genitourinary:  Negative for difficulty urinating, frequency, hematuria and urgency.   Musculoskeletal:  Negative for back pain, gait problem and myalgias.   Skin:  Negative for rash and wound.   Neurological:  Negative for dizziness,  "weakness, numbness and headaches.   Hematological:  Negative for adenopathy. Does not bruise/bleed easily.   Psychiatric/Behavioral:  Negative for agitation, confusion, decreased concentration and suicidal ideas. The patient is not nervous/anxious.        Objective     Vitals:    05/21/24 0818   BP: 118/88   BP Location: Right arm   Patient Position: Sitting   Cuff Size: Adult   Pulse: 89   Resp: 20   Temp: 97.3 °F (36.3 °C)   TempSrc: Temporal   SpO2: 98%   Weight: 70.9 kg (156 lb 6.4 oz)   Height: 154.9 cm (61\")       Physical Exam  Vitals and nursing note reviewed.   Constitutional:       Appearance: Normal appearance.   HENT:      Head: Normocephalic and atraumatic.   Eyes:      General: No scleral icterus.     Conjunctiva/sclera: Conjunctivae normal.   Cardiovascular:      Rate and Rhythm: Normal rate and regular rhythm.      Heart sounds: Normal heart sounds. No murmur heard.  Pulmonary:      Effort: Pulmonary effort is normal. No respiratory distress.      Breath sounds: Normal breath sounds. No wheezing or rhonchi.   Abdominal:      General: Abdomen is flat. Bowel sounds are normal.      Tenderness: There is no abdominal tenderness.   Musculoskeletal:      Cervical back: Normal range of motion and neck supple. No rigidity or tenderness.   Lymphadenopathy:      Cervical: No cervical adenopathy.   Skin:     General: Skin is warm.      Findings: No erythema or rash.   Neurological:      General: No focal deficit present.      Mental Status: She is alert.   Psychiatric:         Mood and Affect: Mood normal.         Thought Content: Thought content normal.         Assessment & Plan     Problem List Items Addressed This Visit          Cardiac and Vasculature    Benign essential hypertension - Primary (Chronic)    Current Assessment & Plan     Hypertension is stable and controlled  Continue current treatment regimen.  Weight loss.  Blood pressure will be reassessed in 6 months.            Endocrine and Metabolic    " PCOS (polycystic ovarian syndrome)    Relevant Orders    Hemoglobin A1c       Musculoskeletal and Injuries    Arthralgia    Relevant Orders    C-reactive protein    Sedimentation Rate     Other Visit Diagnoses       Hyperlipidemia LDL goal <130        Relevant Orders    Comprehensive Metabolic Panel    Lipid Panel    Motion sickness, initial encounter        Relevant Medications    Scopolamine 1 MG/3DAYS patch

## 2024-05-22 NOTE — PROGRESS NOTES
.The labs that we did in the office look good her potassium is just slightly low I recommend she eat bananas and potassium rich foods like leafy greens    Specific markers of inflammation look improved when compared to previous labs.    Cholesterol looks improved when compared to previous labs I would continue to work on diet exercise and lifestyle changes try to get your bad cholesterol down.  Try to avoid fried fatty foods.

## 2024-07-29 ENCOUNTER — APPOINTMENT (OUTPATIENT)
Dept: CT IMAGING | Facility: HOSPITAL | Age: 49
End: 2024-07-29
Payer: COMMERCIAL

## 2024-07-29 ENCOUNTER — HOSPITAL ENCOUNTER (EMERGENCY)
Facility: HOSPITAL | Age: 49
Discharge: HOME | End: 2024-07-29
Attending: STUDENT IN AN ORGANIZED HEALTH CARE EDUCATION/TRAINING PROGRAM
Payer: COMMERCIAL

## 2024-07-29 VITALS
OXYGEN SATURATION: 100 % | BODY MASS INDEX: 26.43 KG/M2 | WEIGHT: 140 LBS | HEART RATE: 90 BPM | RESPIRATION RATE: 18 BRPM | SYSTOLIC BLOOD PRESSURE: 107 MMHG | TEMPERATURE: 98.1 F | HEIGHT: 61 IN | DIASTOLIC BLOOD PRESSURE: 82 MMHG

## 2024-07-29 DIAGNOSIS — E04.1 THYROID CYST: ICD-10-CM

## 2024-07-29 DIAGNOSIS — R22.1 NECK MASS: Primary | ICD-10-CM

## 2024-07-29 DIAGNOSIS — E87.6 HYPOKALEMIA: ICD-10-CM

## 2024-07-29 LAB
ALBUMIN SERPL-MCNC: 4.7 G/DL (ref 3.5–5.2)
ALBUMIN/GLOB SERPL: 1.3 G/DL
ALP SERPL-CCNC: 46 U/L (ref 39–117)
ALT SERPL W P-5'-P-CCNC: 9 U/L (ref 1–33)
ANION GAP SERPL CALCULATED.3IONS-SCNC: 12 MMOL/L (ref 5–15)
AST SERPL-CCNC: 21 U/L (ref 1–32)
BASOPHILS # BLD AUTO: 0.06 10*3/MM3 (ref 0–0.2)
BASOPHILS NFR BLD AUTO: 0.6 % (ref 0–1.5)
BILIRUB SERPL-MCNC: 0.7 MG/DL (ref 0–1.2)
BUN SERPL-MCNC: 7 MG/DL (ref 6–20)
BUN/CREAT SERPL: 10 (ref 7–25)
CALCIUM SPEC-SCNC: 10.2 MG/DL (ref 8.6–10.5)
CHLORIDE SERPL-SCNC: 100 MMOL/L (ref 98–107)
CO2 SERPL-SCNC: 29 MMOL/L (ref 22–29)
CREAT SERPL-MCNC: 0.7 MG/DL (ref 0.57–1)
DEPRECATED RDW RBC AUTO: 45.1 FL (ref 37–54)
EGFRCR SERPLBLD CKD-EPI 2021: 108.2 ML/MIN/1.73
EOSINOPHIL # BLD AUTO: 0.09 10*3/MM3 (ref 0–0.4)
EOSINOPHIL NFR BLD AUTO: 0.9 % (ref 0.3–6.2)
ERYTHROCYTE [DISTWIDTH] IN BLOOD BY AUTOMATED COUNT: 13 % (ref 12.3–15.4)
GLOBULIN UR ELPH-MCNC: 3.5 GM/DL
GLUCOSE SERPL-MCNC: 123 MG/DL (ref 65–99)
HCT VFR BLD AUTO: 39.9 % (ref 34–46.6)
HGB BLD-MCNC: 13.8 G/DL (ref 12–15.9)
IMM GRANULOCYTES # BLD AUTO: 0.02 10*3/MM3 (ref 0–0.05)
IMM GRANULOCYTES NFR BLD AUTO: 0.2 % (ref 0–0.5)
LYMPHOCYTES # BLD AUTO: 4.1 10*3/MM3 (ref 0.7–3.1)
LYMPHOCYTES NFR BLD AUTO: 42.3 % (ref 19.6–45.3)
MAGNESIUM SERPL-MCNC: 1.8 MG/DL (ref 1.6–2.6)
MCH RBC QN AUTO: 32.7 PG (ref 26.6–33)
MCHC RBC AUTO-ENTMCNC: 34.6 G/DL (ref 31.5–35.7)
MCV RBC AUTO: 94.5 FL (ref 79–97)
MONOCYTES # BLD AUTO: 0.6 10*3/MM3 (ref 0.1–0.9)
MONOCYTES NFR BLD AUTO: 6.2 % (ref 5–12)
NEUTROPHILS NFR BLD AUTO: 4.82 10*3/MM3 (ref 1.7–7)
NEUTROPHILS NFR BLD AUTO: 49.8 % (ref 42.7–76)
NRBC BLD AUTO-RTO: 0 /100 WBC (ref 0–0.2)
PLATELET # BLD AUTO: 323 10*3/MM3 (ref 140–450)
PMV BLD AUTO: 9.4 FL (ref 6–12)
POTASSIUM SERPL-SCNC: 2.8 MMOL/L (ref 3.5–5.2)
PROT SERPL-MCNC: 8.2 G/DL (ref 6–8.5)
RBC # BLD AUTO: 4.22 10*6/MM3 (ref 3.77–5.28)
S PYO AG THROAT QL: NEGATIVE
SODIUM SERPL-SCNC: 141 MMOL/L (ref 136–145)
WBC NRBC COR # BLD AUTO: 9.69 10*3/MM3 (ref 3.4–10.8)

## 2024-07-29 PROCEDURE — 87081 CULTURE SCREEN ONLY: CPT | Performed by: NURSE PRACTITIONER

## 2024-07-29 PROCEDURE — 70491 CT SOFT TISSUE NECK W/DYE: CPT

## 2024-07-29 PROCEDURE — 25510000001 IOPAMIDOL 61 % SOLUTION: Performed by: STUDENT IN AN ORGANIZED HEALTH CARE EDUCATION/TRAINING PROGRAM

## 2024-07-29 PROCEDURE — 83735 ASSAY OF MAGNESIUM: CPT | Performed by: NURSE PRACTITIONER

## 2024-07-29 PROCEDURE — 80053 COMPREHEN METABOLIC PANEL: CPT | Performed by: NURSE PRACTITIONER

## 2024-07-29 PROCEDURE — 96360 HYDRATION IV INFUSION INIT: CPT

## 2024-07-29 PROCEDURE — 87880 STREP A ASSAY W/OPTIC: CPT | Performed by: NURSE PRACTITIONER

## 2024-07-29 PROCEDURE — 99285 EMERGENCY DEPT VISIT HI MDM: CPT

## 2024-07-29 PROCEDURE — 85025 COMPLETE CBC W/AUTO DIFF WBC: CPT | Performed by: NURSE PRACTITIONER

## 2024-07-29 PROCEDURE — 25810000003 SODIUM CHLORIDE 0.9 % SOLUTION: Performed by: NURSE PRACTITIONER

## 2024-07-29 RX ORDER — POTASSIUM CHLORIDE 20 MEQ/1
40 TABLET, EXTENDED RELEASE ORAL EVERY 4 HOURS
Status: DISCONTINUED | OUTPATIENT
Start: 2024-07-29 | End: 2024-07-30 | Stop reason: HOSPADM

## 2024-07-29 RX ADMIN — POTASSIUM CHLORIDE 40 MEQ: 1500 TABLET, EXTENDED RELEASE ORAL at 22:51

## 2024-07-29 RX ADMIN — IOPAMIDOL 75 ML: 612 INJECTION, SOLUTION INTRAVENOUS at 20:23

## 2024-07-29 RX ADMIN — SODIUM CHLORIDE 500 ML: 9 INJECTION, SOLUTION INTRAVENOUS at 19:54

## 2024-07-29 NOTE — ED PROVIDER NOTES
EMERGENCY DEPARTMENT ENCOUNTER    Pt Name: Mariusz Yepez  MRN: [id number]  Pt :   1975  Room Number:  1616  Date of encounter:  2024  PCP: Yoko Maki MD  ED Provider: DAVIN Valdez      Historian: Patient      HPI:  Chief Complaint: Neck mass        Context: Mariusz Yepez is a 48 y.o. female who presents to the ED c/o left-sided neck mass since this afternoon.  Patient states was leaving work when she noted discomfort and swelling to the left side of the neck.  Reports no sore throat, difficulty swallowing, shortness of breath.  Reports no injury to the area, relates history of trigeminal neuralgia to the left side.  Was seen at urgent care and sent to ER for further evaluation      PAST MEDICAL HISTORY  Past Medical History:   Diagnosis Date    Asthma     Allergy induced    Esophageal reflux     Fibroids     History of mammography, screening 2017    Dr. Barraza    History of vitamin D deficiency     Hypertension     Pap smear for cervical cancer screening     Dr. Hanna     Polycystic ovaries     Screening breast examination     denies    Thyroid nodule          PAST SURGICAL HISTORY  Past Surgical History:   Procedure Laterality Date    CHOLECYSTECTOMY  2017    COLONOSCOPY  2017    ENDOSCOPY  2013    esophagitis     UMBILICAL HERNIA REPAIR           FAMILY HISTORY  Family History   Problem Relation Age of Onset    Hypertension Mother     Diabetes Mother     Stroke Paternal Grandfather     Hypertension Paternal Grandfather     Glaucoma Paternal Grandmother     Hypertension Paternal Grandmother     Pancreatic cancer Maternal Grandmother     Hypertension Maternal Grandmother     Glaucoma Maternal Grandfather     Diabetes Maternal Grandfather     Hypertension Maternal Grandfather     Colon cancer Other         Paternal cousins    Breast cancer Neg Hx     Ovarian cancer Neg Hx     Uterine cancer Neg Hx          SOCIAL HISTORY  Social  History     Socioeconomic History    Marital status: Single   Tobacco Use    Smoking status: Never     Passive exposure: Never    Smokeless tobacco: Never   Vaping Use    Vaping status: Never Used   Substance and Sexual Activity    Alcohol use: Yes     Comment: occasionally    Drug use: Never    Sexual activity: Not Currently     Partners: Male     Birth control/protection: Abstinence, Birth control pill         ALLERGIES  Lopressor [metoprolol tartrate], Norvasc [amlodipine besylate], Phenergan [promethazine hcl], Promethazine, Codeine, and Carbamazepine        REVIEW OF SYSTEMS  Review of Systems       All systems reviewed and negative except for those discussed in HPI.       PHYSICAL EXAM    I have reviewed the triage vital signs and nursing notes.    ED Triage Vitals [07/29/24 1912]   Temp Heart Rate Resp BP SpO2   98.1 °F (36.7 °C) 90 16 (!) 158/115 100 %      Temp src Heart Rate Source Patient Position BP Location FiO2 (%)   Oral Monitor Sitting Right arm --       Physical Exam  GENERAL:   Appears in no acute distress.   HENT: Nares patent.  Mild swelling left lateral lower neck.  No uveal deviation, no pulsating mass  EYES: No scleral icterus.  CV: Regular rhythm, regular rate.  RESPIRATORY: Normal effort.  No audible wheezes, rales or rhonchi.  ABDOMEN: Soft, nontender  MUSCULOSKELETAL: No deformities.   NEURO: Alert, moves all extremities, follows commands.  SKIN: Warm, dry, no rash visualized.      LAB RESULTS  Recent Results (from the past 24 hour(s))   CBC Auto Differential    Collection Time: 07/29/24  7:54 PM    Specimen: Blood   Result Value Ref Range    WBC 9.69 3.40 - 10.80 10*3/mm3    RBC 4.22 3.77 - 5.28 10*6/mm3    Hemoglobin 13.8 12.0 - 15.9 g/dL    Hematocrit 39.9 34.0 - 46.6 %    MCV 94.5 79.0 - 97.0 fL    MCH 32.7 26.6 - 33.0 pg    MCHC 34.6 31.5 - 35.7 g/dL    RDW 13.0 12.3 - 15.4 %    RDW-SD 45.1 37.0 - 54.0 fl    MPV 9.4 6.0 - 12.0 fL    Platelets 323 140 - 450 10*3/mm3    Neutrophil %  49.8 42.7 - 76.0 %    Lymphocyte % 42.3 19.6 - 45.3 %    Monocyte % 6.2 5.0 - 12.0 %    Eosinophil % 0.9 0.3 - 6.2 %    Basophil % 0.6 0.0 - 1.5 %    Immature Grans % 0.2 0.0 - 0.5 %    Neutrophils, Absolute 4.82 1.70 - 7.00 10*3/mm3    Lymphocytes, Absolute 4.10 (H) 0.70 - 3.10 10*3/mm3    Monocytes, Absolute 0.60 0.10 - 0.90 10*3/mm3    Eosinophils, Absolute 0.09 0.00 - 0.40 10*3/mm3    Basophils, Absolute 0.06 0.00 - 0.20 10*3/mm3    Immature Grans, Absolute 0.02 0.00 - 0.05 10*3/mm3    nRBC 0.0 0.0 - 0.2 /100 WBC   Comprehensive Metabolic Panel    Collection Time: 07/29/24  7:54 PM    Specimen: Blood   Result Value Ref Range    Glucose 123 (H) 65 - 99 mg/dL    BUN 7 6 - 20 mg/dL    Creatinine 0.70 0.57 - 1.00 mg/dL    Sodium 141 136 - 145 mmol/L    Potassium 2.8 (L) 3.5 - 5.2 mmol/L    Chloride 100 98 - 107 mmol/L    CO2 29.0 22.0 - 29.0 mmol/L    Calcium 10.2 8.6 - 10.5 mg/dL    Total Protein 8.2 6.0 - 8.5 g/dL    Albumin 4.7 3.5 - 5.2 g/dL    ALT (SGPT) 9 1 - 33 U/L    AST (SGOT) 21 1 - 32 U/L    Alkaline Phosphatase 46 39 - 117 U/L    Total Bilirubin 0.7 0.0 - 1.2 mg/dL    Globulin 3.5 gm/dL    A/G Ratio 1.3 g/dL    BUN/Creatinine Ratio 10.0 7.0 - 25.0    Anion Gap 12.0 5.0 - 15.0 mmol/L    eGFR 108.2 >60.0 mL/min/1.73   Magnesium    Collection Time: 07/29/24  7:54 PM    Specimen: Blood   Result Value Ref Range    Magnesium 1.8 1.6 - 2.6 mg/dL   Rapid Strep A Screen - Swab, Throat    Collection Time: 07/29/24  8:02 PM    Specimen: Throat; Swab   Result Value Ref Range    Strep A Ag Negative Negative       If labs were ordered, I independently reviewed the results and considered them in treating the patient.        RADIOLOGY  CT Soft Tissue Neck With Contrast    Result Date: 7/29/2024  CT SOFT TISSUE NECK W CONTRAST Date of Exam: 7/29/2024 8:08 PM EDT Indication: neck mass. Comparison: Thyroid ultrasound 10/31/2023 Technique: Axial CT images were obtained of the neck after the uneventful intravenous  administration of 85 cc Isovue-300.  Reconstructed coronal and sagittal images were also obtained. Automated exposure control and iterative construction methods were used. Findings: The visualized intracranial structures are unremarkable. The orbits are intact. The paranasal sinuses and mastoid air cells are clear. The tongue and tongue base are unremarkable, although evaluation is mildly limited given streak artifact from dental amalgam. The oropharynx, nasopharynx, hypopharynx and laryngeal structures are unremarkable. Mild deviation of the trachea to the right. No evidence of airway obstruction. The parotid glands and submandibular glands are unremarkable. Large cystic lesion within the left thyroid lobe measuring approximately 3.1 x 4.0 x 3.9 cm. Additional subcentimeter hypodense thyroid nodule within the right thyroid lobe. The visualized esophagus is unremarkable. The visualized mediastinal structures are unremarkable. The visualized lungs are clear. The arterial and venous structures are patent. The visualized soft tissues are unremarkable. No lymphadenopathy. No suspicious mass or fluid collection. The visualized osseous structures are intact.     Impression: Large cystic lesion noted within the left thyroid lobe measuring up to 4.0 cm causes mild mass effect on the proximal trachea. A similar cystic lesion was noted on prior thyroid ultrasound. This most likely represents a benign cystic lesion and please consider follow-up with dedicated outpatient thyroid ultrasound. No suspicious mass or fluid collection is noted within the neck Electronically Signed: Patricio Pollock DO  7/29/2024 9:09 PM EDT  Workstation ID: [id number]     I ordered and independently reviewed the above noted radiographic studies.      I viewed images of neck which showed large mass/cyst adjacent to the thyroid per my independent interpretation.    See radiologist's dictation for official interpretation.         PROCEDURES    Procedures    No orders to display       MEDICATIONS GIVEN IN ER    Medications   Potassium Replacement - Follow Nurse / BPA Driven Protocol (has no administration in time range)   potassium chloride (KLOR-CON M20) CR tablet 40 mEq (40 mEq Oral Given 7/29/24 2251)   sodium chloride 0.9 % bolus 500 mL (0 mL Intravenous Stopped 7/29/24 2200)   iopamidol (ISOVUE-300) 61 % injection 100 mL (75 mL Intravenous Given 7/29/24 2023)         MEDICAL DECISION MAKING, PROGRESS, and CONSULTS    All labs, if obtained, have been independently reviewed by me.  All radiology studies, if obtained, have been reviewed by me and the radiologist dictating the report.  All EKG's, if obtained, have been independently viewed and interpreted by me/my attending physician.      Discussion below represents my analysis of pertinent findings related to patient's condition, differential diagnosis, treatment plan and final disposition.  Patient is 48-year-old female who presents for evaluation of sudden onset of swelling to the left side of the neck since this afternoon.  On physical exam she was nontoxic-appearing, vital signs were stable including SpO2 100% on room air.  On physical exam slight swelling to the left lower neck noted, slightly tender on palpation without erythema or crepitus.  CT soft tissue neck with contrast was obtained showed large cystic lesion noted to the left thyroid lobe measuring 4 cm; similar cystic lesion on the prior thyroid ultrasound. Patient had an ultrasound 1 year ago showing a dominant polycystic solid left inferior thyroid nodule measuring 3.7 cm that technically required no further follow-up. I spoke with Dr. Ganga Mcdaniels, ENT, advised outpatient follow-up with the office.  Furthermore patient will need ultrasound-guided needle aspiration, deferred to ENT.     Lab work was obtained notable for hypokalemia with potassium 2.8, replaced prior to discharge.  We discussed strict return  precautions for worsening of the symptoms including difficulty breathing or swallowing.  Patient voiced understanding, was agreeable with plan of care                       Differential diagnosis:    Thyroid with, thyroid cyst, aneurysm, lymphadenopathy, malignancy      Additional sources:    - Discussed/ obtained information from independent historians:      - External (non-ED) record review: US thyroid 10/12/2023    - Chronic or social conditions impacting care:      - Shared decision making: Patient      Orders placed during this visit:  Orders Placed This Encounter   Procedures    Rapid Strep A Screen - Swab, Throat    Beta Strep Culture, Throat - Swab, Throat    CT Soft Tissue Neck With Contrast    CBC Auto Differential    Comprehensive Metabolic Panel    Magnesium    Potassium    Ambulatory Referral to ENT (Otolaryngology)         Additional orders considered but not ordered:      ED Course:    Consultants:      ED Course as of 07/30/24 0018   Mon Jul 29, 2024   2200 Spol [IR]      ED Course User Index  [IR] Shameka Aguilera APRN              Shared Decision Making:  After my consideration of clinical presentation and any laboratory/radiology studies obtained, I discussed the findings with the patient/patient representative who is in agreement with the treatment plan and the final disposition.   Risks and benefits of discharge and/or observation/admission were discussed.       AS OF 00:18 EDT VITALS:    BP - 107/82  HR - 90  TEMP - 98.1 °F (36.7 °C) (Oral)  O2 SATS - 100%                  DIAGNOSIS  Final diagnoses:   Neck mass   Thyroid cyst   Hypokalemia         DISPOSITION  DISCHARGE    Patient discharged in stable condition.    Reviewed implications of results, diagnosis, meds, responsibility to follow up, warning signs and symptoms of possible worsening, potential complications and reasons to return to ER.    Patient/Family voiced understanding of above instructions.    Discussed plan for discharge, as  there is no emergent indication for admission.  Pt/family is agreeable and understands need for follow up and possible repeat testing.  Pt/family is aware that discharge does not mean that nothing is wrong but that it indicates no emergency is currently present that requires admission and they must continue care with follow-up as given below or with a physician of their choice.     FOLLOW-UP  Ganga Mcdaniels MD  [address] [address]  [phone]               Medication List        Changed      lamoTRIgine 25 MG tablet  Commonly known as: LaMICtal  Take 1 tablet by mouth 2 (Two) Times a Day.  What changed:   when to take this  additional instructions                 Please note that portions of this document were completed with voice recognition software.     DAVIN Valdez   07/30/24   00:18 EDT        Shameka Aguilera APRN  07/30/24 0018

## 2024-07-31 ENCOUNTER — PATIENT ROUNDING (BHMG ONLY) (OUTPATIENT)
Dept: URGENT CARE | Facility: CLINIC | Age: 49
End: 2024-07-31
Payer: COMMERCIAL

## 2024-07-31 LAB — BACTERIA SPEC AEROBE CULT: NORMAL

## 2024-07-31 NOTE — ED NOTES
Thank you for letting us care for you in your recent visit to our urgent care center. We would love to hear about your experience with us. Was this the first time you have visited our location?    We’re always looking for ways to make our patients’ experiences even better. Do you have any recommendations on ways we may improve?     I appreciate you taking the time to respond. Please be on the lookout for a survey about your recent visit from [email or web address] via text or email. We would greatly appreciate if you could fill that out and turn it back in. We want your voice to be heard and we value your feedback.   Thank you for choosing [facility] for your healthcare needs.

## 2024-08-06 ENCOUNTER — LAB (OUTPATIENT)
Dept: LAB | Facility: HOSPITAL | Age: 49
End: 2024-08-06
Payer: COMMERCIAL

## 2024-08-06 ENCOUNTER — OFFICE VISIT (OUTPATIENT)
Dept: FAMILY MEDICINE CLINIC | Facility: CLINIC | Age: 49
End: 2024-08-06
Payer: COMMERCIAL

## 2024-08-06 VITALS
BODY MASS INDEX: 27.1 KG/M2 | HEART RATE: 70 BPM | OXYGEN SATURATION: 99 % | TEMPERATURE: 98.4 F | RESPIRATION RATE: 20 BRPM | WEIGHT: 143.4 LBS | DIASTOLIC BLOOD PRESSURE: 84 MMHG | SYSTOLIC BLOOD PRESSURE: 120 MMHG

## 2024-08-06 DIAGNOSIS — E04.1 THYROID NODULE: ICD-10-CM

## 2024-08-06 DIAGNOSIS — F41.9 ANXIETY: ICD-10-CM

## 2024-08-06 DIAGNOSIS — E87.6 HYPOKALEMIA: ICD-10-CM

## 2024-08-06 DIAGNOSIS — E04.1 THYROID NODULE: Primary | ICD-10-CM

## 2024-08-06 DIAGNOSIS — I10 BENIGN ESSENTIAL HYPERTENSION: Chronic | ICD-10-CM

## 2024-08-06 PROCEDURE — 86376 MICROSOMAL ANTIBODY EACH: CPT

## 2024-08-06 PROCEDURE — 80048 BASIC METABOLIC PNL TOTAL CA: CPT

## 2024-08-06 PROCEDURE — 84439 ASSAY OF FREE THYROXINE: CPT

## 2024-08-06 PROCEDURE — 99214 OFFICE O/P EST MOD 30 MIN: CPT | Performed by: FAMILY MEDICINE

## 2024-08-06 PROCEDURE — 36415 COLL VENOUS BLD VENIPUNCTURE: CPT

## 2024-08-06 PROCEDURE — 84443 ASSAY THYROID STIM HORMONE: CPT

## 2024-08-06 PROCEDURE — 84480 ASSAY TRIIODOTHYRONINE (T3): CPT

## 2024-08-06 RX ORDER — ESCITALOPRAM OXALATE 10 MG/1
10 TABLET ORAL DAILY
Qty: 30 TABLET | Refills: 3 | Status: SHIPPED | OUTPATIENT
Start: 2024-08-06

## 2024-08-06 NOTE — PROGRESS NOTES
Subjective   Mariusz Yepez is a 48 y.o. female.     History of Present Illness she is here for follow-up after she was seen in the ER on 7/29/2024 at [facility].  She had noted swelling in her neck that came on after she got off work that day.  While she was in the ER she got a CT scan of her neck that showed a 3.7 cm left thyroid cystic lesion.  Dr. Ganga Mcdaniels was consulted while patient was in the ER and recommended a ultrasound-guided FNA. Mild deviation of trachea to right.      She was also noted in the ER to have a 2.8 potassium.  That was replaced while she was in the ER.    She was taking wegovy but has dc that. 2.5 weeks.     This was prev seen on us, no changes.    She has been seeing carlos rodriguez previously she needs referral and she has also contacted to see dr davian tucker at  she can see her nov 6.  She is having anxiety and pain from lesion.  She is requesting referral to endo at [facility].       She is req something for anxiety.  Her neck feels sore and tender.      She is still taking lyrica for trigeminal neuralgia.      The following portions of the patient's history were reviewed and updated as appropriate: allergies, current medications, past family history, past medical history, past social history, past surgical history, and problem list.    Review of Systems    Objective     Vitals:    08/06/24 1301   BP: 120/84   BP Location: Left arm   Patient Position: Sitting   Cuff Size: Adult   Pulse: 70   Resp: 20   Temp: 98.4 °F (36.9 °C)   TempSrc: Infrared   SpO2: 99%   Weight: 65 kg (143 lb 6.4 oz)       Physical Exam  Vitals and nursing note reviewed.   Constitutional:       General: She is not in acute distress.     Appearance: She is well-developed. She is not diaphoretic.   HENT:      Head: Normocephalic and atraumatic.      Right Ear: External ear normal.      Left Ear: External ear normal.   Eyes:      General: Lids are normal. No scleral icterus.        Right  eye: No discharge.         Left eye: No discharge.      Conjunctiva/sclera: Conjunctivae normal.      Pupils: Pupils are equal, round, and reactive to light.   Neck:      Thyroid: No thyroid mass or thyromegaly.      Vascular: No carotid bruit or JVD.      Trachea: No tracheal deviation.      Comments: She has a 4 cm left lower neck soft mass.  Tender to slight touch.      Cardiovascular:      Rate and Rhythm: Normal rate and regular rhythm.      Heart sounds: Normal heart sounds. No murmur heard.     No friction rub. No gallop.   Pulmonary:      Effort: Pulmonary effort is normal. No respiratory distress.      Breath sounds: Normal breath sounds. No decreased breath sounds, wheezing, rhonchi or rales.   Chest:      Chest wall: No tenderness.   Abdominal:      General: Bowel sounds are normal. There is no distension.      Palpations: Abdomen is soft. There is no mass.      Tenderness: There is no abdominal tenderness. There is no guarding or rebound.      Hernia: No hernia is present.   Musculoskeletal:         General: Normal range of motion.      Cervical back: Tenderness present.   Lymphadenopathy:      Cervical: No cervical adenopathy.      Upper Body:      Right upper body: No supraclavicular adenopathy.      Left upper body: No supraclavicular adenopathy.   Skin:     Findings: No bruising, erythema or rash.      Nails: There is no clubbing.   Neurological:      Mental Status: She is alert and oriented to person, place, and time.      Cranial Nerves: No cranial nerve deficit.      Deep Tendon Reflexes: Reflexes are normal and symmetric. Reflexes normal.   Psychiatric:         Speech: Speech normal.         Behavior: Behavior normal.         Thought Content: Thought content normal.         Judgment: Judgment normal.         Assessment & Plan     Problem List Items Addressed This Visit          Cardiac and Vasculature    Benign essential hypertension (Chronic)       Endocrine and Metabolic    Thyroid nodule -  Primary    Relevant Orders    Ambulatory Referral to Endocrinology    TSH    Thyroid Peroxidase Antibody    T3    T4, free     Other Visit Diagnoses       Hypokalemia        Relevant Orders    Basic Metabolic Panel    Anxiety        Relevant Medications    escitalopram (Lexapro) 10 MG tablet          We could consider cymbalta for both pain and anxiety.   Try lexapro first.    Check k  Consider adding oral potassium supplement.    Check thyroid levels.    Reviewed ED and previous neck/thyroid ultrasound readings.    Placed referral to endo at [facility].    She has appt to see a doctor at  in a few months, would like to see if she can be seen sooner.

## 2024-08-07 DIAGNOSIS — E87.6 HYPOKALEMIA: Primary | ICD-10-CM

## 2024-08-07 LAB
ANION GAP SERPL CALCULATED.3IONS-SCNC: 14 MMOL/L (ref 5–15)
BUN SERPL-MCNC: 9 MG/DL (ref 6–20)
BUN/CREAT SERPL: 12.3 (ref 7–25)
CALCIUM SPEC-SCNC: 10.4 MG/DL (ref 8.6–10.5)
CHLORIDE SERPL-SCNC: 98 MMOL/L (ref 98–107)
CO2 SERPL-SCNC: 28 MMOL/L (ref 22–29)
CREAT SERPL-MCNC: 0.73 MG/DL (ref 0.57–1)
EGFRCR SERPLBLD CKD-EPI 2021: 102.9 ML/MIN/1.73
GLUCOSE SERPL-MCNC: 74 MG/DL (ref 65–99)
POTASSIUM SERPL-SCNC: 2.8 MMOL/L (ref 3.5–5.2)
SODIUM SERPL-SCNC: 140 MMOL/L (ref 136–145)
T3 SERPL-MCNC: 142 NG/DL (ref 80–200)
T4 FREE SERPL-MCNC: 1.49 NG/DL (ref 0.92–1.68)
TSH SERPL DL<=0.05 MIU/L-ACNC: 2.48 UIU/ML (ref 0.27–4.2)

## 2024-08-07 RX ORDER — POTASSIUM CHLORIDE 750 MG/1
10 TABLET, EXTENDED RELEASE ORAL 2 TIMES DAILY
Qty: 60 TABLET | Refills: 3 | Status: SHIPPED | OUTPATIENT
Start: 2024-08-07

## 2024-08-07 NOTE — PROGRESS NOTES
Notify pt I will send oral potassium bid to start with her HCTZ.  We will recheck in 1 week.  We may need to dc the hctz if the potassium does not correct.

## 2024-08-08 LAB — THYROPEROXIDASE AB SERPL-ACNC: <9 IU/ML (ref 0–34)

## 2024-08-13 ENCOUNTER — TRANSCRIBE ORDERS (OUTPATIENT)
Dept: ADMINISTRATIVE | Facility: HOSPITAL | Age: 49
End: 2024-08-13
Payer: COMMERCIAL

## 2024-08-13 DIAGNOSIS — Z12.31 SCREENING MAMMOGRAM FOR BREAST CANCER: Primary | ICD-10-CM

## 2024-08-22 ENCOUNTER — OFFICE VISIT (OUTPATIENT)
Dept: OBSTETRICS AND GYNECOLOGY | Facility: CLINIC | Age: 49
End: 2024-08-22
Payer: COMMERCIAL

## 2024-08-22 VITALS
DIASTOLIC BLOOD PRESSURE: 90 MMHG | BODY MASS INDEX: 27.53 KG/M2 | HEIGHT: 61 IN | WEIGHT: 145.8 LBS | SYSTOLIC BLOOD PRESSURE: 122 MMHG

## 2024-08-22 DIAGNOSIS — N76.0 ACUTE VAGINITIS: Primary | ICD-10-CM

## 2024-08-22 PROCEDURE — 99213 OFFICE O/P EST LOW 20 MIN: CPT | Performed by: ADVANCED PRACTICE MIDWIFE

## 2024-08-22 RX ORDER — FLUCONAZOLE 150 MG/1
TABLET ORAL
Qty: 2 TABLET | Refills: 1 | Status: SHIPPED | OUTPATIENT
Start: 2024-08-22

## 2024-08-22 RX ORDER — CLINDAMYCIN HYDROCHLORIDE 300 MG/1
300 CAPSULE ORAL 3 TIMES DAILY
Qty: 21 CAPSULE | Refills: 0 | Status: SHIPPED | OUTPATIENT
Start: 2024-08-22 | End: 2024-08-29

## 2024-08-22 NOTE — PROGRESS NOTES
Chief Complaint   Patient presents with    Follow-up     Vulvar conditions         Subjective   HPI  Nambridget Yepez is a 48 y.o. female, , who presents for evaluation of vulvar pain/discomfort that is initial.      She states she has experienced this problem for 1 week.  Since she first discovered the issue it has remained unchanged. She describes the severity as mild, reports the area is sore. Patient notes aggravating factors include bowel movements, wiping with toilet tissue, and washing herself in shower. No alleviating factors.      Periods are absent due to OCP. Partner Status: single. Pt reports she hasn't had sex in 15 years.  Desires STD Screening: yes. Her STD history is significant for  trichomonas, approx. 12 years ago.     Additional OB/GYN History   Last Pap : 2022 neg. HPV neg  Last Completed Pap Smear            PAP SMEAR (Every 3 Years) Next due on 2022  LIQUID-BASED PAP SMEAR, [facility] ([facility])    2019  Done - normal with negative HPV testing    2016  Reason not specified    2012  HM Pap smear component of [facility] PAP SMEAR                    Tobacco Usage?: No       Current Outpatient Medications:     albuterol (PROVENTIL HFA;VENTOLIN HFA) 108 (90 Base) MCG/ACT inhaler, Inhale 2 puffs Every 4 (Four) Hours As Needed for Wheezing., Disp: 1 inhaler, Rfl: 2    azelastine (ASTELIN) 0.1 % nasal spray, INSTILL 2 SPRAYS IN EACH NOSTRIL AS DIRECTED BY PROVIDER TWICE DAILY, Disp: 30 mL, Rfl: 11    Cholecalciferol (VITAMIN D3) 2000 UNITS tablet, Take  by mouth., Disp: , Rfl:     Diclofenac Sodium (VOLTAREN) 1 % gel gel, Apply  topically to the appropriate area as directed 2 (Two) Times a Day., Disp: 350 g, Rfl: 2    fluticasone (FLONASE) 50 MCG/ACT nasal spray, 2 sprays into the nostril(s) as directed by provider Daily., Disp: 48 g, Rfl: 11    fluticasone (FLOVENT HFA) 110 MCG/ACT inhaler, INHALE 2 PUFFS BY MOUTH TWICE DAILY AS  DIRECTED. RINSE MOUTH AFTER USE, Disp: , Rfl:     hydroCHLOROthiazide (HYDRODIURIL) 12.5 MG tablet, TAKE 1 TABLET BY MOUTH DAILY, Disp: 90 tablet, Rfl: 3    ketoconazole (NIZORAL) 2 % shampoo, , Disp: , Rfl:     lamoTRIgine (LaMICtal) 25 MG tablet, Take 1 tablet by mouth 2 (Two) Times a Day. (Patient taking differently: Take 1 tablet by mouth Daily. Patient takes 1 tablet daily), Disp: 60 tablet, Rfl: 3    levalbuterol (XOPENEX HFA) 45 MCG/ACT inhaler, INHALE 2 PUFFS BY MOUTH EVERY 4 TO 6 HOURS AS NEEDED FOR COUGH OR SHORTNESS OF BREATH OR WHEEZING, Disp: , Rfl:     levocetirizine (XYZAL) 5 MG tablet, Take 1 tablet by mouth Every Evening., Disp: 90 tablet, Rfl: 3    Lo Loestrin Fe 1 MG-10 MCG / 10 MCG tablet, Take 1 tablet by mouth Daily., Disp: 84 tablet, Rfl: 3    metFORMIN ER (GLUCOPHAGE-XR) 500 MG 24 hr tablet, TAKE 2 TABLETS BY MOUTH TWICE DAILY, Disp: 180 tablet, Rfl: 3    montelukast (SINGULAIR) 10 MG tablet, TAKE 1 TABLET BY MOUTH EVERY NIGHT AT BEDTIME, Disp: 90 tablet, Rfl: 3    nebivolol (BYSTOLIC) 10 MG tablet, TAKE 1 TABLET BY MOUTH DAILY, Disp: 90 tablet, Rfl: 3    Omega-3 Fatty Acids (FISH OIL) 1200 MG capsule capsule, Take 1 capsule by mouth Daily., Disp: , Rfl:     ondansetron (ZOFRAN) 4 MG tablet, Take 1 tablet by mouth., Disp: , Rfl:     potassium chloride (KLOR-CON M10) 10 MEQ CR tablet, Take 1 tablet by mouth 2 (Two) Times a Day., Disp: 60 tablet, Rfl: 3    Scopolamine 1 MG/3DAYS patch, Place 1 patch on the skin as directed by provider Every 72 (Seventy-Two) Hours., Disp: 10 each, Rfl: 1    Voquezna 20 MG tablet, Take 1 tablet by mouth Daily., Disp: , Rfl:     clindamycin (Cleocin) 300 MG capsule, Take 1 capsule by mouth 3 (Three) Times a Day for 7 days., Disp: 21 capsule, Rfl: 0    escitalopram (Lexapro) 10 MG tablet, Take 1 tablet by mouth Daily. (Patient not taking: Reported on 8/22/2024), Disp: 30 tablet, Rfl: 3    fluconazole (Diflucan) 150 MG tablet, Take one now and repeat in 3 days.,  "Disp: 2 tablet, Rfl: 1     Past Medical History:   Diagnosis Date    Asthma     Allergy induced    Esophageal reflux     Fibroid ?    Fibroids     History of mammography, screening 09/06/2017    Dr. Barraza    History of vitamin D deficiency     Hypertension     Pap smear for cervical cancer screening 2016    Dr. Hanna     Polycystic ovaries     Polycystic ovary syndrome ?    Screening breast examination     denies    Thyroid nodule     Varicella ?        Past Surgical History:   Procedure Laterality Date    CHOLECYSTECTOMY  11/30/2017    COLONOSCOPY  04/17/2017    ENDOSCOPY  12/30/2013    esophagitis     LAPAROSCOPIC CHOLECYSTECTOMY      UMBILICAL HERNIA REPAIR      WISDOM TOOTH EXTRACTION  ?       The additional following portions of the patient's history were reviewed and updated as appropriate: allergies, current medications, past family history, past medical history, past social history, past surgical history, and problem list.    Review of Systems   Constitutional: Negative.    HENT: Negative.     Eyes: Negative.    Respiratory: Negative.     Cardiovascular: Negative.    Gastrointestinal: Negative.    Endocrine: Negative.    Genitourinary:  Positive for vaginal pain.   Musculoskeletal: Negative.    Skin: Negative.    Allergic/Immunologic: Negative.    Neurological: Negative.    Hematological: Negative.    Psychiatric/Behavioral: Negative.         I have reviewed and agree with the HPI, ROS, and historical information as entered above. DAVIN Velazquez      Objective   /90   Ht 154.9 cm (61\")   Wt 66.1 kg (145 lb 12.8 oz)   LMP  (LMP Unknown)   BMI 27.55 kg/m²     Physical Exam  Vitals and nursing note reviewed. Exam conducted with a chaperone present.   Constitutional:       Appearance: Normal appearance.   Pulmonary:      Effort: Pulmonary effort is normal.   Genitourinary:     General: Normal vulva.      Exam position: Lithotomy position.      Labia:         Right: No rash, tenderness or lesion.    "      Left: No rash, tenderness or lesion.       Vagina: Normal. Vaginal discharge, erythema and tenderness present. No lesions.      Cervix: No lesion, erythema or cervical bleeding.      Comments: No BME done as pt has a hard time tolerating exam.  White discharge, not clumpy. No external lesions.  Musculoskeletal:         General: Normal range of motion.   Neurological:      Mental Status: She is alert and oriented to person, place, and time.       Assessment & Plan         Problem List Items Addressed This Visit    None  Visit Diagnoses       Acute vaginitis    -  Primary    Relevant Medications    clindamycin (Cleocin) 300 MG capsule    fluconazole (Diflucan) 150 MG tablet    Other Relevant Orders    NuSwab VG+, Candida 6sp    Genital Mycoplasmas NISREEN, Swab - Swab, Endocervix, Vagina              Plan     Nuswab obtained for STI/BV/yeast/Mycoplasma testing.   Will treat for BV with clindamycin, pt has a birthday trip to [facility] soon so wants to avoid Flagyl. Pt is aware we may need to change antibiotic.   Keep next scheduled appt for annual with Dr. Barraza.       Roberta Vargas, DAVIN  08/22/2024

## 2024-08-25 LAB
A VAGINAE DNA VAG QL NAA+PROBE: NORMAL SCORE
BVAB2 DNA VAG QL NAA+PROBE: NORMAL SCORE
C ALBICANS DNA VAG QL NAA+PROBE: NEGATIVE
C GLABRATA DNA VAG QL NAA+PROBE: NEGATIVE
C KRUSEI DNA VAG QL NAA+PROBE: NEGATIVE
C LUSITANIAE DNA VAG QL NAA+PROBE: NEGATIVE
C TRACH DNA SPEC QL NAA+PROBE: NEGATIVE
CANDIDA DNA VAG QL NAA+PROBE: NEGATIVE
MEGA1 DNA VAG QL NAA+PROBE: NORMAL SCORE
N GONORRHOEA DNA VAG QL NAA+PROBE: NEGATIVE
T VAGINALIS DNA VAG QL NAA+PROBE: NEGATIVE

## 2024-08-26 LAB
M GENITALIUM DNA SPEC QL NAA+PROBE: NEGATIVE
M HOMINIS DNA SPEC QL NAA+PROBE: NEGATIVE
UREAPLASMA DNA SPEC QL NAA+PROBE: NEGATIVE

## 2024-08-28 ENCOUNTER — LAB (OUTPATIENT)
Dept: LAB | Facility: HOSPITAL | Age: 49
End: 2024-08-28
Payer: COMMERCIAL

## 2024-08-28 DIAGNOSIS — N76.0 ACUTE VAGINITIS: ICD-10-CM

## 2024-08-28 DIAGNOSIS — E87.6 HYPOKALEMIA: ICD-10-CM

## 2024-08-28 LAB
ANION GAP SERPL CALCULATED.3IONS-SCNC: 14 MMOL/L (ref 5–15)
BUN SERPL-MCNC: 16 MG/DL (ref 6–20)
BUN/CREAT SERPL: 20.3 (ref 7–25)
CALCIUM SPEC-SCNC: 10.3 MG/DL (ref 8.6–10.5)
CHLORIDE SERPL-SCNC: 100 MMOL/L (ref 98–107)
CO2 SERPL-SCNC: 22 MMOL/L (ref 22–29)
CREAT SERPL-MCNC: 0.79 MG/DL (ref 0.57–1)
EGFRCR SERPLBLD CKD-EPI 2021: 93.6 ML/MIN/1.73
GLUCOSE SERPL-MCNC: 84 MG/DL (ref 65–99)
POTASSIUM SERPL-SCNC: 4.4 MMOL/L (ref 3.5–5.2)
SODIUM SERPL-SCNC: 136 MMOL/L (ref 136–145)

## 2024-08-28 PROCEDURE — 80048 BASIC METABOLIC PNL TOTAL CA: CPT

## 2024-08-28 RX ORDER — CLINDAMYCIN HCL 300 MG
300 CAPSULE ORAL 3 TIMES DAILY
Qty: 4 CAPSULE | Refills: 0 | Status: SHIPPED | OUTPATIENT
Start: 2024-08-28

## 2024-09-03 ENCOUNTER — TELEPHONE (OUTPATIENT)
Dept: FAMILY MEDICINE CLINIC | Facility: CLINIC | Age: 49
End: 2024-09-03
Payer: COMMERCIAL

## 2024-09-03 NOTE — TELEPHONE ENCOUNTER
Notify the patient that recheck showed a normal potassium.    Called patient with the above message and she voiced understtanding.

## 2024-09-06 NOTE — PROGRESS NOTES
Subjective   Mariusz Yepez is a 42 y.o. female    Chief Complaint   Patient presents with   • Annual Exam     History of Present Illness     Here for PE, pap per GYN    Pt had GB removed on 11/30/17 per Dr. Lafleur.  During her pre-op, she was told her EKG was abnormal.  In review, it does show LVH and abn ST depression in the anterior leads.  She denies any sx's.  States that she climbs 3 flights of stairs multiple times per day at work w/o problems.  I sent her for an echo after her last visit and it showed mild tricuspid valve regurgitation, but was otherwise normal.  She denies any complaints     Constipation/abdominal pain is much better since her GB was removed.  Followed by [facility].       Was found to be anemic; now taking ferrous sulfate and doing B12 injections every other week.  B12 and iron looks much better now.        Allergy sx's are better. I referred her to an allergist in August 2016. She added Qvar to her Singulair, Albuterol, flonase and astelin.       HTN - chronic; was previously on Bystolic as she had problems with several other BP meds in the past.  I sent in Coreg at her last visit.  She is now tolerating well w/o SE.  BP looks great!      Reflux - chronic and stable on Prilosec daily; sx's are well controlled      GYN- Dr. Hanna; she is repeated her TV US in Sept - repeat done due to LLQ pain; had a new fibroid, but no other changes; doing Q6 months now.    mamm - 9/6/2017  last colon - 4/2017 (2 polyps)   last Pap summer 2017  DXA none  last eye exam; goes every 6 months   TdaP 8/07   Flu shot - fall 2017 @ work  EGD 12/30/13 - esophagitis    Diet - trying to make healthier choices    Exercise - minimal, but trying to increase activity    Social History     Social History   • Marital status: Single     Spouse name: N/A   • Number of children: N/A   • Years of education: N/A     Occupational History   • Not on file.     Social History Main Topics   • Smoking status: Never Smoker   •  Smokeless tobacco: Never Used   • Alcohol use Yes      Comment: on ocassion    • Drug use: No   • Sexual activity: Not on file     Other Topics Concern   • Not on file     Social History Narrative   • No narrative on file         The following portions of the patient's history were reviewed and updated as appropriate: allergies, current medications, past family history, past medical history, past social history, past surgical history and problem list.    Current Outpatient Prescriptions:   •  albuterol (PROVENTIL HFA;VENTOLIN HFA) 108 (90 Base) MCG/ACT inhaler, Inhale 2 puffs Every 4 (Four) Hours As Needed for Wheezing., Disp: 1 inhaler, Rfl: 2  •  azelastine (ASTELIN) 0.1 % nasal spray, 2 sprays into each nostril 2 (two) times a day. Use in each nostril as directed, Disp: 1 each, Rfl: 12  •  BYSTOLIC 10 MG tablet, take 1 tablet by mouth once daily, Disp: 30 tablet, Rfl: 5  •  carvedilol (COREG) 6.25 MG tablet, Take 1 tablet by mouth 2 (Two) Times a Day With Meals., Disp: 60 tablet, Rfl: 5  •  Cholecalciferol (VITAMIN D3) 2000 UNITS tablet, Take  by mouth., Disp: , Rfl:   •  colestipol (COLESTID) 1 g tablet, take 2 tablets by mouth twice a day, Disp: 120 tablet, Rfl: 0  •  cyanocobalamin 1000 MCG/ML injection, Inject 1 mL every 2 weeks, Disp: 30 mL, Rfl: 5  •  dicyclomine (BENTYL) 10 MG capsule, take 1 capsule by mouth three times a day if needed, Disp: , Rfl: 0  •  ferrous sulfate 325 (65 FE) MG tablet, take 1 tablet by mouth twice a day with meals, Disp: 60 tablet, Rfl: 4  •  fexofenadine (ALLEGRA ALLERGY) 180 MG tablet, Take 1 tablet by mouth daily., Disp: 30 tablet, Rfl: 5  •  fluticasone (FLONASE) 50 MCG/ACT nasal spray, inhale 2 sprays into each nostril once daily, Disp: 16 g, Rfl: 11  •  hydrochlorothiazide (HYDRODIURIL) 12.5 MG tablet, take 1 tablet by mouth once daily, Disp: 30 tablet, Rfl: 5  •  LO LOESTRIN FE 1 MG-10 MCG / 10 MCG tablet, 1 po qd, Disp: , Rfl: 1  •  Melatonin 3 MG tablet, Take  by mouth  "Every Night., Disp: , Rfl:   •  metFORMIN ER (GLUCOPHAGE-XR) 500 MG 24 hr tablet, Take 1 tablet by mouth 2 (Two) Times a Day., Disp: 60 tablet, Rfl: 5  •  montelukast (SINGULAIR) 10 MG tablet, take 1 tablet by mouth at bedtime, Disp: 30 tablet, Rfl: 5  •  Omega-3 Fatty Acids (FISH OIL) 1200 MG capsule capsule, Take 1,200 mg by mouth Daily., Disp: , Rfl:   •  omeprazole (priLOSEC) 40 MG capsule, take 1 capsule by mouth twice a day, Disp: 60 capsule, Rfl: 5  •  Syringe/Needle, Disp, (BD LUER-MERON SYRINGE) 25G X 5/8\" 1 ML misc, Use for B12 injections once a week x 1 month, then once a month, Disp: 6 each, Rfl: 3  •  triamcinolone (KENALOG) 0.1 % cream, Apply  topically 2 (Two) Times a Day., Disp: 45 g, Rfl: 2     Review of Systems   Constitutional: Negative for appetite change, chills, fatigue, fever and unexpected weight change.   HENT: Negative for congestion, ear pain, nosebleeds, rhinorrhea and tinnitus.    Eyes: Negative for pain.   Respiratory: Negative for cough, chest tightness and shortness of breath.    Cardiovascular: Negative for chest pain, palpitations and leg swelling.   Gastrointestinal: Negative for abdominal distention, abdominal pain, blood in stool, constipation, diarrhea, nausea and vomiting.   Endocrine: Negative for cold intolerance, heat intolerance, polydipsia, polyphagia and polyuria.   Genitourinary: Negative for dysuria, flank pain, frequency, hematuria and urgency.   Musculoskeletal: Negative for arthralgias, back pain, gait problem, joint swelling, myalgias and neck pain.   Skin: Negative for color change, pallor, rash and wound.   Allergic/Immunologic: Negative for environmental allergies and food allergies.   Neurological: Negative for dizziness, syncope, weakness, light-headedness, numbness and headaches.   Hematological: Negative for adenopathy. Does not bruise/bleed easily.   Psychiatric/Behavioral: Negative for behavioral problems and suicidal ideas. The patient is not " "nervous/anxious.        Objective   Physical Exam   Constitutional: She is oriented to person, place, and time. She appears well-developed and well-nourished.   HENT:   Head: Normocephalic and atraumatic.   Right Ear: External ear normal.   Left Ear: External ear normal.   Nose: Nose normal.   Mouth/Throat: Oropharynx is clear and moist.   Eyes: Pupils are equal, round, and reactive to light. Conjunctivae and EOM are normal.   Neck: Normal range of motion. Neck supple.   Cardiovascular: Normal rate, regular rhythm, normal heart sounds and intact distal pulses.    Pulses:       Carotid pulses are 2+ on the right side, and 2+ on the left side.  Pulmonary/Chest: Effort normal and breath sounds normal.   Abdominal: Soft. Normal appearance, normal aorta and bowel sounds are normal.   Musculoskeletal: Normal range of motion.   Lymphadenopathy:        Head (right side): No tonsillar adenopathy present.        Head (left side): No tonsillar adenopathy present.        Right cervical: No superficial cervical and no posterior cervical adenopathy present.       Left cervical: No superficial cervical and no posterior cervical adenopathy present.   Neurological: She is alert and oriented to person, place, and time. She has normal strength and normal reflexes. She displays a negative Romberg sign.   Skin: Skin is warm, dry and intact.   Psychiatric: She has a normal mood and affect. Her behavior is normal. Judgment and thought content normal.   Vitals reviewed.    Vitals:    07/26/18 1319   BP: 110/84   Resp: 16   Temp: 98.1 °F (36.7 °C)   TempSrc: Temporal Artery    Weight: 95.7 kg (211 lb)   Height: 169 cm (66.54\")         Assessment/Plan   Mariusz was seen today for annual exam.    Diagnoses and all orders for this visit:    Routine general medical examination at a health care facility    Benign essential hypertension    Gastroesophageal reflux disease, esophagitis presence not specified    Vitamin D deficiency      Labs " reviewed  Will cut Metformin down to 500mg daily  No other med changes  No refills needed  Counseling - diet and exercise  Return in about 6 months (around 1/26/2019).

## 2024-09-26 ENCOUNTER — HOSPITAL ENCOUNTER (OUTPATIENT)
Dept: MAMMOGRAPHY | Facility: HOSPITAL | Age: 49
Discharge: HOME | End: 2024-09-26
Admitting: OBSTETRICS & GYNECOLOGY
Payer: COMMERCIAL

## 2024-09-26 DIAGNOSIS — Z12.31 SCREENING MAMMOGRAM FOR BREAST CANCER: ICD-10-CM

## 2024-09-26 PROCEDURE — 77067 SCR MAMMO BI INCL CAD: CPT

## 2024-09-26 PROCEDURE — 77063 BREAST TOMOSYNTHESIS BI: CPT

## 2024-10-03 DIAGNOSIS — J30.9 ALLERGIC RHINITIS: ICD-10-CM

## 2024-10-03 DIAGNOSIS — I10 BENIGN ESSENTIAL HYPERTENSION: ICD-10-CM

## 2024-10-03 DIAGNOSIS — Z76.0 MEDICATION REFILL: ICD-10-CM

## 2024-10-03 RX ORDER — HYDROCHLOROTHIAZIDE 12.5 MG/1
12.5 TABLET ORAL DAILY
Qty: 90 TABLET | Refills: 0 | Status: SHIPPED | OUTPATIENT
Start: 2024-10-03

## 2024-10-03 RX ORDER — MONTELUKAST SODIUM 10 MG/1
10 TABLET ORAL
Qty: 90 TABLET | Refills: 0 | Status: SHIPPED | OUTPATIENT
Start: 2024-10-03

## 2024-10-21 RX ORDER — NORETHINDRONE ACETATE AND ETHINYL ESTRADIOL, ETHINYL ESTRADIOL AND FERROUS FUMARATE 1MG-10(24)
1 KIT ORAL DAILY
Qty: 84 TABLET | Refills: 0 | Status: SHIPPED | OUTPATIENT
Start: 2024-10-21

## 2024-11-04 DIAGNOSIS — D21.9 FIBROIDS: Primary | ICD-10-CM

## 2024-11-05 ENCOUNTER — OFFICE VISIT (OUTPATIENT)
Dept: OBSTETRICS AND GYNECOLOGY | Facility: CLINIC | Age: 49
End: 2024-11-05
Payer: COMMERCIAL

## 2024-11-05 VITALS — WEIGHT: 147 LBS | SYSTOLIC BLOOD PRESSURE: 126 MMHG | DIASTOLIC BLOOD PRESSURE: 84 MMHG | BODY MASS INDEX: 27.78 KG/M2

## 2024-11-05 DIAGNOSIS — Z12.39 ENCOUNTER FOR BREAST CANCER SCREENING USING NON-MAMMOGRAM MODALITY: ICD-10-CM

## 2024-11-05 DIAGNOSIS — Z30.41 ENCOUNTER FOR SURVEILLANCE OF CONTRACEPTIVE PILLS: ICD-10-CM

## 2024-11-05 DIAGNOSIS — Z01.419 WOMEN'S ANNUAL ROUTINE GYNECOLOGICAL EXAMINATION: Primary | ICD-10-CM

## 2024-11-05 DIAGNOSIS — D25.1 INTRAMURAL LEIOMYOMA OF UTERUS: ICD-10-CM

## 2024-11-05 NOTE — PROGRESS NOTES
Gynecologic Annual Exam Note          GYN Annual Exam     Gynecologic Exam        Subjective     HPI  Mariusz Yepez is a 49 y.o. female, , who presents for annual well woman exam as a established patient. Since her last visit the patient was diagnosed with a neck nodule or goiter and potassium deficiency in 2024 .  No LMP recorded (lmp unknown). (Menstrual status: Oral contraceptives).  Her periods are absent secondary to birth control. She denies dysmenorrhea. Marital Status: single. She is not currently sexually active. STD testing recommendations have been explained to the patient and she declines STD testing.    The patient would like to discuss the following complaints today: Patient reports she was diagnosed with a neck nodule or goiter and potassium deficiency in 2024. Patient is seeing a endocrinology surgeon tomorrow at  for a biopsy of the nodule. Patient reports no other complaints or concerns.    Additional OB/GYN History   contraceptive methods: OCP (estrogen/progesterone)  Desires to: continue contraception  History of migraines: no    Last Pap : 22. Result: negative. HPV: negative.   Last Completed Pap Smear            PAP SMEAR (Every 3 Years) Next due on 2022  LIQUID-BASED PAP SMEAR, [facility] ([facility])    2019  Done - normal with negative HPV testing    2016  Reason not specified    2012   Pap smear component of  PAP SMEAR                  History of abnormal Pap smear: no  Family history of uterine, colon, breast, or ovarian cancer: paternal cousin - colon CA  Performs monthly Self-Breast Exam: no  Last mammogram: 24. Done at . There is a copy in the chart.    Last Completed Mammogram            Ordered - MAMMOGRAM (Yearly) Ordered on 2024  Mammo Screening Digital Tomosynthesis Bilateral With CAD    2023  Mammo Diagnostic Digital Tomosynthesis Left With CAD    2023   Mammo Screening Digital Tomosynthesis Bilateral With CAD    05/23/2022  Mammo Screening Digital Tomosynthesis Bilateral With CAD    05/14/2021  Mammo Screening Digital Tomosynthesis Bilateral With CAD    Only the first 5 history entries have been loaded, but more history exists.                    Colonoscopy: has had a colonoscopy 4 years ago  Exercises Regularly: yes  Feelings of Anxiety or Depression: no  Tobacco Usage?: No       Current Outpatient Medications:     albuterol (PROVENTIL HFA;VENTOLIN HFA) 108 (90 Base) MCG/ACT inhaler, Inhale 2 puffs Every 4 (Four) Hours As Needed for Wheezing., Disp: 1 inhaler, Rfl: 2    azelastine (ASTELIN) 0.1 % nasal spray, INSTILL 2 SPRAYS IN EACH NOSTRIL AS DIRECTED BY PROVIDER TWICE DAILY, Disp: 30 mL, Rfl: 11    Cholecalciferol (VITAMIN D3) 2000 UNITS tablet, Take  by mouth., Disp: , Rfl:     Diclofenac Sodium (VOLTAREN) 1 % gel gel, Apply  topically to the appropriate area as directed 2 (Two) Times a Day., Disp: 350 g, Rfl: 2    fluticasone (FLONASE) 50 MCG/ACT nasal spray, 2 sprays into the nostril(s) as directed by provider Daily., Disp: 48 g, Rfl: 11    fluticasone (FLOVENT HFA) 110 MCG/ACT inhaler, INHALE 2 PUFFS BY MOUTH TWICE DAILY AS DIRECTED. RINSE MOUTH AFTER USE, Disp: , Rfl:     hydroCHLOROthiazide 12.5 MG tablet, TAKE 1 TABLET BY MOUTH DAILY, Disp: 90 tablet, Rfl: 0    ketoconazole (NIZORAL) 2 % shampoo, , Disp: , Rfl:     lamoTRIgine (LaMICtal) 25 MG tablet, Take 1 tablet by mouth 2 (Two) Times a Day. (Patient taking differently: Take 1 tablet by mouth Daily. Patient takes 1 tablet daily), Disp: 60 tablet, Rfl: 3    levalbuterol (XOPENEX HFA) 45 MCG/ACT inhaler, INHALE 2 PUFFS BY MOUTH EVERY 4 TO 6 HOURS AS NEEDED FOR COUGH OR SHORTNESS OF BREATH OR WHEEZING, Disp: , Rfl:     levocetirizine (XYZAL) 5 MG tablet, Take 1 tablet by mouth Every Evening., Disp: 90 tablet, Rfl: 3    Lo Loestrin Fe 1 MG-10 MCG / 10 MCG tablet, TAKE 1 TABLET BY MOUTH DAILY, Disp: 84  tablet, Rfl: 0    metFORMIN ER (GLUCOPHAGE-XR) 500 MG 24 hr tablet, TAKE 2 TABLETS BY MOUTH TWICE DAILY, Disp: 180 tablet, Rfl: 3    montelukast (SINGULAIR) 10 MG tablet, TAKE 1 TABLET BY MOUTH EVERY NIGHT AT BEDTIME, Disp: 90 tablet, Rfl: 0    nebivolol (BYSTOLIC) 10 MG tablet, TAKE 1 TABLET BY MOUTH DAILY, Disp: 90 tablet, Rfl: 3    Omega-3 Fatty Acids (FISH OIL) 1200 MG capsule capsule, Take 1 capsule by mouth Daily., Disp: , Rfl:     ondansetron (ZOFRAN) 4 MG tablet, Take 1 tablet by mouth., Disp: , Rfl:     potassium chloride (KLOR-CON M10) 10 MEQ CR tablet, Take 1 tablet by mouth 2 (Two) Times a Day., Disp: 60 tablet, Rfl: 3    Scopolamine 1 MG/3DAYS patch, Place 1 patch on the skin as directed by provider Every 72 (Seventy-Two) Hours., Disp: 10 each, Rfl: 1    Voquezna 20 MG tablet, Take 1 tablet by mouth Daily., Disp: , Rfl:      Patient is requesting refills of birth control for the year.    OB History          0    Para   0    Term   0       0    AB   0    Living   0         SAB   0    IAB   0    Ectopic   0    Molar   0    Multiple   0    Live Births   0                Past Medical History:   Diagnosis Date    Asthma     Allergy induced    Esophageal reflux     Fibroid ?    Fibroids     Goiter 2024    History of mammography, screening 2017    Dr. Barraza    History of vitamin D deficiency     Hypertension     Pap smear for cervical cancer screening     Dr. Hanna     Polycystic ovaries     Polycystic ovary syndrome ?    Potassium deficiency 2024    Screening breast examination     denies    Thyroid nodule     Varicella ?        Past Surgical History:   Procedure Laterality Date    CHOLECYSTECTOMY  2017    COLONOSCOPY  2017    ENDOSCOPY  2013    esophagitis     LAPAROSCOPIC CHOLECYSTECTOMY      UMBILICAL HERNIA REPAIR      WISDOM TOOTH EXTRACTION  ?       Health Maintenance   Topic Date Due    ANNUAL PHYSICAL  2023    BMI FOLLOWUP  2023    LIPID PANEL   05/21/2025    COLORECTAL CANCER SCREENING  06/30/2025    MAMMOGRAM  09/26/2025    PAP SMEAR  09/29/2025    Annual Gynecologic Pelvic and Breast Exam  11/06/2025    TDAP/TD VACCINES (3 - Td or Tdap) 06/12/2027    HEPATITIS C SCREENING  Completed    COVID-19 Vaccine  Completed    Pneumococcal Vaccine 0-64  Completed    INFLUENZA VACCINE  Completed       The additional following portions of the patient's history were reviewed and updated as appropriate: allergies, current medications, past family history, past medical history, past social history, past surgical history, and problem list.    Review of Systems   HENT:          Swelling on side of neck - nodule on neck   All other systems reviewed and are negative.        I have reviewed and agree with the HPI, ROS, and historical information as entered above. Kadie Barraza MD          Objective   /84   Wt 66.7 kg (147 lb)   LMP  (LMP Unknown) Comment: OCP  BMI 27.78 kg/m²     Physical Exam  Vitals and nursing note reviewed. Exam conducted with a chaperone present.   Constitutional:       Appearance: She is well-developed.   HENT:      Head: Normocephalic and atraumatic.   Neck:      Thyroid: No thyroid mass or thyromegaly.   Cardiovascular:      Rate and Rhythm: Normal rate and regular rhythm.      Heart sounds: No murmur heard.  Pulmonary:      Effort: Pulmonary effort is normal. No retractions.      Breath sounds: Normal breath sounds. No wheezing, rhonchi or rales.   Chest:      Chest wall: No mass or tenderness.   Breasts:     Right: Normal. No mass, nipple discharge, skin change or tenderness.      Left: Normal. No mass, nipple discharge, skin change or tenderness.   Abdominal:      General: Bowel sounds are normal.      Palpations: Abdomen is soft. Abdomen is not rigid. There is no mass.      Tenderness: There is no abdominal tenderness. There is no guarding.      Hernia: No hernia is present. There is no hernia in the left inguinal area.    Genitourinary:     Labia:         Right: No rash, tenderness or lesion.         Left: No rash, tenderness or lesion.       Vagina: Normal. No vaginal discharge or lesions.      Cervix: No cervical motion tenderness, discharge, lesion or cervical bleeding.      Uterus: Normal. Not enlarged, not fixed and not tender.       Adnexa:         Right: No mass or tenderness.          Left: No mass or tenderness.        Rectum: No external hemorrhoid.      Comments: Thyroid nodule on left  Musculoskeletal:      Cervical back: Normal range of motion. No muscular tenderness.   Neurological:      Mental Status: She is alert and oriented to person, place, and time.   Psychiatric:         Behavior: Behavior normal.            Assessment and Plan    Problem List Items Addressed This Visit    None  Visit Diagnoses       Women's annual routine gynecological examination    -  Primary    Encounter for breast cancer screening using non-mammogram modality        Relevant Orders    Mammo Screening Digital Tomosynthesis Bilateral With CAD    Intramural leiomyoma of uterus        Relevant Orders    US Non-ob Transvaginal    Encounter for surveillance of contraceptive pills                GYN annual well woman exam.   Pap guidelines reviewed.  Recommended use of Vitamin D replacement and getting adequate calcium in her diet. (1500mg)  Reviewed monthly self breast exams.  Instructed to call with lumps, pain, or breast discharge.    Continue yearly mammography  Reviewed HPV guidelines.  Reviewed exercise as a preventative health measures.    Stable leiomyomata - repeat in one year.  Return in about 1 year (around 11/5/2025) for US with Next Visit.     Kadie Barraza MD  11/05/2024

## 2024-11-11 NOTE — PROGRESS NOTES
--Continue home statin, plavix   [facility]  [address] [address]  Dept: [phone]  Dept Fax: [phone]  Loc: [phone]  Loc Fax: [phone]    Maudeelenbridget Yepez  1975    Follow Up Office Visit Note    History of Present Illness:  Patient is a 46-year-old female in today to follow-up for hypertension.  Patient was taking carvedilol 6.25 mg twice daily.  Patient would like to change back to nebivolol because this is now generic.  Patient was taking 10 mg of nebivolol daily and reports blood pressure was well controlled at home around what it was in the office today.  Patient reports overall doing well and feeling well denies any symptoms today.      The following portions of the patient's history were reviewed and updated as appropriate: allergies, current medications, past family history, past medical history, past social history, past surgical history, and problem list.    Medications:    Current Outpatient Medications:   •  albuterol (PROVENTIL HFA;VENTOLIN HFA) 108 (90 Base) MCG/ACT inhaler, Inhale 2 puffs Every 4 (Four) Hours As Needed for Wheezing., Disp: 1 inhaler, Rfl: 2  •  azelastine (ASTELIN) 0.1 % nasal spray, INSTILL 2 SPRAYS IN EACH NOSTRIL AS DIRECTED BY PROVIDER TWICE DAILY, Disp: 30 mL, Rfl: 11  •  Cholecalciferol (VITAMIN D3) 2000 UNITS tablet, Take  by mouth., Disp: , Rfl:   •  cyanocobalamin 1000 MCG/ML injection, Inject 1 mL into the appropriate muscle daily x 1 week, then once a week x 1 month, then monthly, Disp: 30 mL, Rfl: 5  •  FeroSul 325 (65 Fe) MG tablet, TAKE 1 TABLET BY MOUTH TWICE DAILY WITH FOOD, Disp: 180 tablet, Rfl: 1  •  fluticasone (FLONASE) 50 MCG/ACT nasal spray, INSTILL 2 SPRAYS IN EACH NOSTRIL AS DIRECTED BY PROVIDER DAILY, Disp: 48 g, Rfl: 2  •  hydroCHLOROthiazide (HYDRODIURIL) 12.5 MG tablet, TAKE 1 TABLET BY MOUTH EVERY DAY, Disp: 90 tablet, Rfl: 1  •  lamoTRIgine (LaMICtal) 25 MG tablet, Take 1 tablet  "by mouth 2 (Two) Times a Day., Disp: 60 tablet, Rfl: 3  •  levocetirizine (XYZAL) 5 MG tablet, TAKE 1 TABLET BY MOUTH EVERY EVENING, Disp: 90 tablet, Rfl: 1  •  Lo Loestrin Fe 1 MG-10 MCG / 10 MCG tablet, TAKE 1 TABLET BY MOUTH EVERY DAY, Disp: 84 tablet, Rfl: 3  •  Melatonin 3 MG tablet, Take  by mouth Every Night., Disp: , Rfl:   •  metFORMIN ER (GLUCOPHAGE-XR) 500 MG 24 hr tablet, TAKE 1 TABLET BY MOUTH TWICE DAILY, Disp: 180 tablet, Rfl: 1  •  montelukast (SINGULAIR) 10 MG tablet, TAKE 1 TABLET BY MOUTH EVERY NIGHT AT BEDTIME, Disp: 90 tablet, Rfl: 1  •  Omega-3 Fatty Acids (FISH OIL) 1200 MG capsule capsule, Take 1,200 mg by mouth Daily., Disp: , Rfl:   •  omeprazole (priLOSEC) 40 MG capsule, TAKE 1 CAPSULE BY MOUTH TWICE DAILY, Disp: 180 capsule, Rfl: 1  •  QVAR REDIHALER 80 MCG/ACT inhaler, INHALE 1 PUFF BY MOUTH TWICE DAILY, Disp: 10.6 g, Rfl: 5  •  Syringe/Needle, Disp, (B-D SYRINGE/NEEDLE 1CC/25GX5/8) 25G X 5/8\" 1 ML misc, Use as directed with B12 injections, Disp: 50 each, Rfl: 5  •  nebivolol (BYSTOLIC) 10 MG tablet, Take 1 tablet by mouth Daily., Disp: 90 tablet, Rfl: 3    Subjective  Allergies   Allergen Reactions   • Biaxin [Clarithromycin]      dizziness   • Lopressor [Metoprolol Tartrate]      Fatigue     • Norvasc [Amlodipine Besylate]      Constipation    • Phenergan [Promethazine Hcl] Shortness Of Breath   • Codeine Nausea And Vomiting   • Carbamazepine Rash        Past Medical History:   Diagnosis Date   • Asthma     Allergy induced   • Dysphagia    • Esophageal reflux    • Fibroids    • History of mammography, screening 09/06/2017    Dr. Barraza   • History of vitamin D deficiency    • Hypertension    • Pap smear for cervical cancer screening 2016    Dr. Hanna    • Polycystic ovaries    • Screening breast examination     denies   • Thyroid nodule        Past Surgical History:   Procedure Laterality Date   • CHOLECYSTECTOMY  11/30/2017   • COLONOSCOPY  04/17/2017   • ENDOSCOPY  12/30/2013    " "esophagitis    • UMBILICAL HERNIA REPAIR         Family History   Problem Relation Age of Onset   • Hypertension Mother    • Pancreatic cancer Maternal Grandmother    • Hypertension Maternal Grandmother    • Glaucoma Maternal Grandfather    • Diabetes Maternal Grandfather    • Hypertension Maternal Grandfather    • Stroke Paternal Grandfather    • Hypertension Paternal Grandfather    • Glaucoma Paternal Grandmother    • Hypertension Paternal Grandmother    • Colon cancer Other         Social History     Socioeconomic History   • Marital status: Single   Tobacco Use   • Smoking status: Never Smoker   • Smokeless tobacco: Never Used   Vaping Use   • Vaping Use: Never used   Substance and Sexual Activity   • Alcohol use: Yes     Comment: on ocassion --current some day   • Drug use: No   • Sexual activity: Not Currently       Review of Systems   Constitutional: Negative for activity change, chills, fatigue, fever and unexpected weight change.   HENT: Negative for congestion, ear pain, postnasal drip, sinus pressure and sore throat.    Eyes: Negative for pain, discharge and redness.   Respiratory: Negative for cough, shortness of breath and wheezing.    Cardiovascular: Negative for chest pain, palpitations and leg swelling.   Gastrointestinal: Negative for diarrhea, nausea and vomiting.   Endocrine: Negative for cold intolerance and heat intolerance.   Genitourinary: Negative for decreased urine volume and dysuria.   Musculoskeletal: Negative for arthralgias and myalgias.   Skin: Negative for rash and wound.   Neurological: Negative for dizziness, light-headedness and headaches.   Hematological: Does not bruise/bleed easily.   Psychiatric/Behavioral: Negative for confusion, dysphoric mood and sleep disturbance. The patient is not nervous/anxious.          Objective  Vitals:    01/11/22 1358   BP: 118/78   Pulse: 86   Resp: 18   Temp: 98.6 °F (37 °C)   SpO2: 100%   Weight: 98.4 kg (217 lb)   Height: 154.9 cm (61\") "     Body mass index is 41 kg/m².     Physical Exam  Physical Exam  Vitals and nursing note reviewed.   Constitutional:       General: She is not in acute distress.     Appearance: She is not ill-appearing.   HENT:      Head: Normocephalic.      Right Ear: Tympanic membrane, ear canal and external ear normal. There is no impacted cerumen.      Left Ear: Tympanic membrane, ear canal and external ear normal. There is no impacted cerumen.      Nose: No congestion or rhinorrhea.      Mouth/Throat:      Mouth: Mucous membranes are moist.      Pharynx: Oropharynx is clear. No oropharyngeal exudate or posterior oropharyngeal erythema.   Eyes:      General:         Right eye: No discharge.         Left eye: No discharge.      Extraocular Movements: Extraocular movements intact.      Conjunctiva/sclera: Conjunctivae normal.      Pupils: Pupils are equal, round, and reactive to light.   Cardiovascular:      Rate and Rhythm: Normal rate and regular rhythm.      Heart sounds: Normal heart sounds. No murmur heard.  No friction rub. No gallop.    Pulmonary:      Effort: Pulmonary effort is normal. No respiratory distress.      Breath sounds: Normal breath sounds. No wheezing.   Abdominal:      General: Bowel sounds are normal. There is no distension.      Palpations: Abdomen is soft. There is no mass.      Tenderness: There is no abdominal tenderness.   Musculoskeletal:         General: No swelling. Normal range of motion.      Cervical back: Normal range of motion. No tenderness.      Right lower leg: No edema.      Left lower leg: No edema.   Lymphadenopathy:      Cervical: No cervical adenopathy.   Skin:     Findings: No bruising, erythema or rash.   Neurological:      Mental Status: She is oriented to person, place, and time.      Gait: Gait normal.   Psychiatric:         Mood and Affect: Mood normal.         Behavior: Behavior normal.         Thought Content: Thought content normal.         Judgment: Judgment normal.          Diagnostic Data  Procedures    Assessment  Diagnoses and all orders for this visit:    1. Primary hypertension (Primary)    Other orders  -     nebivolol (BYSTOLIC) 10 MG tablet; Take 1 tablet by mouth Daily.  Dispense: 90 tablet; Refill: 3        Plan    1. Primary hypertension (Primary)- discontinue Coreg and changed to nebivolol 10 mg daily. Advised patient to take blood pressure readings at home 2-3 times daily. Advised if blood pressure higher than 160/100 or lower than 100/60 to call the office immediately. If symptomatic, go to the ER. Patient verbalized understanding of all instructions given and complied.         Return in about 2 weeks (around 1/25/2022) for HTN.and for physical.    Juanjose Goode PA-C  01/11/2022

## 2024-11-26 ENCOUNTER — LAB (OUTPATIENT)
Dept: LAB | Facility: HOSPITAL | Age: 49
End: 2024-11-26
Payer: COMMERCIAL

## 2024-11-26 ENCOUNTER — OFFICE VISIT (OUTPATIENT)
Dept: FAMILY MEDICINE CLINIC | Facility: CLINIC | Age: 49
End: 2024-11-26
Payer: COMMERCIAL

## 2024-11-26 VITALS
HEART RATE: 75 BPM | TEMPERATURE: 98.2 F | HEIGHT: 61 IN | WEIGHT: 167.8 LBS | OXYGEN SATURATION: 99 % | SYSTOLIC BLOOD PRESSURE: 121 MMHG | BODY MASS INDEX: 31.68 KG/M2 | DIASTOLIC BLOOD PRESSURE: 78 MMHG

## 2024-11-26 DIAGNOSIS — Z00.00 ANNUAL PHYSICAL EXAM: Primary | ICD-10-CM

## 2024-11-26 DIAGNOSIS — E53.8 VITAMIN B 12 DEFICIENCY: ICD-10-CM

## 2024-11-26 DIAGNOSIS — E87.6 HYPOKALEMIA: ICD-10-CM

## 2024-11-26 DIAGNOSIS — Z23 NEED FOR HPV VACCINATION: ICD-10-CM

## 2024-11-26 DIAGNOSIS — E66.01 MORBIDLY OBESE: ICD-10-CM

## 2024-11-26 DIAGNOSIS — E55.9 VITAMIN D DEFICIENCY: ICD-10-CM

## 2024-11-26 DIAGNOSIS — Z23 NEED FOR DIPHTHERIA-TETANUS-PERTUSSIS (TDAP) VACCINE: ICD-10-CM

## 2024-11-26 DIAGNOSIS — E28.2 PCOS (POLYCYSTIC OVARIAN SYNDROME): ICD-10-CM

## 2024-11-26 DIAGNOSIS — M25.561 ACUTE BILATERAL KNEE PAIN: ICD-10-CM

## 2024-11-26 DIAGNOSIS — Z00.00 ANNUAL PHYSICAL EXAM: ICD-10-CM

## 2024-11-26 DIAGNOSIS — M25.562 ACUTE BILATERAL KNEE PAIN: ICD-10-CM

## 2024-11-26 DIAGNOSIS — I10 BENIGN ESSENTIAL HYPERTENSION: Chronic | ICD-10-CM

## 2024-11-26 LAB
ANION GAP SERPL CALCULATED.3IONS-SCNC: 11.3 MMOL/L (ref 5–15)
BUN SERPL-MCNC: 15 MG/DL (ref 6–20)
BUN/CREAT SERPL: 19.2 (ref 7–25)
CALCIUM SPEC-SCNC: 9.6 MG/DL (ref 8.6–10.5)
CHLORIDE SERPL-SCNC: 103 MMOL/L (ref 98–107)
CHOLEST SERPL-MCNC: 244 MG/DL (ref 0–200)
CO2 SERPL-SCNC: 25.7 MMOL/L (ref 22–29)
CREAT SERPL-MCNC: 0.78 MG/DL (ref 0.57–1)
EGFRCR SERPLBLD CKD-EPI 2021: 94.4 ML/MIN/1.73
GLUCOSE SERPL-MCNC: 79 MG/DL (ref 65–99)
HDLC SERPL-MCNC: 76 MG/DL (ref 40–60)
LDLC SERPL CALC-MCNC: 153 MG/DL (ref 0–100)
LDLC/HDLC SERPL: 1.98 {RATIO}
POTASSIUM SERPL-SCNC: 3.7 MMOL/L (ref 3.5–5.2)
SODIUM SERPL-SCNC: 140 MMOL/L (ref 136–145)
TRIGL SERPL-MCNC: 88 MG/DL (ref 0–150)
VLDLC SERPL-MCNC: 15 MG/DL (ref 5–40)

## 2024-11-26 PROCEDURE — 80061 LIPID PANEL: CPT

## 2024-11-26 PROCEDURE — 99396 PREV VISIT EST AGE 40-64: CPT | Performed by: FAMILY MEDICINE

## 2024-11-26 PROCEDURE — 90471 IMMUNIZATION ADMIN: CPT | Performed by: FAMILY MEDICINE

## 2024-11-26 PROCEDURE — 90715 TDAP VACCINE 7 YRS/> IM: CPT | Performed by: FAMILY MEDICINE

## 2024-11-26 PROCEDURE — 80048 BASIC METABOLIC PNL TOTAL CA: CPT

## 2024-11-26 RX ORDER — POTASSIUM CHLORIDE 750 MG/1
10 TABLET, EXTENDED RELEASE ORAL 2 TIMES DAILY
COMMUNITY

## 2024-11-26 RX ORDER — METFORMIN HYDROCHLORIDE 500 MG/1
500 TABLET, EXTENDED RELEASE ORAL
COMMUNITY

## 2024-11-26 RX ORDER — ONDANSETRON 4 MG/1
4 TABLET, ORALLY DISINTEGRATING ORAL EVERY 8 HOURS PRN
COMMUNITY
Start: 2024-11-11

## 2024-11-26 NOTE — LETTER
[facility]  Vaccine Consent Form    Patient Name:  Mariusz Yepez  Patient :  1975     Vaccine(s) Ordered    Tdap Vaccine => 6yo IM (BOOSTRIX/ADACEL)        Screening Checklist  The following questions should be completed prior to vaccination. If you answer “yes” to any question, it does not necessarily mean you should not be vaccinated. It just means we may need to clarify or ask more questions. If a question is unclear, please ask your healthcare provider to explain it.    Yes No   Any fever or moderate to severe illness today (mild illness and/or antibiotic treatment are not contraindications)?     Do you have a history of a serious reaction to any previous vaccinations, such as anaphylaxis, encephalopathy within 7 days, Guillain-Brooklyn syndrome within 6 weeks, seizure?     Have you received any live vaccine(s) (e.g MMR, FARIDEH) or any other vaccines in the last month (to ensure duplicate doses aren't given)?     Do you have an anaphylactic allergy to latex (DTaP, DTaP-IPV, Hep A, Hep B, MenB, RV, Td, Tdap), baker’s yeast (Hep B, HPV), polysorbates (RSV, nirsevimab, PCV 20, Rotavirrus, Tdap, Shingrix), or gelatin (FARIDEH, MMR)?     Do you have an anaphylactic allergy to neomycin (Rabies, FARIDEH, MMR, IPV, Hep A), polymyxin B (IPV), or streptomycin (IPV)?      Any cancer, leukemia, AIDS, or other immune system disorder? (FARIDEH, MMR, RV)     Do you have a parent, brother, or sister with an immune system problem (if immune competence of vaccine recipient clinically verified, can proceed)? (MMR, FARIDEH)     Any recent steroid treatments for >2 weeks, chemotherapy, or radiation treatment? (FARIDEH, MMR)     Have you received antibody-containing blood transfusions or IVIG in the past 11 months (recommended interval is dependent on product)? (MMR, FARIDEH)     Have you taken antiviral drugs (acyclovir, famciclovir, valacyclovir for FARIDEH) in the last 24 or 48 hours, respectively?      Are you pregnant or planning to  "become pregnant within 1 month? (FARIDEH, MMR, HPV, IPV, MenB, Abrexvy; For Hep B- refer to Engerix-B; For RSV - Abrysvo is indicated for 32-36 weeks of pregnancy from September to January)     For infants, have you ever been told your child has had intussusception or a medical emergency involving obstruction of the intestine (Rotavirus)? If not for an infant, can skip this question.         *Ordering Physicians/APC should be consulted if \"yes\" is checked by the patient or guardian above.  I have received, read, and understand the Vaccine Information Statement (VIS) for each vaccine ordered.  I have considered my or my child's health status as well as the health status of my close contacts.  I have taken the opportunity to discuss my vaccine questions with my or my child's health care provider.   I have requested that the ordered vaccine(s) be given to me or my child.  I understand the benefits and risks of the vaccines.  I understand that I should remain in the clinic for 15 minutes after receiving the vaccine(s).  _________________________________________________________  Signature of Patient or Parent/Legal Guardian ____________________  Date     "

## 2024-11-26 NOTE — PROGRESS NOTES
Subjective   Mariusz Yepez is a 49 y.o. female and is here for a comprehensive physical exam. The patient reports  seen at  tested for aldosterone.  She had surgery 1 week ago and had left lobe of thyroid removed.  Krysten De Dios at .  . Patient reports last physical date of over 1 year  HPI      Do you take any herbs or supplements that were not prescribed by a doctor? no  Are you taking calcium supplements? no  Are you taking aspirin daily? no  Family history of ovarian cancer? no  Family history of breast cancer? no  FH of endometrial cancer? no  FH of cervical cancer? no  FH of colon cancer?  Paternal 1st cousins.    Cancer Screening  Mammogram up-to-date?  yes  If yes, last exam date: just had  BMD up-to-date? no  If yes, last exam date: na  Colonoscopy up-to-date? yes   If yes, last exam date: , had repeat in last 6 months 2024  Pap up-to-date? yes   If yes, last exam date:  tang barraza     History:  LMP: No LMP recorded. (Menstrual status: Oral contraceptives).  Menopause at na years  Last pap date:   Abnormal pap? no  : 0  Para: 0    Immunization History  Tdap?  today  HPV?  today  Pneumonia? yes  Shingles? not applicable    The following portions of the patient's history were reviewed and updated as appropriate: allergies, current medications, past family history, past medical history, past social history, past surgical history, and problem list.    Past Medical History:   Diagnosis Date    Allergic 2017    Asthma     Allergy induced    Colon polyp 2012    Esophageal reflux     Fibroid ?    Fibroids     Goiter 2024    History of mammography, screening 2017    Dr. Barraza    History of vitamin D deficiency     Hypertension     Obesity     Pap smear for cervical cancer screening     Dr. Hanna     Polycystic ovaries     Polycystic ovary syndrome ?    Potassium deficiency 2024    Screening breast examination     denies    Thyroid nodule      Varicella ?       Family History   Problem Relation Age of Onset    Hypertension Mother     Diabetes Mother     Stroke Paternal Grandfather     Hypertension Paternal Grandfather     Glaucoma Paternal Grandmother     Hypertension Paternal Grandmother     Pancreatic cancer Maternal Grandmother     Hypertension Maternal Grandmother     Glaucoma Maternal Grandfather     Diabetes Maternal Grandfather     Hypertension Maternal Grandfather     Colon cancer Other         Paternal cousins    Hypertension Sister     Breast cancer Neg Hx     Ovarian cancer Neg Hx     Uterine cancer Neg Hx        Past Surgical History:   Procedure Laterality Date    CHOLECYSTECTOMY  11/30/2017    COLONOSCOPY  04/17/2017    ENDOSCOPY  12/30/2013    esophagitis     LAPAROSCOPIC CHOLECYSTECTOMY      THYROIDECTOMY, PARTIAL      UMBILICAL HERNIA REPAIR      WISDOM TOOTH EXTRACTION  ?       Social History     Socioeconomic History    Marital status: Single   Tobacco Use    Smoking status: Never     Passive exposure: Never    Smokeless tobacco: Never   Vaping Use    Vaping status: Never Used   Substance and Sexual Activity    Alcohol use: Yes     Alcohol/week: 2.0 standard drinks of alcohol     Types: 1 Glasses of wine, 1 Cans of beer per week     Comment: Less than 1 per week    Drug use: No    Sexual activity: Not Currently     Partners: Male     Birth control/protection: Abstinence       Review of Systems  Do you have pain that bothers you in your daily life? no  Review of Systems   Constitutional:  Negative for chills, fatigue, fever and unexpected weight change.   HENT:  Positive for trouble swallowing. Negative for congestion, ear pain, nosebleeds, rhinorrhea, sinus pressure, sneezing and sore throat.    Eyes:  Negative for itching and visual disturbance.   Respiratory:  Negative for cough, chest tightness, shortness of breath and wheezing.    Cardiovascular:  Negative for chest pain, palpitations and leg swelling.   Gastrointestinal:  Negative  for abdominal pain, anal bleeding, blood in stool, constipation, diarrhea, nausea and vomiting.   Endocrine: Negative for cold intolerance, heat intolerance, polydipsia and polyuria.   Genitourinary:  Negative for difficulty urinating, frequency, hematuria and urgency.   Musculoskeletal:  Negative for back pain, gait problem and myalgias.   Skin:  Negative for rash and wound.   Neurological:  Negative for dizziness, weakness, numbness and headaches.   Hematological:  Negative for adenopathy. Does not bruise/bleed easily.   Psychiatric/Behavioral:  Negative for agitation, confusion, decreased concentration and suicidal ideas. The patient is not nervous/anxious.        Objective   Physical Exam  Vitals and nursing note reviewed.   Constitutional:       Appearance: She is well-developed.   HENT:      Head: Normocephalic and atraumatic.      Right Ear: Tympanic membrane normal.      Left Ear: Tympanic membrane normal.      Nose: No congestion or rhinorrhea.      Mouth/Throat:      Pharynx: No oropharyngeal exudate or posterior oropharyngeal erythema.   Eyes:      General:         Right eye: No discharge.         Left eye: No discharge.      Pupils: Pupils are equal, round, and reactive to light.   Neck:      Comments: Steri strip midline neck  Cardiovascular:      Rate and Rhythm: Normal rate and regular rhythm.      Heart sounds: Normal heart sounds.   Pulmonary:      Effort: Pulmonary effort is normal.      Breath sounds: Normal breath sounds.   Abdominal:      General: Bowel sounds are normal.      Palpations: Abdomen is soft. There is no mass.      Tenderness: There is no abdominal tenderness.   Musculoskeletal:         General: Normal range of motion.      Right shoulder: No swelling.      Cervical back: Normal range of motion and neck supple.   Skin:     General: Skin is warm and dry.      Nails: There is no clubbing.   Neurological:      Mental Status: She is alert and oriented to person, place, and time.       Deep Tendon Reflexes: Reflexes are normal and symmetric.   Psychiatric:         Behavior: Behavior normal.         Thought Content: Thought content normal.         Judgment: Judgment normal.          Assessment & Plan   Healthy female exam.      1.   Problem List Items Addressed This Visit          Cardiac and Vasculature    Benign essential hypertension (Chronic)       Endocrine and Metabolic    Vitamin D deficiency    PCOS (polycystic ovarian syndrome)    Morbidly obese    Vitamin B 12 deficiency       Health Encounters    Annual physical exam - Primary    Relevant Orders    Lipid Panel     Other Visit Diagnoses       Need for HPV vaccination        Relevant Orders    HPV Vaccine (HPV9)    Need for diphtheria-tetanus-pertussis (Tdap) vaccine        Relevant Orders    Tdap Vaccine => 8yo IM (BOOSTRIX/ADACEL)    Hypokalemia        Relevant Orders    Basic Metabolic Panel              2. Patient Counseling:  --Nutrition: Stressed importance of moderation in sodium/caffeine intake, saturated fat and cholesterol, caloric balance, sufficient intake of fresh fruits, vegetables, fiber, calcium, iron, and 1 mg of folate supplement per day (for females capable of pregnancy).  --Discussed the issue of estrogen replacement, calcium supplement, and the daily use of baby aspirin.  --Exercise: Stressed the importance of regular exercise.   --Substance Abuse: Discussed cessation/primary prevention of tobacco, alcohol, or other drug use; driving or other dangerous activities under the influence; availability of treatment for abuse.    --Sexuality: Discussed sexually transmitted diseases, partner selection, use of condoms, avoidance of unintended pregnancy  and contraceptive alternatives.   --Injury prevention: Discussed safety belts, safety helmets, smoke detector, smoking near bedding or upholstery.   --Dental health: Discussed importance of regular tooth brushing, flossing, and dental visits.  --Immunizations  reviewed.  --Discussed benefits of screening colonoscopy.  --After hours service discussed with patient    3. Discussed the patient's BMI with her.  The BMI is above average; BMI management plan is completed  4. Follow up in 6 months

## 2024-12-05 DIAGNOSIS — E87.6 HYPOKALEMIA: ICD-10-CM

## 2024-12-05 RX ORDER — POTASSIUM CHLORIDE 750 MG/1
10 TABLET, EXTENDED RELEASE ORAL 2 TIMES DAILY
Qty: 60 TABLET | Refills: 2 | Status: SHIPPED | OUTPATIENT
Start: 2024-12-05

## 2025-01-03 DIAGNOSIS — J30.9 ALLERGIC RHINITIS: ICD-10-CM

## 2025-01-03 DIAGNOSIS — Z76.0 MEDICATION REFILL: ICD-10-CM

## 2025-01-03 RX ORDER — MONTELUKAST SODIUM 10 MG/1
10 TABLET ORAL
Qty: 90 TABLET | Refills: 0 | Status: SHIPPED | OUTPATIENT
Start: 2025-01-03

## 2025-01-03 RX ORDER — LEVOCETIRIZINE DIHYDROCHLORIDE 5 MG/1
5 TABLET, FILM COATED ORAL EVERY EVENING
Qty: 90 TABLET | Refills: 3 | Status: SHIPPED | OUTPATIENT
Start: 2025-01-03

## 2025-01-09 RX ORDER — NORETHINDRONE ACETATE AND ETHINYL ESTRADIOL, ETHINYL ESTRADIOL AND FERROUS FUMARATE 1MG-10(24)
1 KIT ORAL DAILY
Qty: 84 TABLET | Refills: 3 | Status: SHIPPED | OUTPATIENT
Start: 2025-01-09

## 2025-01-09 NOTE — TELEPHONE ENCOUNTER
LOV 11/2024 with LOS. Continuing medication. Refill was not sent in at annual. Will refill for 1 year, patient is up to date.

## 2025-01-21 DIAGNOSIS — I10 BENIGN ESSENTIAL HYPERTENSION: ICD-10-CM

## 2025-01-21 DIAGNOSIS — Z76.0 MEDICATION REFILL: ICD-10-CM

## 2025-01-22 RX ORDER — HYDROCHLOROTHIAZIDE 12.5 MG/1
12.5 TABLET ORAL DAILY
Qty: 90 TABLET | Refills: 0 | Status: SHIPPED | OUTPATIENT
Start: 2025-01-22

## 2025-03-02 DIAGNOSIS — E87.6 HYPOKALEMIA: ICD-10-CM

## 2025-03-03 RX ORDER — POTASSIUM CHLORIDE 750 MG/1
10 TABLET, EXTENDED RELEASE ORAL 2 TIMES DAILY
Qty: 60 TABLET | Refills: 2 | Status: SHIPPED | OUTPATIENT
Start: 2025-03-03

## 2025-03-04 ENCOUNTER — PATIENT ROUNDING (BHMG ONLY) (OUTPATIENT)
Dept: URGENT CARE | Facility: CLINIC | Age: 50
End: 2025-03-04
Payer: COMMERCIAL

## 2025-03-12 ENCOUNTER — TRANSCRIBE ORDERS (OUTPATIENT)
Dept: PHYSICAL THERAPY | Facility: CLINIC | Age: 50
End: 2025-03-12
Payer: COMMERCIAL

## 2025-03-12 DIAGNOSIS — S80.02XA CONTUSION OF LEFT KNEE, INITIAL ENCOUNTER: Primary | ICD-10-CM
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
## 2025-03-14 ENCOUNTER — TREATMENT (OUTPATIENT)
Dept: PHYSICAL THERAPY | Facility: CLINIC | Age: 50
End: 2025-03-14
Payer: OTHER MISCELLANEOUS

## 2025-03-14 DIAGNOSIS — M25.662 DECREASED RANGE OF MOTION (ROM) OF LEFT KNEE: ICD-10-CM

## 2025-03-14 DIAGNOSIS — M25.562 ACUTE PAIN OF LEFT KNEE: Primary | ICD-10-CM

## 2025-03-14 DIAGNOSIS — R29.898 DECREASED STRENGTH INVOLVING KNEE JOINT: ICD-10-CM

## 2025-03-14 PROCEDURE — 97110 THERAPEUTIC EXERCISES: CPT | Performed by: PHYSICAL THERAPIST

## 2025-03-14 PROCEDURE — 97162 PT EVAL MOD COMPLEX 30 MIN: CPT | Performed by: PHYSICAL THERAPIST

## 2025-03-14 PROCEDURE — 97014 ELECTRIC STIMULATION THERAPY: CPT | Performed by: PHYSICAL THERAPIST

## 2025-03-14 PROCEDURE — 97112 NEUROMUSCULAR REEDUCATION: CPT | Performed by: PHYSICAL THERAPIST
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
## 2025-03-17 ENCOUNTER — TREATMENT (OUTPATIENT)
Dept: PHYSICAL THERAPY | Facility: CLINIC | Age: 50
End: 2025-03-17
Payer: OTHER MISCELLANEOUS

## 2025-03-17 DIAGNOSIS — M25.562 ACUTE PAIN OF LEFT KNEE: Primary | ICD-10-CM

## 2025-03-17 DIAGNOSIS — R29.898 DECREASED STRENGTH INVOLVING KNEE JOINT: ICD-10-CM

## 2025-03-17 DIAGNOSIS — M25.662 DECREASED RANGE OF MOTION (ROM) OF LEFT KNEE: ICD-10-CM

## 2025-03-17 PROCEDURE — 97110 THERAPEUTIC EXERCISES: CPT | Performed by: PHYSICAL THERAPIST

## 2025-03-17 PROCEDURE — 97112 NEUROMUSCULAR REEDUCATION: CPT | Performed by: PHYSICAL THERAPIST

## 2025-03-17 NOTE — PROGRESS NOTES
Physical Therapy Daily Treatment Note  [address]   [place] [address]  [phone]      Patient: Mariusz Yepez   : 1975  Referring practitioner: DAVIN Rosado  Date of Initial Visit: Type: THERAPY  Noted: 3/14/2025  Today's Date: 3/17/2025  Patient seen for 2 sessions       Visit Diagnoses:    ICD-10-CM ICD-9-CM   1. Acute pain of left knee  M25.562 719.46   2. Decreased range of motion (ROM) of left knee  M25.662 719.56   3. Decreased strength involving knee joint  R29.898 729.89       Subjective   Mariusz Yepez reports: purchased a TENS unit which seems to be helpful in reducing pn. C/o stinging pn lateral joint line, like a nail is being driven into her knee.    Pre tx pn score: 2-3 anterior lateral joint line  Post tx pn score: 1-2    Objective   See Exercise, Manual, and Modality Logs for complete treatment.     Marked TTP anterior aspect lateral joint line L knee, hoffa fat pad    No visible edema      Assessment & Plan       Assessment  Assessment details: Compliant w/ initial HEP and tolerating ok. C/o localized lateral joint line pn L knee and pn on either side of patellar tendon. Suprapatellar pn much improved. Pn increases w/ active knee extn>flxn. (+) hoffa fat pad impingement sign but most symptoms seem to originate from lateral joint line. Did seem to have improved quad control, tolerance to knee mobility today. Discussed adding isometric knee flxn sitting EOB to HEP, incorporating tibial ER to heel slides at it improved knee pn w/ extn.    Plan  Plan details: Cont to progress per POC-may attempt standing TKE to see if extn tolerance improved in CKC          Timed:         Manual Therapy:    0     mins  84762;     Therapeutic Exercise:    25     mins  52258;     Neuromuscular Issac:    25    mins  09535;    Therapeutic Activity:     0     mins  85291;     Gait Trainin     mins  33535;     Ultrasound:     0     mins  56780;    Ionto                                0    mins   08927  Self Care                       0     mins   35378  Work Conditioning 1-2 hours    0    mins 42866  Work Conditioning ea add'l hour    0   mins 96607      Un-Timed:  Electrical Stimulation:    15     mins  89233 ( );  Dry Needling     0     mins self-pay  Traction     0     mins 99919  Canalith Repos    0     mins 94406    Timed Treatment:   50   mins   Total Treatment:     65   mins    Shelia Neumann, PT  [place] License: [id number]

## 2025-03-19 ENCOUNTER — TRANSCRIBE ORDERS (OUTPATIENT)
Dept: ADMINISTRATIVE | Facility: HOSPITAL | Age: 50
End: 2025-03-19
Payer: COMMERCIAL

## 2025-03-19 DIAGNOSIS — M25.562 LEFT KNEE PAIN, UNSPECIFIED CHRONICITY: Primary | ICD-10-CM

## 2025-03-20 ENCOUNTER — TELEPHONE (OUTPATIENT)
Dept: PHYSICAL THERAPY | Facility: CLINIC | Age: 50
End: 2025-03-20

## 2025-03-20 ENCOUNTER — PATIENT ROUNDING (BHMG ONLY) (OUTPATIENT)
Dept: URGENT CARE | Facility: CLINIC | Age: 50
End: 2025-03-20
Payer: COMMERCIAL

## 2025-03-26 ENCOUNTER — TREATMENT (OUTPATIENT)
Dept: PHYSICAL THERAPY | Facility: CLINIC | Age: 50
End: 2025-03-26
Payer: OTHER MISCELLANEOUS

## 2025-03-26 DIAGNOSIS — M25.662 DECREASED RANGE OF MOTION (ROM) OF LEFT KNEE: ICD-10-CM

## 2025-03-26 DIAGNOSIS — M25.562 ACUTE PAIN OF LEFT KNEE: Primary | ICD-10-CM

## 2025-03-26 DIAGNOSIS — R29.898 DECREASED STRENGTH INVOLVING KNEE JOINT: ICD-10-CM

## 2025-03-26 PROCEDURE — 97112 NEUROMUSCULAR REEDUCATION: CPT | Performed by: PHYSICAL THERAPIST

## 2025-03-26 PROCEDURE — 97530 THERAPEUTIC ACTIVITIES: CPT | Performed by: PHYSICAL THERAPIST

## 2025-03-26 PROCEDURE — 97110 THERAPEUTIC EXERCISES: CPT | Performed by: PHYSICAL THERAPIST

## 2025-03-26 NOTE — PROGRESS NOTES
Physical Therapy Daily Note  [address] [place] [address]      Patient: Mariusz Yepez   : 1975  Diagnosis/ICD-10 Code:  Acute pain of left knee [M25.562]  Referring practitioner: DAVIN Rosado  Date of Initial Visit: Type: THERAPY  Noted: 3/14/2025  Today's Date: 3/26/2025  Patient seen for 3 sessions           Subjective   Mariusz Yepez reports: feeling a bit better over all. She received MRI results and was referred to orthopedics. Continues c/o pain behind the knee cap.       Objective   See Exercise, Manual, and Modality Logs for complete treatment.       Assessment/Plan  Was able to tolerate more exercise today with min/mod pain and slight burning. Did not tolerate seated flexion on EOB. Experienced dizziness and nausea towards end of session which subsided upon standing. Heel raises caused discomfort. PT applied KT tape for patellar alignment which decreased pain and allowed for better performance of exercise and gait.        Progress per Plan of Care and Progress strengthening /stabilization /functional activity      Manual Therapy:         mins  43905;  Therapeutic Exercise:    23     mins  73391;     Neuromuscular Issac:    11    mins  63086;    Therapeutic Activity:     10     mins  98563;     Gait Training:           mins  09810;     Ultrasound:          mins  48585     Electrical Stimulation:         mins  94577 ( );  Dry Needling          mins self-pay    Timed Treatment:   44   mins   Total Treatment:     44   mins      Kelly Gutierres PTA Student    Shelia Neumann, PT  [place] license [id number]    I was present in the PT Department guiding the student by approving, concurring, and confirming the skilled judgement for all services rendered.

## 2025-03-28 ENCOUNTER — TREATMENT (OUTPATIENT)
Dept: PHYSICAL THERAPY | Facility: CLINIC | Age: 50
End: 2025-03-28
Payer: OTHER MISCELLANEOUS

## 2025-03-28 DIAGNOSIS — M25.562 ACUTE PAIN OF LEFT KNEE: Primary | ICD-10-CM

## 2025-03-28 DIAGNOSIS — M25.662 DECREASED RANGE OF MOTION (ROM) OF LEFT KNEE: ICD-10-CM

## 2025-03-28 DIAGNOSIS — R29.898 DECREASED STRENGTH INVOLVING KNEE JOINT: ICD-10-CM

## 2025-03-28 PROCEDURE — 97140 MANUAL THERAPY 1/> REGIONS: CPT | Performed by: PHYSICAL THERAPIST

## 2025-03-28 PROCEDURE — 97110 THERAPEUTIC EXERCISES: CPT | Performed by: PHYSICAL THERAPIST

## 2025-03-28 PROCEDURE — 97112 NEUROMUSCULAR REEDUCATION: CPT | Performed by: PHYSICAL THERAPIST

## 2025-03-28 RX ORDER — METFORMIN HYDROCHLORIDE 500 MG/1
500 TABLET, EXTENDED RELEASE ORAL
Qty: 90 TABLET | Refills: 0 | Status: SHIPPED | OUTPATIENT
Start: 2025-03-28

## 2025-03-28 RX ORDER — METFORMIN HYDROCHLORIDE 500 MG/1
500 TABLET, EXTENDED RELEASE ORAL
Qty: 30 TABLET | Refills: 1 | Status: SHIPPED | OUTPATIENT
Start: 2025-03-28 | End: 2025-03-28

## 2025-03-28 NOTE — PROGRESS NOTES
Physical Therapy Daily Treatment Note  [address]  [address]   [place] [address]  [phone]      Patient: Mariusz Yepez   : 1975  Referring practitioner: DAVIN Rosado  Date of Initial Visit: Type: THERAPY  Noted: 3/14/2025  Today's Date: 3/28/2025  Patient seen for 4 sessions       Visit Diagnoses:    ICD-10-CM ICD-9-CM   1. Acute pain of left knee  M25.562 719.46   2. Decreased range of motion (ROM) of left knee  M25.662 719.56   3. Decreased strength involving knee joint  R29.898 729.89       Subjective   Mariusz Yepez reports: elevator out of order at school this morning, had to use stairs. Was painful and had to take slowly. Does feel that the tape made walking much more comfortable. Overall feels better.    Pre tx pn score: 3  Post tx pn score: 3    Objective   See Exercise, Manual, and Modality Logs for complete treatment.       Assessment & Plan       Assessment  Assessment details: Good response to KT. Able to tolerate [facility] hip/knee strengthening very well today. Most TTP along inferior patellar pole, lateral patella, and lateral aspect of infrapatellar fat pad. Able to achieve full knee extn easily, flxn still moderately limited by pn. Instructed in self KT techniques. No inc in pn by end of visit.    Plan  Plan details: Cont w/ KT, may attempt STS w/ focus on mechanics for post chain activation          Timed:         Manual Therapy:    8     mins  12805;     Therapeutic Exercise:    15     mins  97166;     Neuromuscular Issac:    30    mins  86100;    Therapeutic Activity:     0     mins  89716;     Gait Trainin     mins  31276;     Ultrasound:     0     mins  46884;    Ionto                               0    mins   47768  Self Care                       0     mins   30552  Work Conditioning 1-2 hours    0    mins 49186  Work Conditioning ea add'l hour    0   mins 23489      Un-Timed:  Electrical Stimulation:    0     mins  86894 ( );  Dry  Needling     0     mins self-pay  Traction     0     mins 56955  Canalith Repos    0     mins 35792    Timed Treatment:   53   mins   Total Treatment:     53   mins    Shelia Neumann, PT  [place] License: [id number]

## 2025-03-31 ENCOUNTER — TREATMENT (OUTPATIENT)
Dept: PHYSICAL THERAPY | Facility: CLINIC | Age: 50
End: 2025-03-31
Payer: OTHER MISCELLANEOUS

## 2025-03-31 DIAGNOSIS — M25.662 DECREASED RANGE OF MOTION (ROM) OF LEFT KNEE: ICD-10-CM

## 2025-03-31 DIAGNOSIS — R29.898 DECREASED STRENGTH INVOLVING KNEE JOINT: ICD-10-CM

## 2025-03-31 DIAGNOSIS — M25.562 ACUTE PAIN OF LEFT KNEE: Primary | ICD-10-CM

## 2025-03-31 PROCEDURE — 97110 THERAPEUTIC EXERCISES: CPT | Performed by: PHYSICAL THERAPIST

## 2025-03-31 PROCEDURE — 97530 THERAPEUTIC ACTIVITIES: CPT | Performed by: PHYSICAL THERAPIST

## 2025-03-31 PROCEDURE — 97112 NEUROMUSCULAR REEDUCATION: CPT | Performed by: PHYSICAL THERAPIST

## 2025-03-31 NOTE — PROGRESS NOTES
Physical Therapy Daily Note  [address] [place] [address]      Patient: Mariusz Yepez   : 1975  Diagnosis/ICD-10 Code:  Acute pain of left knee [M25.562]  Referring practitioner: DAVIN Rosado  Date of Initial Visit: Type: THERAPY  Noted: 3/14/2025  Today's Date: 3/31/2025  Patient seen for 5 sessions           Subjective   Mariusz Yepez reports: not having much pain in the L knee this morning. She had increased pain in the knee yesterday which limited her from navigating the store. She did go later in the afternoon. She still struggles with walking long distances and taking stairs. Walking down ramp at school is painful. The hardest thing for her to do is getting up from seated position. Pain increases throughout the day. She is going out of the country next week.      Objective   See Exercise, Manual, and Modality Logs for complete treatment.       Assessment/Plan  Worked on strengthening with eccentric motions; DL RDL and sit to stands with airex. Required cues for shifting weight to L with heel raises. Tolerated much more exercise today as endurance is increasing. May keep exercises the same next time to not exacerbate symptoms before her trip. Ended with ice for any soreness in the left knee with progressions. No dizziness noted today.        Progress per Plan of Care and Progress strengthening /stabilization /functional activity      Manual Therapy:         mins  36564;  Therapeutic Exercise:    25     mins  60316;     Neuromuscular Issac:   13    mins  69713;    Therapeutic Activity:     10     mins  28104;     Gait Training:           mins  50193;     Ultrasound:          mins  76342     Electrical Stimulation:         mins  87400 ( );  Dry Needling          mins self-pay    Timed Treatment:   48   mins   Total Treatment:     56   mins      Kelly Gutierres PTA Student    I was present in the PT Department guiding the student by approving, concurring, and  confirming the skilled judgement for all services rendered.     Shelia Ugalde, PTA  Physical Therapist Assistant

## 2025-04-01 DIAGNOSIS — I10 BENIGN ESSENTIAL HYPERTENSION: ICD-10-CM

## 2025-04-01 RX ORDER — NEBIVOLOL 10 MG/1
10 TABLET ORAL DAILY
Qty: 90 TABLET | Refills: 3 | Status: SHIPPED | OUTPATIENT
Start: 2025-04-01

## 2025-04-02 ENCOUNTER — TREATMENT (OUTPATIENT)
Dept: PHYSICAL THERAPY | Facility: CLINIC | Age: 50
End: 2025-04-02
Payer: OTHER MISCELLANEOUS

## 2025-04-02 DIAGNOSIS — M25.562 ACUTE PAIN OF LEFT KNEE: Primary | ICD-10-CM

## 2025-04-02 DIAGNOSIS — M25.662 DECREASED RANGE OF MOTION (ROM) OF LEFT KNEE: ICD-10-CM

## 2025-04-02 DIAGNOSIS — R29.898 DECREASED STRENGTH INVOLVING KNEE JOINT: ICD-10-CM

## 2025-04-02 PROCEDURE — 97530 THERAPEUTIC ACTIVITIES: CPT | Performed by: PHYSICAL THERAPIST

## 2025-04-02 PROCEDURE — 97112 NEUROMUSCULAR REEDUCATION: CPT | Performed by: PHYSICAL THERAPIST

## 2025-04-02 PROCEDURE — 97110 THERAPEUTIC EXERCISES: CPT | Performed by: PHYSICAL THERAPIST

## 2025-04-02 NOTE — PROGRESS NOTES
Physical Therapy Daily Note  [address] [place] [address]      Patient: Mariusz Yepez   : 1975  Diagnosis/ICD-10 Code:  Acute pain of left knee [M25.562]  Referring practitioner: DAVIN Rosado  Date of Initial Visit: Type: THERAPY  Noted: 3/14/2025  Today's Date: 2025  Patient seen for 6 sessions           Subjective   Mariusz Yepez reports: was not too sore after last visit. Pain remains in the front of the L knee. Her L knee is painful and very tired by the end of the day. She reports neurological issues have flared up and cause more difficulty walking. She has been out shopping often this week.      L knee pain: 2/10      Objective   See Exercise, Manual, and Modality Logs for complete treatment.       Assessment/Plan  PTA advised pt to take many breaks while walking around on vacation. She was recommended that a walking stick may take some pressure off of the knee. L Knee was KT taped in clinic because of loss of elasticity after getting wet in the shower. Did not progress exercises as we did not want to exacerbate knee pain before trip. Pain occurred with rest breaks while standing in one place. IFC and ice were administered after treatment to decrease pain and inflammation.     Progress per Plan of Care and Progress strengthening /stabilization /functional activity      Manual Therapy:         mins  06021;  Therapeutic Exercise:    30     mins  94140;     Neuromuscular Issac:    11    mins  92296;    Therapeutic Activity:     15     mins  50904;     Gait Training:           mins  71473;     Ultrasound:          mins  10830     Electrical Stimulation:    20     mins  25354 ( );  Dry Needling          mins self-pay    Timed Treatment:   56   mins   Total Treatment:     76   mins      Kelly Gutierres PTA Student    I was present in the PT Department guiding the student by approving, concurring, and confirming the skilled judgement for all services  rendered.     Shelia Ugalde, PTA  Physical Therapist Assistant

## 2025-04-03 DIAGNOSIS — J30.9 ALLERGIC RHINITIS: ICD-10-CM

## 2025-04-03 RX ORDER — MONTELUKAST SODIUM 10 MG/1
10 TABLET ORAL
Qty: 90 TABLET | Refills: 0 | Status: SHIPPED | OUTPATIENT
Start: 2025-04-03 | End: 2025-04-07 | Stop reason: SDUPTHER

## 2025-04-04 ENCOUNTER — TREATMENT (OUTPATIENT)
Dept: PHYSICAL THERAPY | Facility: CLINIC | Age: 50
End: 2025-04-04
Payer: OTHER MISCELLANEOUS

## 2025-04-04 DIAGNOSIS — M25.662 DECREASED RANGE OF MOTION (ROM) OF LEFT KNEE: ICD-10-CM

## 2025-04-04 DIAGNOSIS — R29.898 DECREASED STRENGTH INVOLVING KNEE JOINT: ICD-10-CM

## 2025-04-04 DIAGNOSIS — M25.562 ACUTE PAIN OF LEFT KNEE: Primary | ICD-10-CM

## 2025-04-04 DIAGNOSIS — J30.9 ALLERGIC RHINITIS: ICD-10-CM

## 2025-04-04 NOTE — PROGRESS NOTES
"                          Physical Therapy Daily Treatment Note                  [address] [facility] [address]  [place] [address]      Patient: Mariusz Yepez   : 1975  Diagnosis/ICD-10 Code:  Acute pain of left knee [M25.562]  Referring practitioner: DAVIN Rosado  Date of Initial Visit: Type: THERAPY  Noted: 3/14/2025  Today's Date: 2025  Patient seen for 7 sessions             Subjective   Mariusz Yepez reports: L knee has just been more irritated since starting to be more active this week. She was feeling really good on Monday so she has been out walking more and shopping but the left knee has been increasing in pain with more activity. She had wanted to increase activity to get ready for her big trip and just all the things she wants to get back to with school demands. Ice and e-stim help calm it down with rest but still feel burning sensation at rest. The sharp pain is more random.    L knee pain:  4/10 constant pre treatment  0-1/10 post treatment    Objective          Tenderness   Left Knee   Tenderness in the inferior fat pad and patellar tendon.       See Exercise, Manual, and Modality Logs for complete treatment.       Assessment/Plan  KT has not been helpful this week. Trial of Renteria Pinch tape at the left patellar tendon with significant change in burning symptoms, especially standing/walking. Demonstrated and educated on different taping method. No more than 24 hours with tape. Take breaks as needed for the skin to decrease breakdown. Completed ultrasound to the inferior left patella that \"felt good\". Did not complete normal exercise routine as she leave on a plane tomorrow.   Progress per Plan of Care and Progress strengthening /stabilization /functional activity           Timed:  Manual Therapy:    8     mins  49218;  Therapeutic Exercise:         mins  01332;     Neuromuscular Issac:        mins  62634;    Therapeutic Activity:     15     mins  11539;   "   Gait Training:           mins  04410;     Ultrasound:     10     mins  71265;    Electrical Stimulation:         mins  40105;  Iontophoresis          mins  40551;  Work Conditioning 1-2 hr ___ mins 96940;  Work Conditioning ea additional hr ___ mins 62614    Untimed:  Electrical Stimulation:         mins  32868 ( );  Mechanical Traction:         mins  73910;   Fluidotherapy          mins  91859    Timed Treatment:   33   mins   Total Treatment:     41   mins        Shelia Ugalde PTA  Physical Therapist Assistant

## 2025-04-05 ENCOUNTER — PATIENT ROUNDING (BHMG ONLY) (OUTPATIENT)
Dept: URGENT CARE | Facility: CLINIC | Age: 50
End: 2025-04-05
Payer: COMMERCIAL

## 2025-04-07 ENCOUNTER — TELEPHONE (OUTPATIENT)
Dept: FAMILY MEDICINE CLINIC | Facility: CLINIC | Age: 50
End: 2025-04-07
Payer: COMMERCIAL

## 2025-04-07 RX ORDER — MONTELUKAST SODIUM 10 MG/1
10 TABLET ORAL
Qty: 90 TABLET | Refills: 0 | Status: SHIPPED | OUTPATIENT
Start: 2025-04-07

## 2025-04-21 ENCOUNTER — TREATMENT (OUTPATIENT)
Dept: PHYSICAL THERAPY | Facility: CLINIC | Age: 50
End: 2025-04-21
Payer: OTHER MISCELLANEOUS

## 2025-04-21 DIAGNOSIS — M25.662 DECREASED RANGE OF MOTION (ROM) OF LEFT KNEE: ICD-10-CM

## 2025-04-21 DIAGNOSIS — R29.898 DECREASED STRENGTH INVOLVING KNEE JOINT: ICD-10-CM

## 2025-04-21 DIAGNOSIS — M25.562 ACUTE PAIN OF LEFT KNEE: Primary | ICD-10-CM

## 2025-04-21 PROCEDURE — 97014 ELECTRIC STIMULATION THERAPY: CPT | Performed by: PHYSICAL THERAPIST

## 2025-04-21 PROCEDURE — 97110 THERAPEUTIC EXERCISES: CPT | Performed by: PHYSICAL THERAPIST

## 2025-04-21 PROCEDURE — 97530 THERAPEUTIC ACTIVITIES: CPT | Performed by: PHYSICAL THERAPIST
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
## 2025-04-22 ENCOUNTER — PATIENT ROUNDING (BHMG ONLY) (OUTPATIENT)
Dept: URGENT CARE | Facility: CLINIC | Age: 50
End: 2025-04-22
Payer: COMMERCIAL

## 2025-04-22 ENCOUNTER — OFFICE VISIT (OUTPATIENT)
Dept: FAMILY MEDICINE CLINIC | Facility: CLINIC | Age: 50
End: 2025-04-22
Payer: COMMERCIAL

## 2025-04-22 ENCOUNTER — OFFICE VISIT (OUTPATIENT)
Dept: ORTHOPEDIC SURGERY | Facility: CLINIC | Age: 50
End: 2025-04-22
Payer: OTHER MISCELLANEOUS

## 2025-04-22 VITALS
OXYGEN SATURATION: 99 % | TEMPERATURE: 98.6 F | WEIGHT: 152 LBS | HEART RATE: 73 BPM | DIASTOLIC BLOOD PRESSURE: 60 MMHG | RESPIRATION RATE: 20 BRPM | HEIGHT: 61 IN | BODY MASS INDEX: 28.7 KG/M2 | SYSTOLIC BLOOD PRESSURE: 100 MMHG

## 2025-04-22 VITALS
BODY MASS INDEX: 28.89 KG/M2 | HEIGHT: 61 IN | WEIGHT: 153 LBS | SYSTOLIC BLOOD PRESSURE: 124 MMHG | DIASTOLIC BLOOD PRESSURE: 80 MMHG
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
## 2025-04-22 DIAGNOSIS — S83.422A SPRAIN OF LATERAL COLLATERAL LIGAMENT OF LEFT KNEE, INITIAL ENCOUNTER: Primary | ICD-10-CM

## 2025-04-22 DIAGNOSIS — J45.20 MILD INTERMITTENT ASTHMA WITHOUT COMPLICATION: Primary | ICD-10-CM

## 2025-04-22 PROCEDURE — 99213 OFFICE O/P EST LOW 20 MIN: CPT | Performed by: FAMILY MEDICINE

## 2025-04-22 PROCEDURE — 99214 OFFICE O/P EST MOD 30 MIN: CPT | Performed by: ORTHOPAEDIC SURGERY

## 2025-04-22 NOTE — PROGRESS NOTES
Subjective     Chief Complaint  Cough, Nasal Congestion, and Fatigue (Test yesterday and was dx with unspecific sinus infection)    Subjective          Mariusz Yepez is a 49 y.o. female who presents today to [facility] for follow up.    HPI:   Cough    Fatigue  Symptoms include cough and fatigue.        History of Present Illness  The patient is a 49-year-old female who presents for evaluation of a cough.    A cough developed during her stay in [place] last Tuesday, initially mild but became more pronounced upon her return home. The following day, severe fatigue was experienced, necessitating bed rest for 5 days. Despite the absence of fever, an increase in coughing frequency and the need for inhaler use is reported. Symptoms have been managed with her inhaler and medications for her knee. COVID-19, influenza, and strep tests were conducted yesterday, all of which returned negative results. A known history of asthma is noted, and a Z-Cheng along with a 5-day course of prednisone was prescribed at an urgent care facility, of which 2 doses have been taken. Flovent and Xopenex is used as an inhaler. Wheezing is not typically exhibited, but a persistent cough occasionally produces an unusual sound. Her mother advised inhaler use, which seemed to help. Inhaler use was resumed today. A rise in basal temperature is noticed, attributed to the steroids. Difficulty eating due to decreased appetite and fatigue while on prednisone is reported.      The following portions of the patient's history were reviewed and updated as appropriate: allergies, current medications, past family history, past medical history, past social history, past surgical history and problem list.    Objective     Objective     Allergy:   Allergies   Allergen Reactions   • Lopressor [Metoprolol Tartrate]      Fatigue     • Norvasc [Amlodipine Besylate]      Constipation    • Phenergan [Promethazine Hcl]  Shortness Of Breath   • Promethazine Shortness Of Breath, Nausea Only and Swelling   • Codeine Nausea And Vomiting   • Carbamazepine Rash        Current Medications:   Current Outpatient Medications   Medication Sig Dispense Refill   • acetaminophen (TYLENOL) 500 MG tablet Take 2 tablets by mouth Every 6 (Six) Hours As Needed (knee pain). 60 tablet 0   • azelastine (ASTELIN) 0.1 % nasal spray INSTILL 2 SPRAYS IN EACH NOSTRIL AS DIRECTED BY PROVIDER TWICE DAILY 30 mL 11   • azithromycin (ZITHROMAX) 250 MG tablet Take 2 tablets by mouth Daily for 1 day, THEN 1 tablet Daily for 4 days. 6 tablet 0   • Cholecalciferol (VITAMIN D3) 2000 UNITS tablet Take  by mouth.     • diclofenac (VOLTAREN) 50 MG EC tablet Take 1 tablet by mouth 3 (Three) Times a Day. 90 tablet 2   • Diclofenac Sodium (VOLTAREN) 1 % gel gel Apply  topically to the appropriate area as directed 2 (Two) Times a Day. 350 g 5   • fluticasone (FLONASE) 50 MCG/ACT nasal spray 2 sprays into the nostril(s) as directed by provider Daily. 48 g 11   • fluticasone (FLOVENT HFA) 110 MCG/ACT inhaler INHALE 2 PUFFS BY MOUTH TWICE DAILY AS DIRECTED. RINSE MOUTH AFTER USE     • hydroCHLOROthiazide 12.5 MG tablet TAKE 1 TABLET BY MOUTH DAILY 90 tablet 0   • ketoconazole (NIZORAL) 2 % shampoo      • lamoTRIgine (LaMICtal) 25 MG tablet Take 1 tablet by mouth 2 (Two) Times a Day. (Patient taking differently: Take 1 tablet by mouth Daily. Patient takes 1 tablet daily) 60 tablet 3   • levalbuterol (XOPENEX HFA) 45 MCG/ACT inhaler INHALE 2 PUFFS BY MOUTH EVERY 4 TO 6 HOURS AS NEEDED FOR COUGH OR SHORTNESS OF BREATH OR WHEEZING     • levocetirizine (XYZAL) 5 MG tablet TAKE 1 TABLET BY MOUTH EVERY EVENING 90 tablet 3   • Lo Loestrin Fe 1 MG-10 MCG / 10 MCG tablet TAKE 1 TABLET BY MOUTH DAILY 84 tablet 3   • metFORMIN ER (GLUCOPHAGE-XR) 500 MG 24 hr tablet TAKE 1 TABLET BY MOUTH DAILY WITH BREAKFAST 90 tablet 0   • montelukast (SINGULAIR) 10 MG tablet TAKE 1 TABLET BY MOUTH EVERY  NIGHT AT BEDTIME 90 tablet 0   • nebivolol (BYSTOLIC) 10 MG tablet TAKE 1 TABLET BY MOUTH DAILY 90 tablet 3   • ondansetron ODT (ZOFRAN-ODT) 4 MG disintegrating tablet Place 1 tablet on the tongue Every 8 (Eight) Hours As Needed.     • potassium chloride (KLOR-CON M10) 10 MEQ CR tablet TAKE 1 TABLET BY MOUTH TWICE DAILY 60 tablet 2   • predniSONE (DELTASONE) 20 MG tablet Take 2 tablets by mouth Daily for 5 days. 10 tablet 0   • Scopolamine 1 MG/3DAYS patch Place 1 patch on the skin as directed by provider Every 72 (Seventy-Two) Hours. 10 each 1   • Voquezna 20 MG tablet Take 1 tablet by mouth Daily.     • Zepbound 5 MG/0.5ML solution auto-injector ADMINISTER 5 MG UNDER THE SKIN 1 TIME WEEKLY       No current facility-administered medications for this visit.       Past Medical History:  Past Medical History:   Diagnosis Date   • Allergic August 2017   • Asthma     Allergy induced   • Colon polyp July 2012   • Esophageal reflux    • Fibroid ?   • Fibroids    • Goiter 07/2024   • History of mammography, screening 09/06/2017    Dr. Barraza   • History of vitamin D deficiency    • Hypertension    • Obesity    • Pap smear for cervical cancer screening 2016    Dr. Hanna    • Polycystic ovaries    • Polycystic ovary syndrome ?   • Potassium deficiency 07/2024   • Screening breast examination     denies   • Thyroid nodule    • Varicella ?       Past Surgical History:  Past Surgical History:   Procedure Laterality Date   • CHOLECYSTECTOMY  11/30/2017   • COLONOSCOPY  04/17/2017   • ENDOSCOPY  12/30/2013    esophagitis    • LAPAROSCOPIC CHOLECYSTECTOMY     • THYROIDECTOMY, PARTIAL     • UMBILICAL HERNIA REPAIR     • WISDOM TOOTH EXTRACTION  ?       Social History:  Social History     Socioeconomic History   • Marital status: Single   Tobacco Use   • Smoking status: Never     Passive exposure: Never   • Smokeless tobacco: Never   Vaping Use   • Vaping status: Never Used   Substance and Sexual Activity   • Alcohol use: Yes      "Alcohol/week: 2.0 standard drinks of alcohol     Types: 1 Glasses of wine, 1 Cans of beer per week     Comment: Less than 1 per week   • Drug use: Never   • Sexual activity: Not Currently     Partners: Male     Birth control/protection: Abstinence       Family History:  Family History   Problem Relation Age of Onset   • Hypertension Mother    • Diabetes Mother    • Stroke Paternal Grandfather    • Hypertension Paternal Grandfather    • Glaucoma Paternal Grandmother    • Hypertension Paternal Grandmother    • Pancreatic cancer Maternal Grandmother    • Hypertension Maternal Grandmother    • Glaucoma Maternal Grandfather    • Diabetes Maternal Grandfather    • Hypertension Maternal Grandfather    • Colon cancer Other         Paternal cousins   • Hypertension Sister    • Breast cancer Neg Hx    • Ovarian cancer Neg Hx    • Uterine cancer Neg Hx            Vital Signs:   /60 (BP Location: Right arm, Patient Position: Sitting, Cuff Size: Adult)   Pulse 73   Temp 98.6 °F (37 °C) (Temporal)   Resp 20   Ht 154.9 cm (60.98\")   Wt 68.9 kg (152 lb)   SpO2 99%   BMI 28.74 kg/m²      Physical Exam:  Physical Exam  Vitals reviewed.   Constitutional:       Appearance: She is not ill-appearing.   HENT:      Right Ear: Tympanic membrane normal.      Left Ear: Tympanic membrane normal.      Nose: No congestion or rhinorrhea.      Mouth/Throat:      Pharynx: No posterior oropharyngeal erythema.   Eyes:      Pupils: Pupils are equal, round, and reactive to light.   Cardiovascular:      Rate and Rhythm: Normal rate.      Pulses: Normal pulses.   Pulmonary:      Effort: Pulmonary effort is normal.      Breath sounds: Normal breath sounds.   Neurological:      General: No focal deficit present.      Mental Status: She is alert. Mental status is at baseline.   Psychiatric:         Mood and Affect: Mood normal.         Judgment: Judgment normal.               PHQ-9 Score  PHQ-9 Total Score:      Lab Review  Admission on " 04/21/2025, Discharged on 04/21/2025   Component Date Value Ref Range Status   • COVID19 04/21/2025 Not Detected   Final   • Influenza A Antigen BRITTANY 04/21/2025 Not Detected   Final   • Influenza B Antigen BRITTANY 04/21/2025 Not Detected   Final   • Internal Control 04/21/2025 Passed   Final   • Lot Number 04/21/2025 [id number]   Final   • Expiration Date 04/21/2025 2/7/26   Final   • Rapid Strep A Screen 04/21/2025 Negative   Final   • Internal Control 04/21/2025 Passed   Final   • Lot Number 04/21/2025 [id number]   Final   • Expiration Date 04/21/2025 5/9/29   Final   Admission on 03/19/2025, Discharged on 03/19/2025   Component Date Value Ref Range Status   • Rapid Strep A Screen 03/19/2025 Positive (A)   Corrected   • Internal Control 03/19/2025 Passed   Final-Edited   • Lot Number 03/19/2025 [id number]   Final-Edited   • Expiration Date 03/19/2025 3-   Final-Edited   • COVID19 03/19/2025 Not Detected   Final   • Influenza A Antigen BRITTANY 03/19/2025 Not Detected   Final   • Influenza B Antigen BRITTANY 03/19/2025 Not Detected   Final   • Internal Control 03/19/2025 Passed   Final   • Lot Number 03/19/2025 [id number]   Final   • Expiration Date 03/19/2025 3/5/2026   Final        Radiology Results        Assessment / Plan         Assessment and Plan   Diagnoses and all orders for this visit:    1. Mild intermittent asthma without complication (Primary)      Assessment & Plan  1. Asthma exacerbation.  - Symptoms include increased coughing and decreased appetite, consistent with asthma exacerbation rather than a sinus infection.  - Physical exam findings show clear lungs and no signs of pneumonia; throat and nose examination revealed slight drainage.  - Discussion included the impact of climate change and recent travel on asthma, and the role of prednisone in causing basal temperature rise.  - Current treatment includes prednisone, Zithromax, albuterol, and Flovent inhalers with a spacer. Mucinex DM is recommended to help  with sputum expulsion. Follow-up is advised if symptoms persist after completing the medication course.      Discussed possible differential diagnoses, testing, treatment, recommended non-pharmacological interventions, risks, warning signs to monitor for that would indicate need for follow-up in clinic or ER. If no improvement with these regimens or you have new or worsening symptoms follow-up. Patient verbalizes understanding and agreement with plan of care. Denies further needs or concerns.     Patient was given instructions and counseling regarding her condition and for health maintenance advised.         Health Maintenance  Health Maintenance: There are no preventive care reminders to display for this patient.     Meds ordered during this visit  No orders of the defined types were placed in this encounter.      Meds stopped during this visit:  There are no discontinued medications.     Visit Diagnoses    ICD-10-CM ICD-9-CM   1. Mild intermittent asthma without complication  J45.20 493.90       Patient was given instructions and counseling regarding her condition or for health maintenance advice. Please see specific information pulled into the AVS if appropriate.     Follow Up   No follow-ups on file.      Patient or patient representative verbalized consent for the use of Ambient Listening during the visit with  Laura Phelps DO for chart documentation. 4/22/2025  16:17 EDT      This document has been electronically signed by Laura Phelps DO   April 22, 2025 16:23 EDT    Dictated Utilizing Dragon Dictation: Part of this note may be an electronic transcription/translation of spoken language to printed text using the Dragon Dictation System.    Laura Phelps D.O.  [facility]

## 2025-04-22 NOTE — PROGRESS NOTES
[facility] Note        Subjective     Pain and Initial Evaluation of the Left Knee      HPI    Mariusz Yepez is a 49 y.o. female.  Patient is here today seeing me for a work-related left knee injury.  Last saw me in May 2022 for bilateral knee pain.  She says that on 3/3/2025, she was at work and was kicked by a 6-year-old twice on the medial side of the knee.  She immediately had anterolateral knee pain.  Has been to physical therapy.  She traveled and went out of the country and came back with a respiratory infection so she has been off work for the last several days.  She has a brace that keeps her from bending the knee which she says makes her feel better.  She is here for further evaluation and treatment.        Past Medical History:   Diagnosis Date   • Allergic August 2017   • Asthma     Allergy induced   • Colon polyp July 2012   • Esophageal reflux    • Fibroid ?   • Fibroids    • Goiter 07/2024   • History of mammography, screening 09/06/2017    Dr. Barraza   • History of vitamin D deficiency    • Hypertension    • Obesity    • Pap smear for cervical cancer screening 2016    Dr. Hanna    • Polycystic ovaries    • Polycystic ovary syndrome ?   • Potassium deficiency 07/2024   • Screening breast examination     denies   • Thyroid nodule    • Varicella ?      Past Surgical History:   Procedure Laterality Date   • CHOLECYSTECTOMY  11/30/2017   • COLONOSCOPY  04/17/2017   • ENDOSCOPY  12/30/2013    esophagitis    • LAPAROSCOPIC CHOLECYSTECTOMY     • THYROIDECTOMY, PARTIAL     • UMBILICAL HERNIA REPAIR     • WISDOM TOOTH EXTRACTION  ?      Family History   Problem Relation Age of Onset   • Hypertension Mother    • Diabetes Mother    • Stroke Paternal Grandfather    • Hypertension Paternal Grandfather    • Glaucoma Paternal Grandmother    • Hypertension Paternal Grandmother    • Pancreatic cancer Maternal Grandmother    • Hypertension Maternal Grandmother    • Glaucoma  Maternal Grandfather    • Diabetes Maternal Grandfather    • Hypertension Maternal Grandfather    • Colon cancer Other         Paternal cousins   • Hypertension Sister    • Breast cancer Neg Hx    • Ovarian cancer Neg Hx    • Uterine cancer Neg Hx      Social History     Socioeconomic History   • Marital status: Single   Tobacco Use   • Smoking status: Never     Passive exposure: Never   • Smokeless tobacco: Never   Vaping Use   • Vaping status: Never Used   Substance and Sexual Activity   • Alcohol use: Yes     Alcohol/week: 2.0 standard drinks of alcohol     Types: 1 Glasses of wine, 1 Cans of beer per week     Comment: Less than 1 per week   • Drug use: Never   • Sexual activity: Not Currently     Partners: Male     Birth control/protection: Abstinence      Current Outpatient Medications on File Prior to Visit   Medication Sig Dispense Refill   • acetaminophen (TYLENOL) 500 MG tablet Take 2 tablets by mouth Every 6 (Six) Hours As Needed (knee pain). 60 tablet 0   • azelastine (ASTELIN) 0.1 % nasal spray INSTILL 2 SPRAYS IN EACH NOSTRIL AS DIRECTED BY PROVIDER TWICE DAILY 30 mL 11   • azithromycin (ZITHROMAX) 250 MG tablet Take 2 tablets by mouth Daily for 1 day, THEN 1 tablet Daily for 4 days. 6 tablet 0   • Cholecalciferol (VITAMIN D3) 2000 UNITS tablet Take  by mouth.     • diclofenac (VOLTAREN) 50 MG EC tablet Take 1 tablet by mouth 3 (Three) Times a Day. 90 tablet 2   • Diclofenac Sodium (VOLTAREN) 1 % gel gel Apply  topically to the appropriate area as directed 2 (Two) Times a Day. 350 g 5   • fluticasone (FLONASE) 50 MCG/ACT nasal spray 2 sprays into the nostril(s) as directed by provider Daily. 48 g 11   • fluticasone (FLOVENT HFA) 110 MCG/ACT inhaler INHALE 2 PUFFS BY MOUTH TWICE DAILY AS DIRECTED. RINSE MOUTH AFTER USE     • hydroCHLOROthiazide 12.5 MG tablet TAKE 1 TABLET BY MOUTH DAILY 90 tablet 0   • ketoconazole (NIZORAL) 2 % shampoo      • lamoTRIgine (LaMICtal) 25 MG tablet Take 1 tablet by mouth 2  (Two) Times a Day. (Patient taking differently: Take 1 tablet by mouth Daily. Patient takes 1 tablet daily) 60 tablet 3   • levalbuterol (XOPENEX HFA) 45 MCG/ACT inhaler INHALE 2 PUFFS BY MOUTH EVERY 4 TO 6 HOURS AS NEEDED FOR COUGH OR SHORTNESS OF BREATH OR WHEEZING     • levocetirizine (XYZAL) 5 MG tablet TAKE 1 TABLET BY MOUTH EVERY EVENING 90 tablet 3   • Lo Loestrin Fe 1 MG-10 MCG / 10 MCG tablet TAKE 1 TABLET BY MOUTH DAILY 84 tablet 3   • metFORMIN ER (GLUCOPHAGE-XR) 500 MG 24 hr tablet TAKE 1 TABLET BY MOUTH DAILY WITH BREAKFAST 90 tablet 0   • montelukast (SINGULAIR) 10 MG tablet TAKE 1 TABLET BY MOUTH EVERY NIGHT AT BEDTIME 90 tablet 0   • nebivolol (BYSTOLIC) 10 MG tablet TAKE 1 TABLET BY MOUTH DAILY 90 tablet 3   • ondansetron ODT (ZOFRAN-ODT) 4 MG disintegrating tablet Place 1 tablet on the tongue Every 8 (Eight) Hours As Needed.     • potassium chloride (KLOR-CON M10) 10 MEQ CR tablet TAKE 1 TABLET BY MOUTH TWICE DAILY 60 tablet 2   • predniSONE (DELTASONE) 20 MG tablet Take 2 tablets by mouth Daily for 5 days. 10 tablet 0   • Scopolamine 1 MG/3DAYS patch Place 1 patch on the skin as directed by provider Every 72 (Seventy-Two) Hours. 10 each 1   • Voquezna 20 MG tablet Take 1 tablet by mouth Daily.     • Zepbound 5 MG/0.5ML solution auto-injector ADMINISTER 5 MG UNDER THE SKIN 1 TIME WEEKLY       No current facility-administered medications on file prior to visit.      Allergies   Allergen Reactions   • Lopressor [Metoprolol Tartrate]      Fatigue     • Norvasc [Amlodipine Besylate]      Constipation    • Phenergan [Promethazine Hcl] Shortness Of Breath   • Promethazine Shortness Of Breath, Nausea Only and Swelling   • Codeine Nausea And Vomiting   • Carbamazepine Rash          Review of Systems   Constitutional: Negative.    HENT: Negative.     Eyes: Negative.    Respiratory: Negative.     Cardiovascular: Negative.    Gastrointestinal: Negative.    Endocrine: Negative.    Genitourinary: Negative.   "  Musculoskeletal:  Positive for arthralgias.   Skin: Negative.    Allergic/Immunologic: Negative.    Neurological: Negative.    Hematological: Negative.    Psychiatric/Behavioral: Negative.          I reviewed the patient's chief complaint, history of present illness, review of systems, past medical history, surgical history, family history, social history, medications and allergy list.        Objective      Physical Exam  /80   Ht 154.9 cm (61\")   Wt 69.4 kg (153 lb)   BMI 28.91 kg/m²     Body mass index is 28.91 kg/m².    General  Mental Status - alert  General Appearance - cooperative, well groomed, not in acute distress  Orientation - Oriented X3  Build & Nutrition - well developed and well nourished  Posture - normal posture  Gait - normal gait       Ortho Exam      Musculoskeletal:  Global Assessment:  Overall assessment of Lower Extremity Muscle Strength and Tone:  Left quadriceps--5/5   Left hamstrings--5/5       Left tibialis anterior--5/5  Left gastroc-soleus--5/5  Left EHL--5/5      Inspection and Palpation:    Left knee:  Tenderness:  Over anterior lateral, severe  Effusion:  1+  Crepitus:  none  Pulses:  2+  Ecchymosis:  None  Warmth:  None       ROM:  Left:  Extension:0     Flexion:130    Instability:    Left:    Lachman Test:  Negative  Varus stress test negative  Valgus stress test negative   Anterior Drawer Test:  Negative  Posterior Drawer Test:  Negative    Functional Testing:    Left:  Zackery's test:  Positive  Patella grind test:  Negative  Q-angle:  Normal  Apprehension Sign:  Negative      Imaging/Studies Reviewed and Interpreted:  Imaging Results (Last 24 Hours)       ** No results found for the last 24 hours. **          We reviewed images and report of an x-ray of the patient's left knee from 3/3/2025 and an MRI of the patient's left knee from [facility] dated 3/24/2025.  Radiographs are negative for acute bony abnormality.  MRI is negative for meniscal, articular cartilage, and " ligamentous injury.    Assessment    Assessment:  1. Sprain of lateral collateral ligament of left knee, initial encounter    2. Work related injury        Plan:  Continue over-the-counter medication as needed for discomfort  Work-related left LCL sprain--makes the most sense regarding the mechanism of injury with a medial sided blow.  I want to get her moving and take her out of her knee immobilizer.  She needs to go into a [employer] hinged knee sleeve.  We will continue physical therapy and she needs to work on strengthening.  Do not anticipate surgery being necessary in this situation and we have discussed this with her.  I will check her back in 6 to 8 weeks to see how she is doing overall.  We will go ahead and reorder physical therapy.  We will get her back to work with a note saying she may sit as needed.        Christo García MD  04/22/25  13:43 EDT      Dictated Utilizing Dragon Dictation.

## 2025-04-24 ENCOUNTER — TREATMENT (OUTPATIENT)
Dept: PHYSICAL THERAPY | Facility: CLINIC | Age: 50
End: 2025-04-24
Payer: OTHER MISCELLANEOUS

## 2025-04-24 DIAGNOSIS — M25.562 ACUTE PAIN OF LEFT KNEE: Primary | ICD-10-CM

## 2025-04-24 DIAGNOSIS — M25.662 DECREASED RANGE OF MOTION (ROM) OF LEFT KNEE: ICD-10-CM

## 2025-04-24 DIAGNOSIS — R29.898 DECREASED STRENGTH INVOLVING KNEE JOINT: ICD-10-CM

## 2025-04-24 PROCEDURE — 97035 APP MDLTY 1+ULTRASOUND EA 15: CPT | Performed by: PHYSICAL THERAPIST

## 2025-04-24 PROCEDURE — 97112 NEUROMUSCULAR REEDUCATION: CPT | Performed by: PHYSICAL THERAPIST

## 2025-04-24 PROCEDURE — 97110 THERAPEUTIC EXERCISES: CPT | Performed by: PHYSICAL THERAPIST

## 2025-04-24 NOTE — PROGRESS NOTES
Physical Therapy Daily Treatment Note  [address]  [address]   [place] [address]  [phone]      Patient: Mariusz Yepez   : 1975  Referring practitioner: DAVIN Rosado  Date of Initial Visit: Type: THERAPY  Noted: 3/14/2025  Today's Date: 2025  Patient seen for 9 sessions       Visit Diagnoses:    ICD-10-CM ICD-9-CM   1. Acute pain of left knee  M25.562 719.46   2. Decreased range of motion (ROM) of left knee  M25.662 719.56   3. Decreased strength involving knee joint  R29.898 729.89       Subjective   Mariusz Yepez reports: Saw ortho, given a hinged knee brace to wear in place of immobilizer. Encouraged to take seated rest breaks throughout the day at work.    Pre tx pn score: 2  Post tx pn score: 3    Objective   See Exercise, Manual, and Modality Logs for complete treatment.       Assessment & Plan       Assessment  Assessment details: Has completed Ortho consult, diagnosed w/ likely LCL sprain. Recommended she cont PT for strengthening/ROM, given hinged knee brace. Focused visit primarily on NWB exercise targeting hip/knee strengthening and low level knee mobility. Tolerates extn activity very well, while flxn cont to be limited by pn.     Plan  Plan details: Cont per current POC          Timed:         Manual Therapy:    0     mins  88590;     Therapeutic Exercise:    25     mins  91230;     Neuromuscular Issac:    15    mins  43138;    Therapeutic Activity:     0     mins  74712;     Gait Trainin     mins  03268;     Ultrasound:     8     mins  56573;    Ionto                               0    mins   56956  Self Care                       0     mins   50015  Work Conditioning 1-2 hours    0    mins 44591  Work Conditioning ea add'l hour    0   mins 06459      Un-Timed:  Electrical Stimulation:    0     mins  63916 (MC );  Dry Needling     0     mins self-pay  Traction     0     mins 88650  Canalith Repos    0     mins 85333    Timed  Treatment:   48   mins   Total Treatment:     48   mins    Shelia Neumann, PT  [place] License: [id number]

## 2025-04-28 ENCOUNTER — TREATMENT (OUTPATIENT)
Dept: PHYSICAL THERAPY | Facility: CLINIC | Age: 50
End: 2025-04-28
Payer: OTHER MISCELLANEOUS

## 2025-04-28 DIAGNOSIS — R29.898 DECREASED STRENGTH INVOLVING KNEE JOINT: ICD-10-CM

## 2025-04-28 DIAGNOSIS — M25.562 ACUTE PAIN OF LEFT KNEE: Primary | ICD-10-CM

## 2025-04-28 DIAGNOSIS — M25.662 DECREASED RANGE OF MOTION (ROM) OF LEFT KNEE: ICD-10-CM

## 2025-04-28 PROCEDURE — 97035 APP MDLTY 1+ULTRASOUND EA 15: CPT | Performed by: PHYSICAL THERAPIST

## 2025-04-28 PROCEDURE — 97112 NEUROMUSCULAR REEDUCATION: CPT | Performed by: PHYSICAL THERAPIST

## 2025-04-28 PROCEDURE — 97110 THERAPEUTIC EXERCISES: CPT | Performed by: PHYSICAL THERAPIST

## 2025-04-28 NOTE — PROGRESS NOTES
"Physical Therapy Daily Treatment Note  [address] [facility]  [address]   [place] [address]  [phone]      Patient: Mariusz Yepez   : 1975  Referring practitioner: DAVIN Rosado  Date of Initial Visit: Type: THERAPY  Noted: 3/14/2025  Today's Date: 2025  Patient seen for 10 sessions       Visit Diagnoses:    ICD-10-CM ICD-9-CM   1. Acute pain of left knee  M25.562 719.46   2. Decreased range of motion (ROM) of left knee  M25.662 719.56   3. Decreased strength involving knee joint  R29.898 729.89       Subjective   Mariusz Yepez reports: has started to feel a sharp pn across the front of her knee when she is walking. Describes as \"razor blades\". Feels weak and unstable. Had quite a bit of pn after her last session, contributes to knee bends performed at end of visit.    Pre tx pn score: 3  Post tx pn score: 3    Objective   See Exercise, Manual, and Modality Logs for complete treatment.     L knee flxn AROM 0-110 deg    Assessment & Plan       Assessment  Assessment details: Deferred knee flxn exercises given report of exacerbated symptoms following her last f/u. However does exhibit improved excursion into flxn today. Chief complaint remains pn along the anterolateral joint line w/ prolonged walking/stairs and knee flxn. Able to initiate TG squats for strengthening w/ good tolerance. Pn no further increased at end of visit today.    Plan  Plan details: Cont per POC          Timed:         Manual Therapy:    0     mins  23757;     Therapeutic Exercise:    30     mins  83029;     Neuromuscular Issac:    12    mins  98157;    Therapeutic Activity:     0     mins  07607;     Gait Trainin     mins  86014;     Ultrasound:     8     mins  79953;    Ionto                               0    mins   82275  Self Care                       0     mins   07360  Work Conditioning 1-2 hours    0    mins 18543  Work Conditioning ea add'l hour    0   mins " 38844      Un-Timed:  Electrical Stimulation:    0     mins  47265 ( );  Dry Needling     0     mins self-pay  Traction     0     mins 83080  Canalith Repos    0     mins 06822    Timed Treatment:   50   mins   Total Treatment:     50   mins    Shelia Neumann, PT  [place] License: [id number]

## 2025-05-01 ENCOUNTER — TREATMENT (OUTPATIENT)
Dept: PHYSICAL THERAPY | Facility: CLINIC | Age: 50
End: 2025-05-01
Payer: OTHER MISCELLANEOUS

## 2025-05-01 DIAGNOSIS — M25.562 ACUTE PAIN OF LEFT KNEE: Primary | ICD-10-CM

## 2025-05-01 DIAGNOSIS — R29.898 DECREASED STRENGTH INVOLVING KNEE JOINT: ICD-10-CM

## 2025-05-01 DIAGNOSIS — M25.662 DECREASED RANGE OF MOTION (ROM) OF LEFT KNEE: ICD-10-CM

## 2025-05-01 PROCEDURE — 97112 NEUROMUSCULAR REEDUCATION: CPT | Performed by: PHYSICAL THERAPIST

## 2025-05-01 PROCEDURE — 97110 THERAPEUTIC EXERCISES: CPT | Performed by: PHYSICAL THERAPIST

## 2025-05-01 PROCEDURE — 97530 THERAPEUTIC ACTIVITIES: CPT | Performed by: PHYSICAL THERAPIST

## 2025-05-01 NOTE — PROGRESS NOTES
Physical Therapy Daily Treatment Note  [address]  [address]   [place] [address]  [phone]      Patient: Mariusz Yepez   : 1975  Referring practitioner: DAVIN Rosado  Date of Initial Visit: Type: THERAPY  Noted: 3/14/2025  Today's Date: 2025  Patient seen for 11 sessions       Visit Diagnoses:    ICD-10-CM ICD-9-CM   1. Acute pain of left knee  M25.562 719.46   2. Decreased range of motion (ROM) of left knee  M25.662 719.56   3. Decreased strength involving knee joint  R29.898 729.89       Subjective   Mariusz Yepez reports: Doing about the same. Some days feels really good, others can easily set off her pn. Still feels that the brace is helpful. Also tapes her knee occasionally at home and seems to help as well, though short term.    Pre tx pn score: 3  Post tx pn score: 3    Objective   See Exercise, Manual, and Modality Logs for complete treatment.       Assessment & Plan       Assessment  Assessment details: Min change from last session. Cont to report fairly localized pn along the anterolateral joint line that worsens w/ knee flxn >90 deg and prolonged standing/walking/stairs. Min TTP over LCL. Attempted to inc standing activity today but noted inc pn quickly w/ standing hip abd when performed bilaterally, primarily when WBing on LLE. May need to dec band resistance next visit.    Plan  Plan details: Cont to work toward inc standing activity as pn allows          Timed:         Manual Therapy:    0     mins  07161;     Therapeutic Exercise:    25     mins  39410;     Neuromuscular Issac:    12    mins  83302;    Therapeutic Activity:     10     mins  19032;     Gait Trainin     mins  95693;     Ultrasound:     0     mins  39450;    Ionto                               0    mins   61013  Self Care                       0     mins   89335  Work Conditioning 1-2 hours    0    mins 16250  Work Conditioning ea add'l hour    0   mins  45905      Un-Timed:  Electrical Stimulation:    0     mins  26122 ( );  Dry Needling     0     mins self-pay  Traction     0     mins 38636  Canalith Repos    0     mins 80832    Timed Treatment:   47   mins   Total Treatment:     47   mins    Shelia Neumann, PT  [place] License: [id number]

## 2025-05-05 ENCOUNTER — TREATMENT (OUTPATIENT)
Dept: PHYSICAL THERAPY | Facility: CLINIC | Age: 50
End: 2025-05-05
Payer: OTHER MISCELLANEOUS

## 2025-05-05 DIAGNOSIS — M25.662 DECREASED RANGE OF MOTION (ROM) OF LEFT KNEE: ICD-10-CM

## 2025-05-05 DIAGNOSIS — R29.898 DECREASED STRENGTH INVOLVING KNEE JOINT: ICD-10-CM

## 2025-05-05 DIAGNOSIS — M25.562 ACUTE PAIN OF LEFT KNEE: Primary | ICD-10-CM

## 2025-05-05 PROCEDURE — 97530 THERAPEUTIC ACTIVITIES: CPT | Performed by: PHYSICAL THERAPIST

## 2025-05-05 PROCEDURE — 97110 THERAPEUTIC EXERCISES: CPT | Performed by: PHYSICAL THERAPIST

## 2025-05-05 PROCEDURE — 97112 NEUROMUSCULAR REEDUCATION: CPT | Performed by: PHYSICAL THERAPIST

## 2025-05-05 NOTE — PROGRESS NOTES
Physical Therapy Daily Treatment Note                  [address] [facility] [address]  [place] [address]      Patient: Mariusz Yepez   : 1975  Diagnosis/ICD-10 Code:  Acute pain of left knee [M25.562]  Referring practitioner: DAVIN Rosado  Date of Initial Visit: Type: THERAPY  Noted: 3/14/2025  Today's Date: 2025  Patient seen for 12 sessions             Subjective   Mariusz Yepez reports: seems more irritated today. Increase in pain Friday after the week of school. Seems like she did walk a good amount around school but still takes sitting breaks when pain increases. Pain medication and elevating lying down help it the most when irritated.     L knee pain-  10 pre treatment  5/10 post treatment    Objective   L knee 0-110 deg AROM  See Exercise, Manual, and Modality Logs for complete treatment.       Assessment/Plan  Able to complete all exercises just in smaller amounts compared to last visit. C/o burning pain in the front of the left knee. AROM has improved since last progress note. Still dealing with anterior left knee pain that worsens with a lot of activity.  Will continue to work toward increasing standing activity as pain allows. No manual/modalities complete as pt declined due to no change in pain with this intervention.   Progress per Plan of Care and Progress strengthening /stabilization /functional activity           Timed:  Manual Therapy:         mins  04943;  Therapeutic Exercise:    24     mins  63484;     Neuromuscular Issac:   13    mins  54135;    Therapeutic Activity:     8     mins  52194;     Gait Training:           mins  87915;     Ultrasound:          mins  84468;    Electrical Stimulation:         mins  26437;  Iontophoresis          mins  81626;  Work Conditioning 1-2 hr ___ mins 27689;  Work Conditioning ea additional hr ___ mins 72136    Untimed:  Electrical Stimulation:         mins  73017 ( );  Mechanical  Traction:         mins  53432;   Fluidotherapy          mins  13094    Timed Treatment:   45   mins   Total Treatment:     45   mins        Shelia Ugalde PTA  Physical Therapist Assistant

## 2025-05-12 DIAGNOSIS — Z76.0 MEDICATION REFILL: ICD-10-CM

## 2025-05-12 DIAGNOSIS — I10 BENIGN ESSENTIAL HYPERTENSION: ICD-10-CM

## 2025-05-12 RX ORDER — HYDROCHLOROTHIAZIDE 12.5 MG/1
12.5 TABLET ORAL DAILY
Qty: 90 TABLET | Refills: 0 | Status: SHIPPED | OUTPATIENT
Start: 2025-05-12

## 2025-06-03 ENCOUNTER — OFFICE VISIT (OUTPATIENT)
Dept: ORTHOPEDIC SURGERY | Facility: CLINIC | Age: 50
End: 2025-06-03
Payer: OTHER MISCELLANEOUS

## 2025-06-03 VITALS
BODY MASS INDEX: 28.7 KG/M2 | WEIGHT: 152 LBS | DIASTOLIC BLOOD PRESSURE: 60 MMHG | HEIGHT: 61 IN | SYSTOLIC BLOOD PRESSURE: 110 MMHG
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
## 2025-06-03 DIAGNOSIS — S83.422D SPRAIN OF LATERAL COLLATERAL LIGAMENT OF LEFT KNEE, SUBSEQUENT ENCOUNTER: Primary | ICD-10-CM

## 2025-06-03 PROCEDURE — 99213 OFFICE O/P EST LOW 20 MIN: CPT | Performed by: ORTHOPAEDIC SURGERY

## 2025-06-03 NOTE — PROGRESS NOTES
"    [facility]        Subjective     CC: Follow-up (6 week follow up--Sprain of lateral collateral ligament of left knee)      HPI    Mariusz Yepez is a 49 y.o. female.  Patient is here today for follow-up of her work-related left knee injury.  Had to switch physical therapy locations to [facility] in [place] where she has been before.  She continues to be on restrictions at work consisting of being able to sit down as needed.  Headed to [place] here in the middle of June.  Still having difficulty with stairs and some lateral sided knee pain from time to time.    Overall, patient's symptoms are as above    ROS:    Constiutional:Pt denies fever, chills, nausea, or vomiting.  MSK:as above        Objective      Past Medical History  Past Medical History:   Diagnosis Date   • Allergic August 2017   • Asthma     Allergy induced   • Colon polyp July 2012   • Esophageal reflux    • Fibroid ?   • Fibroids    • Goiter 07/2024   • History of mammography, screening 09/06/2017    Dr. Barraza   • History of vitamin D deficiency    • Hypertension    • Obesity    • Pap smear for cervical cancer screening 2016    Dr. Hanna    • Polycystic ovaries    • Polycystic ovary syndrome ?   • Potassium deficiency 07/2024   • Screening breast examination     denies   • Thyroid nodule    • Varicella ?     Social History     Socioeconomic History   • Marital status: Single   Tobacco Use   • Smoking status: Never     Passive exposure: Never   • Smokeless tobacco: Never   Vaping Use   • Vaping status: Never Used   Substance and Sexual Activity   • Alcohol use: Yes     Alcohol/week: 2.0 standard drinks of alcohol     Types: 1 Glasses of wine, 1 Cans of beer per week     Comment: Less than 1 per week   • Drug use: Never   • Sexual activity: Not Currently     Partners: Male     Birth control/protection: Abstinence          Physical Exam  /60   Ht 154.9 cm (60.98\")   Wt 68.9 kg (152 lb)   BMI 28.74 kg/m² "     Body mass index is 28.74 kg/m².    Patient is well nourished and well developed.        Ortho Exam  Patient continues to be tender at the anterior lateral joint line.  The knee grossly is stable to varus and valgus force at 0 and 30 degrees.  Negative anterior and posterior drawer.  No knee effusion.  Full knee range of motion.  5 out of 5 quad and hamstring strength.    Imaging/Labs/EMG Reviewed and Interpreted:  Imaging Results (Last 24 Hours)       ** No results found for the last 24 hours. **              Assessment    Assessment:  1. Sprain of lateral collateral ligament of left knee, subsequent encounter    2. Work related injury        Plan:  Recommend over the counter anti-inflammatories for pain and/or swelling  Work-related left LCL sprain--patient continues to improve.  Needs to continue physical therapy.  Check her back in 6 weeks and hopefully we can release her to go back to work without restriction.      Christo García MD  06/03/25  08:54 EDT      Dictated Utilizing Dragon Dictation.

## 2025-06-09 ENCOUNTER — LAB (OUTPATIENT)
Dept: LAB | Facility: HOSPITAL | Age: 50
End: 2025-06-09
Payer: COMMERCIAL

## 2025-06-09 ENCOUNTER — OFFICE VISIT (OUTPATIENT)
Dept: FAMILY MEDICINE CLINIC | Facility: CLINIC | Age: 50
End: 2025-06-09
Payer: COMMERCIAL

## 2025-06-09 VITALS
WEIGHT: 153.8 LBS | TEMPERATURE: 98 F | RESPIRATION RATE: 20 BRPM | DIASTOLIC BLOOD PRESSURE: 66 MMHG | SYSTOLIC BLOOD PRESSURE: 100 MMHG | BODY MASS INDEX: 29.08 KG/M2 | OXYGEN SATURATION: 99 % | HEART RATE: 80 BPM

## 2025-06-09 DIAGNOSIS — I10 BENIGN ESSENTIAL HYPERTENSION: Chronic | ICD-10-CM

## 2025-06-09 DIAGNOSIS — I10 BENIGN ESSENTIAL HYPERTENSION: Primary | Chronic | ICD-10-CM

## 2025-06-09 DIAGNOSIS — E53.8 VITAMIN B 12 DEFICIENCY: ICD-10-CM

## 2025-06-09 DIAGNOSIS — G50.0 TRIGEMINAL NEURALGIA: Chronic | ICD-10-CM

## 2025-06-09 DIAGNOSIS — E28.2 PCOS (POLYCYSTIC OVARIAN SYNDROME): ICD-10-CM

## 2025-06-09 DIAGNOSIS — E55.9 VITAMIN D DEFICIENCY: ICD-10-CM

## 2025-06-09 DIAGNOSIS — E87.6 HYPOKALEMIA: ICD-10-CM

## 2025-06-09 DIAGNOSIS — D25.1 LEIOMYOMA, INTRAMURAL: ICD-10-CM

## 2025-06-09 LAB
25(OH)D3 SERPL-MCNC: 97.1 NG/ML (ref 30–100)
ANION GAP SERPL CALCULATED.3IONS-SCNC: 13 MMOL/L (ref 5–15)
BUN SERPL-MCNC: 10 MG/DL (ref 6–20)
BUN/CREAT SERPL: 11.8 (ref 7–25)
CALCIUM SPEC-SCNC: 7 MG/DL (ref 8.6–10.5)
CHLORIDE SERPL-SCNC: 104 MMOL/L (ref 98–107)
CO2 SERPL-SCNC: 21 MMOL/L (ref 22–29)
CREAT SERPL-MCNC: 0.85 MG/DL (ref 0.57–1)
EGFRCR SERPLBLD CKD-EPI 2021: 85.2 ML/MIN/1.73
GLUCOSE SERPL-MCNC: 85 MG/DL (ref 65–99)
POTASSIUM SERPL-SCNC: 3.8 MMOL/L (ref 3.5–5.2)
SODIUM SERPL-SCNC: 138 MMOL/L (ref 136–145)
VIT B12 BLD-MCNC: 322 PG/ML (ref 211–946)

## 2025-06-09 PROCEDURE — 82607 VITAMIN B-12: CPT

## 2025-06-09 PROCEDURE — 99214 OFFICE O/P EST MOD 30 MIN: CPT | Performed by: FAMILY MEDICINE

## 2025-06-09 PROCEDURE — 82306 VITAMIN D 25 HYDROXY: CPT

## 2025-06-09 PROCEDURE — 80048 BASIC METABOLIC PNL TOTAL CA: CPT

## 2025-06-09 RX ORDER — POTASSIUM CHLORIDE 750 MG/1
10 TABLET, EXTENDED RELEASE ORAL 2 TIMES DAILY
Qty: 60 TABLET | Refills: 5 | Status: SHIPPED | OUTPATIENT
Start: 2025-06-09 | End: 2025-06-09

## 2025-06-09 RX ORDER — POTASSIUM CHLORIDE 750 MG/1
10 TABLET, EXTENDED RELEASE ORAL 2 TIMES DAILY
Qty: 60 TABLET | Refills: 5 | Status: SHIPPED | OUTPATIENT
Start: 2025-06-09

## 2025-06-09 NOTE — PROGRESS NOTES
Subjective   Mariusz Yepez is a 49 y.o. female.     History of Present Illness she is here for follow-up of hypertension  sHe is currently on hydrochlorothiazide 12.5, she is also on Bystolic 10 mg daily.  No other new complaints.    She also currently is on metformin 500 mg PCOS    She takes VaQua asthma for reflux.    She has had recent visits to Dr. Harris Ibrahim with [facility].  This was for a work-related knee sprain    She is not taking b12 supp but she is taking vit d.     She is taking Z[facility]bound at [facility].  She has lostweight with wegovy.      She is going to [employer].  She will knee refills on some medications.  She would like her potassium refilled.        The following portions of the patient's history were reviewed and updated as appropriate: allergies, current medications, past family history, past medical history, past social history, past surgical history, and problem list.    Review of Systems   Constitutional:  Negative for chills, diaphoresis, fatigue and fever.   HENT:  Negative for congestion and sore throat.    Respiratory:  Negative for cough.    Cardiovascular:  Negative for chest pain.   Gastrointestinal:  Negative for abdominal pain, nausea and vomiting.   Genitourinary:  Negative for dysuria.   Musculoskeletal:  Negative for myalgias and neck pain.   Skin:  Negative for rash.   Neurological:  Negative for weakness, numbness and headaches.       Objective     Vitals:    06/09/25 0832   BP: 100/66   BP Location: Right arm   Patient Position: Sitting   Cuff Size: Adult   Pulse: 80   Resp: 20   Temp: 98 °F (36.7 °C)   TempSrc: Infrared   SpO2: 99%   Weight: 69.8 kg (153 lb 12.8 oz)       Physical Exam  Vitals and nursing note reviewed.   Constitutional:       General: She is not in acute distress.     Appearance: She is well-developed. She is not diaphoretic.   HENT:      Head: Normocephalic and atraumatic.      Right Ear: External ear normal.      Left Ear: External  ear normal.   Eyes:      General: Lids are normal. No scleral icterus.        Right eye: No discharge.         Left eye: No discharge.      Conjunctiva/sclera: Conjunctivae normal.      Pupils: Pupils are equal, round, and reactive to light.   Neck:      Thyroid: No thyroid mass or thyromegaly.      Vascular: No carotid bruit or JVD.      Trachea: No tracheal deviation.   Cardiovascular:      Rate and Rhythm: Normal rate and regular rhythm.      Heart sounds: Normal heart sounds. No murmur heard.     No friction rub. No gallop.   Pulmonary:      Effort: Pulmonary effort is normal. No respiratory distress.      Breath sounds: Normal breath sounds. No decreased breath sounds, wheezing, rhonchi or rales.   Chest:      Chest wall: No tenderness.   Abdominal:      General: Bowel sounds are normal. There is no distension.      Palpations: Abdomen is soft. There is no mass.      Tenderness: There is no abdominal tenderness. There is no guarding or rebound.      Hernia: No hernia is present.   Musculoskeletal:         General: Normal range of motion.   Lymphadenopathy:      Cervical: No cervical adenopathy.      Upper Body:      Right upper body: No supraclavicular adenopathy.      Left upper body: No supraclavicular adenopathy.   Skin:     Findings: No bruising, erythema or rash.      Nails: There is no clubbing.   Neurological:      Mental Status: She is alert and oriented to person, place, and time.      Cranial Nerves: No cranial nerve deficit.      Deep Tendon Reflexes: Reflexes are normal and symmetric. Reflexes normal.   Psychiatric:         Speech: Speech normal.         Behavior: Behavior normal.         Thought Content: Thought content normal.         Judgment: Judgment normal.         Assessment & Plan     Problem List Items Addressed This Visit          Cardiac and Vasculature    Benign essential hypertension - Primary (Chronic)    Relevant Orders    Basic Metabolic Panel       Endocrine and Metabolic    Vitamin  D deficiency    Relevant Orders    Vitamin D,25-Hydroxy    PCOS (polycystic ovarian syndrome)    Vitamin B 12 deficiency    Relevant Orders    Vitamin B12       Genitourinary and Reproductive     Leiomyoma, intramural       Neuro    Trigeminal neuralgia (Chronic)     Other Visit Diagnoses         Hypokalemia        Relevant Medications    potassium chloride (KLOR-CON M10) 10 MEQ CR tablet

## 2025-06-11 ENCOUNTER — LAB (OUTPATIENT)
Dept: LAB | Facility: HOSPITAL | Age: 50
End: 2025-06-11
Payer: COMMERCIAL

## 2025-06-11 DIAGNOSIS — E83.51 HYPOCALCEMIA: ICD-10-CM

## 2025-06-11 LAB
ANION GAP SERPL CALCULATED.3IONS-SCNC: 12 MMOL/L (ref 5–15)
BUN SERPL-MCNC: 13 MG/DL (ref 6–20)
BUN/CREAT SERPL: 16.5 (ref 7–25)
CALCIUM SPEC-SCNC: 9.2 MG/DL (ref 8.6–10.5)
CALCIUM SPEC-SCNC: 9.2 MG/DL (ref 8.6–10.5)
CHLORIDE SERPL-SCNC: 104 MMOL/L (ref 98–107)
CO2 SERPL-SCNC: 23 MMOL/L (ref 22–29)
CREAT SERPL-MCNC: 0.79 MG/DL (ref 0.57–1)
EGFRCR SERPLBLD CKD-EPI 2021: 93 ML/MIN/1.73
GLUCOSE SERPL-MCNC: 90 MG/DL (ref 65–99)
POTASSIUM SERPL-SCNC: 3.7 MMOL/L (ref 3.5–5.2)
PTH-INTACT SERPL-MCNC: 18.7 PG/ML (ref 15–65)
SODIUM SERPL-SCNC: 139 MMOL/L (ref 136–145)

## 2025-06-11 PROCEDURE — 83970 ASSAY OF PARATHORMONE: CPT

## 2025-06-11 PROCEDURE — 80048 BASIC METABOLIC PNL TOTAL CA: CPT

## 2025-06-11 PROCEDURE — 36415 COLL VENOUS BLD VENIPUNCTURE: CPT

## 2025-06-30 RX ORDER — METFORMIN HYDROCHLORIDE 500 MG/1
500 TABLET, EXTENDED RELEASE ORAL
Qty: 90 TABLET | Refills: 0 | Status: SHIPPED | OUTPATIENT
Start: 2025-06-30

## 2025-07-15 ENCOUNTER — OFFICE VISIT (OUTPATIENT)
Dept: ORTHOPEDIC SURGERY | Facility: CLINIC | Age: 50
End: 2025-07-15
Payer: OTHER MISCELLANEOUS

## 2025-07-15 VITALS
WEIGHT: 153 LBS | HEIGHT: 61 IN | SYSTOLIC BLOOD PRESSURE: 102 MMHG | BODY MASS INDEX: 28.89 KG/M2 | DIASTOLIC BLOOD PRESSURE: 66 MMHG

## 2025-07-15 DIAGNOSIS — S83.422D SPRAIN OF LATERAL COLLATERAL LIGAMENT OF LEFT KNEE, SUBSEQUENT ENCOUNTER: Primary | ICD-10-CM
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
## 2025-07-15 NOTE — PROGRESS NOTES
"    [facility] Note        Subjective     CC: Follow-up (6 week follow up---Sprain of lateral collateral ligament of left knee)      CIRO Yepez is a 49 y.o. female.  Patient is here today for follow-up of her work-related left knee LCL sprain.  She is doing physical therapy.  Was able to go to [place] without too many problems.  Does continue to have pain after prolonged periods of standing and walking.    Overall, patient's symptoms are as above    ROS:    Constiutional:Pt denies fever, chills, nausea, or vomiting.  MSK:as above        Objective      Past Medical History  Past Medical History:   Diagnosis Date    Allergic August 2017    Asthma     Allergy induced    Colon polyp July 2012    Esophageal reflux     Fibroid ?    Fibroids     Goiter 07/2024    History of mammography, screening 09/06/2017    Dr. Barraza    History of vitamin D deficiency     Hypertension     Obesity     Pap smear for cervical cancer screening 2016    Dr. Hanna     Polycystic ovaries     Polycystic ovary syndrome ?    Potassium deficiency 07/2024    Screening breast examination     denies    Thyroid nodule     Varicella ?     Social History     Socioeconomic History    Marital status: Single   Tobacco Use    Smoking status: Never     Passive exposure: Never    Smokeless tobacco: Never   Vaping Use    Vaping status: Never Used   Substance and Sexual Activity    Alcohol use: Yes     Alcohol/week: 2.0 standard drinks of alcohol     Types: 1 Glasses of wine, 1 Cans of beer per week     Comment: Less than 1 per week    Drug use: Never    Sexual activity: Not Currently     Partners: Male     Birth control/protection: Abstinence          Physical Exam  /66   Ht 154.9 cm (60.98\")   Wt 69.4 kg (153 lb)   BMI 28.92 kg/m²     Body mass index is 28.92 kg/m².    Patient is well nourished and well developed.        Ortho Exam    Musculoskeletal:    Inspection and Palpation:    Left knee:  Tenderness:  " None at joint lines  Effusion:  minimal  Crepitus:  none  Pulses:  2+  Ecchymosis:  None  Warmth:  None       ROM:  Left:  Extension: 0     flexion: 125    Instability:    Left:  Lachman Test:  Negative, Varus stress test negative, Valgus stress test negative   Anterior Drawer Test:  Negative, Posterior Drawer Test:  Negative    Functional Testing:    Left:  Zackery's test: Negative  Patella grind test:  Negative  Q-angle:  Normal  Apprehension Sign:  Negative      Imaging/Labs/EMG Reviewed and Interpreted:  Imaging Results (Last 24 Hours)       ** No results found for the last 24 hours. **              Assessment    Assessment:  1. Sprain of lateral collateral ligament of left knee, subsequent encounter    2. Work related injury        Plan:  Recommend over the counter anti-inflammatories for pain and/or swelling  Work-related left LCL sprain--patient continues to improve.  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  I will put her at [facility] today.  Follow-up as needed.       Christo García MD  07/15/25  09:01 EDT      Dictated Utilizing Dragon Dictation.